# Patient Record
Sex: FEMALE | Race: WHITE | NOT HISPANIC OR LATINO | Employment: OTHER | URBAN - METROPOLITAN AREA
[De-identification: names, ages, dates, MRNs, and addresses within clinical notes are randomized per-mention and may not be internally consistent; named-entity substitution may affect disease eponyms.]

---

## 2017-02-06 ENCOUNTER — OFFICE VISIT (OUTPATIENT)
Dept: ONCOLOGY | Facility: CLINIC | Age: 42
End: 2017-02-06

## 2017-02-06 VITALS
DIASTOLIC BLOOD PRESSURE: 62 MMHG | HEIGHT: 65 IN | WEIGHT: 144.6 LBS | HEART RATE: 98 BPM | TEMPERATURE: 98.1 F | BODY MASS INDEX: 24.09 KG/M2 | RESPIRATION RATE: 16 BRPM | SYSTOLIC BLOOD PRESSURE: 133 MMHG

## 2017-02-06 DIAGNOSIS — C50.412 MALIGNANT NEOPLASM OF UPPER-OUTER QUADRANT OF LEFT FEMALE BREAST (HCC): Primary | ICD-10-CM

## 2017-02-06 PROCEDURE — 99213 OFFICE O/P EST LOW 20 MIN: CPT | Performed by: INTERNAL MEDICINE

## 2017-02-06 NOTE — PROGRESS NOTES
"PROBLEM LIST:  Oncology/Hematology History    1. Stage IIA, ER/CT positive, HER-2 negative left breast cancer measuring  about 2.3 cm in largest dimension. Dx: 8/18/2015   2. Oncotype DX score of 17 placing her at low risk for recurrence.   3. Currently on tamoxifen initiated in 11/2015.  4. Genetic testing was negative with the BreastNext panel.         Malignant neoplasm of upper-outer quadrant of left female breast    8/18/2015 Initial Diagnosis    Malignant neoplasm of upper-outer quadrant of left female breast       REASON FOR VISIT: Stage II breast cancer    HISTORY OF PRESENT ILLNESS:   41-year-old lady with stage II breast cancer presents today for follow-up.  She is currently 18 months out from her diagnosis and clinically doing well.  She is tolerating tamoxifen without any side effects.  She otherwise denies any pain or other issues ongoing.    Past medical history, social history and family history was reviewed and unchanged from prior visit.    Review of Systems:    Review of Systems - Oncology   A comprehensive 14 point review of systems was performed and was negative except as mentioned.      Medications:  The current medication list was reviewed in the EMR    ALLERGIES:    Allergies   Allergen Reactions   • Methylprednisolone    • Nitrofurantoin Hives   • Nsaids    • Percocet [Oxycodone-Acetaminophen] Hives         Physical Exam    VITAL SIGNS:  Visit Vitals   • /62   • Pulse 98   • Temp 98.1 °F (36.7 °C) (Temporal Artery )   • Resp 16   • Ht 65\" (165.1 cm)   • Wt 144 lb 9.6 oz (65.6 kg)   • LMP  (LMP Unknown)   • BMI 24.06 kg/m2        Performance Status: 0    General: well appearing, in no acute distress  HEENT: sclera anicteric, oropharynx clear, neck is supple  Lymphatics: no cervical, supraclavicular, or axillary adenopathy  Cardiovascular: regular rate and rhythm, no murmurs, rubs or gallops  Lungs: clear to auscultation bilaterally  Abdomen: soft, nontender, nondistended.  No palpable " organomegaly  Extremities: no lower extremity edema  Skin: no rashes, lesions, bruising, or petechiae  Msk:  Shows no weakness of the large muscle groups  Psych: Mood is stable        RECENT LABS:    Lab Results   Component Value Date    WBC 6.62 09/28/2016    HGB 13.3 09/28/2016    HCT 40.5 09/28/2016    MCV 96.4 09/28/2016     09/28/2016      Lab Results   Component Value Date    GLUCOSE 75 10/02/2015    BUN 20 10/02/2015    CREATININE 0.7 10/02/2015    CO2 26 10/02/2015    CALCIUM 9.7 10/02/2015    ALBUMIN 4.2 10/02/2015    AST 15 10/02/2015    ALT 11 10/02/2015         Assessment/Plan    41-year-old lady with stage II a ER/WA positive low grade left upper quadrant breast cancer.  Clinically doing well.  Tamoxifen well tolerated.  She has had hysterectomy so she does not need to be followed for endometrial dysplasia.  I can see her in 1 year unless she is having issues in the interim.  Return to clinic in 1 year.              Coleen Bautista MD  [facility] Hemat[facility] On[facility]    2/6/2017         Please note that portions of this note may have been completed with a voice recognition program. Efforts were made to edit the dictations, but occasionally words are mistranscribed.

## 2017-02-20 RX ORDER — TAMOXIFEN CITRATE 20 MG/1
20 TABLET ORAL DAILY
Qty: 90 TABLET | Refills: 4 | Status: SHIPPED | OUTPATIENT
Start: 2017-02-20 | End: 2018-03-03 | Stop reason: SDUPTHER

## 2017-03-29 ENCOUNTER — OFFICE VISIT (OUTPATIENT)
Dept: INTERNAL MEDICINE | Facility: CLINIC | Age: 42
End: 2017-03-29

## 2017-03-29 VITALS
SYSTOLIC BLOOD PRESSURE: 100 MMHG | BODY MASS INDEX: 23.63 KG/M2 | WEIGHT: 142 LBS | RESPIRATION RATE: 20 BRPM | TEMPERATURE: 98 F | HEART RATE: 80 BPM | DIASTOLIC BLOOD PRESSURE: 60 MMHG

## 2017-03-29 DIAGNOSIS — R25.1 TREMOR: ICD-10-CM

## 2017-03-29 DIAGNOSIS — H00.11 CHALAZION OF RIGHT UPPER EYELID: Primary | ICD-10-CM

## 2017-03-29 PROCEDURE — 99213 OFFICE O/P EST LOW 20 MIN: CPT | Performed by: INTERNAL MEDICINE

## 2017-03-29 NOTE — PATIENT INSTRUCTIONS
Chalazion  A chalazion is a swelling or lump on the eyelid. It can affect the upper or lower eyelid.  CAUSES  This condition may be caused by:  · Long-lasting (chronic) inflammation of the eyelid glands.  · A blocked oil gland in the eyelid.  SYMPTOMS  Symptoms of this condition include:  · A swelling on the eyelid. The swelling may spread to areas around the eye.  · A hard lump on the eyelid. This lump may make it hard to see out of the eye.  DIAGNOSIS  This condition is diagnosed with an examination of the eye.  TREATMENT  This condition is treated by applying a warm compress to the eyelid. If the condition does not improve after two days, it may be treated with:  · Surgery.  · Medicine that is injected into the chalazion by a health care provider.  · Medicine that is applied to the eye.  HOME CARE INSTRUCTIONS  · Do not touch the chalazion.  · Do not try to remove the pus, such as by squeezing the chalazion or sticking it with a pin or needle.  · Do not rub your eyes.  · Wash your hands often. Dry your hands with a clean towel.  · Keep your face, scalp, and eyebrows clean.  · Avoid wearing eye makeup.  · Apply a warm, moist compress to the eyelid 4-6 times a day for 10-15 minutes at a time. This will help to open any blocked glands and help to reduce redness and swelling.  · Apply over-the-counter and prescription medicines only as told by your health care provider.  · If the chalazion does not break open (rupture) on its own in a month, return to your health care provider.  · Keep all follow-up appointments as told by your health care provider. This is important.  SEEK MEDICAL CARE IF:  · Your eyelid has not improved in 4 weeks.  · Your eyelid is getting worse.  · You have a fever.  · The chalazion does not rupture on its own with home treatment in a month.  SEEK IMMEDIATE MEDICAL CARE IF:  · You have pain in your eye.  · Your vision changes.  · The chalazion becomes painful or red  · The chalazion gets  bigger.     This information is not intended to replace advice given to you by your health care provider. Make sure you discuss any questions you have with your health care provider.     Document Released: 12/15/2001 Document Revised: 09/07/2016 Document Reviewed: 04/11/2016  [employer]@ Interactive Patient Education ©2016 [employer]

## 2017-03-29 NOTE — PROGRESS NOTES
Subjective       Maryana Love is a 41 y.o. female.     Chief Complaint   Patient presents with   • Stye     Rft eye   follow up  11/1206       History obtained from the patient.      Eye Problem    The right eye is affected. This is a chronic problem. Episode onset: November 2016. The problem occurs constantly. The problem has been unchanged. There was no injury mechanism. The patient is experiencing no pain. Pertinent negatives include no blurred vision, eye discharge, double vision, eye redness, fever, foreign body sensation, itching, nausea, photophobia, recent URI or vomiting. Treatments tried: Augmentin and warm compresses. The treatment provided no relief.        The following portions of the patient's history were reviewed and updated as appropriate: allergies, current medications, past family history, past medical history, past social history, past surgical history and problem list.      Review of Systems   Constitutional: Negative for chills and fever.   HENT: Positive for sinus pressure (x 2-3 days) and sore throat (x 2-3 days). Negative for congestion, ear pain, postnasal drip and rhinorrhea.    Eyes: Negative for blurred vision, double vision, photophobia, pain, discharge, redness and itching.   Respiratory: Negative for cough, shortness of breath and wheezing.    Cardiovascular: Negative for chest pain.   Gastrointestinal: Negative for abdominal pain, diarrhea, nausea and vomiting.   Skin: Negative for itching and rash.   Neurological: Positive for tremors (started in 2015, progressively worse, sometimes cannot write or pick things up.). Negative for headaches.   Hematological: Negative for adenopathy.         Blood pressure 100/60, pulse 80, temperature 98 °F (36.7 °C), temperature source Temporal Artery , resp. rate 20, weight 142 lb (64.4 kg), not currently breastfeeding.      Objective     Physical Exam   Constitutional: She appears well-developed and well-nourished.   HENT:   Head: Normocephalic  and atraumatic.   Right Ear: Tympanic membrane, external ear and ear canal normal.   Left Ear: Tympanic membrane, external ear and ear canal normal.   Mouth/Throat: Oropharynx is clear and moist and mucous membranes are normal. No oral lesions.   Tonsils normal.  No sinus tenderness to palpation.   Eyes: Conjunctivae are normal.   There is a flesh colored, nontender nodule on the right upper eyelid.   Neck: Normal range of motion. Neck supple.   Cardiovascular: Normal rate and regular rhythm.    No murmur heard.  Pulmonary/Chest: Effort normal and breath sounds normal.   Lymphadenopathy:     She has no cervical adenopathy.   Skin: No rash noted.   Psychiatric: She has a normal mood and affect.   Nursing note and vitals reviewed.        Assessment/Plan   Maryana was seen today for stye.    Diagnoses and all orders for this visit:    Chalazion of right upper eyelid  -     Ambulatory Referral to Ophthalmology    Tremor  -     Ambulatory Referral to Neurology    Return if symptoms worsen or fail to improve.

## 2017-04-20 ENCOUNTER — OFFICE VISIT (OUTPATIENT)
Dept: INTERNAL MEDICINE | Facility: CLINIC | Age: 42
End: 2017-04-20

## 2017-04-20 VITALS
BODY MASS INDEX: 23.68 KG/M2 | TEMPERATURE: 97.7 F | RESPIRATION RATE: 20 BRPM | SYSTOLIC BLOOD PRESSURE: 116 MMHG | HEIGHT: 65 IN | DIASTOLIC BLOOD PRESSURE: 72 MMHG | WEIGHT: 142.13 LBS | HEART RATE: 72 BPM | OXYGEN SATURATION: 98 %

## 2017-04-20 DIAGNOSIS — E55.9 VITAMIN D DEFICIENCY: ICD-10-CM

## 2017-04-20 DIAGNOSIS — Z00.00 ENCOUNTER FOR HEALTH MAINTENANCE EXAMINATION IN ADULT: Primary | ICD-10-CM

## 2017-04-20 DIAGNOSIS — Z13.6 SCREENING FOR CARDIOVASCULAR CONDITION: ICD-10-CM

## 2017-04-20 DIAGNOSIS — C50.412 MALIGNANT NEOPLASM OF UPPER-OUTER QUADRANT OF LEFT FEMALE BREAST (HCC): ICD-10-CM

## 2017-04-20 PROCEDURE — 99396 PREV VISIT EST AGE 40-64: CPT | Performed by: INTERNAL MEDICINE

## 2017-04-20 NOTE — PROGRESS NOTES
Subjective   History of Present Illness    History obtained from the patient.    The patient is a history of Breast Cancer followed by Dr. Lopez.  She is on Tamoxifen.      Vitamin D Deficiency: The patient is being seen for follow-up of Vitamin D Deficiency.  Last Vitamin D3 level on 2/21/14 was 34.2,  Current treatment includes a Multivitamin.   Symptoms:  no fatigue, no bone pain, no muscle pain, no muscle weakness, no muscle cramps, no muscle twitching, no paresthesias and no gait abnormality.     Depression and Anxiety (Follow-Up): The patient states her Anxiety and Depression has been stable since the last visit.   Interval Events: The patient was diagnosed with Bipolar Disorder in 2014.  She sees Dr. Coy.  She has been stable on Wellbutrin and Lamictal.  Interval Symptoms: Stable depression and anxiety.  Denies loss of interest or pleasure in activities, denies insomnia, denies excessive sleepiness, denies inability to perform normal activities, denies loss of energy, denies feelings of worthlessness, denies feelings of guilt, and denies trouble concentrating.   The patient denies thoughts of suicide.   Social Support: the patient has good social support.   Medications:  Wellbutrin and Lamictal. The patient is adherent with her medication regimen. She denies medication side effects.     Maryana Love is a 41 y.o. female who presents for an Annual Physical.      PMH, PSH, SocHx, FamHx, Allergies, and Medications: Reviewed and updated.    Outpatient Medications Prior to Visit   Medication Sig Dispense Refill   • buPROPion XL (WELLBUTRIN XL) 150 MG 24 hr tablet Take 450 mg by mouth every morning.     • lamoTRIgine (LAMICTAL) 150 MG tablet Take 300 mg by mouth 2 (two) times a day.     • Multiple Vitamin (MULTI VITAMIN DAILY PO) Take 1 tablet by mouth daily.     • tamoxifen (NOLVADEX) 20 MG chemo tablet Take 1 tablet by mouth Daily. 90 tablet 4     No facility-administered medications prior to visit.         Immunization History   Administered Date(s) Administered   • Tdap 02/04/2014         Patient Active Problem List   Diagnosis   • Allergic rhinitis   • Anxiety disorder   • Malignant neoplasm of upper-outer quadrant of left female breast   • Chronic urinary tract infection   • Depression   • Hearing loss   • Edema   • Hemangioma of liver   • Cyst of ovary   • Vitamin D deficiency       Health Habits:  Dental Exam. up to date  Eye Exam. up to date  Hearing Loss:  Yes, declines evaluation  Exercise: 7 times/week.  Current exercise activities include: walking and home exercises  Diet: Healthy  Multivitamin: yes    Safe Driving:  Yes  Seat Belt:  Yes  Bike Helmet:  N/A  Skin Screening:  Yes  Sunscreen: Yes  SBE / VANESSA: N/A  Sexual Activity:  Yes  Birth Control:  N/A  STD Prevention:  N/A    Last Pap: 2008, normal (STACI/BSO)  Last Mammogram:  8/18/18, cat 5 (bilateral mastectomy)  Last DEXA Scan: N/A  Last Colonoscopy: N/A  Last PSA: N/A    Social:    Social History     Social History   • Marital status:      Spouse name: N/A   • Number of children: 3   • Years of education: N/A     Occupational History   • 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, full time     Social History Main Topics   • Smoking status: Former Smoker     Packs/day: 0.25     Years: 22.00     Types: Cigarettes     Quit date: 6/22/2013   • Smokeless tobacco: Never Used      Comment: DOES VAPE OCCASIONALLY   • Alcohol use Yes      Comment: SOCIAL DRINKER, liquor 3-4 times per year   • Drug use: No   • Sexual activity: Not on file     Other Topics Concern   • Not on file     Social History Narrative         Current Medical Providers:    Annabelle Camargo MD (Internal Medicine / Pediatrics)    The [facility] providers who are involved in the care of this patient are listed above.         Review of Systems   Constitutional: Negative for chills, fatigue, fever and unexpected weight change.        No weight gain or weight loss.  No night  sweats.  No generalized pain.   HENT: Negative for congestion, ear pain, hearing loss, nosebleeds, postnasal drip, rhinorrhea, sinus pressure, sneezing, sore throat, tinnitus and voice change.         Denies snoring.   Eyes: Negative for photophobia, pain, discharge, redness, itching and visual disturbance.        Has a right eye stye.     Respiratory: Negative for cough, chest tightness, shortness of breath, wheezing and stridor.         No orthopnea, dyspnea on exertion, or PND.  No chest congestion.   No  hemoptysis.   Cardiovascular: Negative for chest pain, palpitations and leg swelling.        No claudication or syncope.   Gastrointestinal: Negative for abdominal pain, blood in stool, constipation, diarrhea, nausea, rectal pain and vomiting.        No hematemesis.  No heartburn, dysphagia or odynophagia.  No belching or bloating.  No melena.   Endocrine: Positive for polydipsia (not new). Negative for cold intolerance, heat intolerance, polyphagia and polyuria.        No hair loss or dry skin.  No generalized weakness.  No hot flashes.   Genitourinary: Negative for difficulty urinating, dyspareunia, dysuria, flank pain, frequency, hematuria, menstrual problem, pelvic pain, urgency, vaginal bleeding and vaginal discharge.        No nocturia, incomplete emptying, or incontinence.   Musculoskeletal: Negative for arthralgias, back pain, gait problem, joint swelling, myalgias, neck pain and neck stiffness.        No joint stiffness. Has stable right hip pain.   Skin: Negative for rash.        No new skin lesions or changes in skin lesions. No breast pain or masses.  No nipple discharge or nipple inversion.   Neurological: Negative for dizziness, tremors, syncope, speech difficulty, weakness, light-headedness, numbness and headaches.        No tingling.  No memory loss.  No decreased concentration.   Hematological: Negative for adenopathy. Does not bruise/bleed easily.   Psychiatric/Behavioral: Negative for  "confusion, sleep disturbance and suicidal ideas. The patient is not nervous/anxious.         No depression.           Objective     Vitals:    04/20/17 1209   BP: 116/72   BP Location: Right arm   Patient Position: Sitting   Pulse: 72   Resp: 20   Temp: 97.7 °F (36.5 °C)   TempSrc: Temporal Artery    SpO2: 98%   Weight: 142 lb 2 oz (64.5 kg)   Height: 64.5\" (163.8 cm)       Body mass index is 24.02 kg/(m^2).    Physical Exam   Constitutional: She appears well-developed and well-nourished.   HENT:   Head: Normocephalic and atraumatic.   Right Ear: Tympanic membrane, external ear and ear canal normal.   Left Ear: Tympanic membrane, external ear and ear canal normal.   Mouth/Throat: Oropharynx is clear and moist. No oropharyngeal exudate.   Stye right upper eyelid.     Eyes: Conjunctivae and EOM are normal. Pupils are equal, round, and reactive to light.   Neck: Normal range of motion. Neck supple. No thyromegaly present.   Cardiovascular: Normal rate, regular rhythm and intact distal pulses.  Exam reveals no gallop and no friction rub.    No murmur heard.  Pulmonary/Chest: Effort normal and breath sounds normal.   Abdominal: Soft. Bowel sounds are normal. She exhibits no distension and no mass. There is no tenderness. There is no rebound and no guarding.   Rectal exam deferred.   Genitourinary:   Genitourinary Comments:  deferred (STACI / BSO).   Musculoskeletal: Normal range of motion. She exhibits no edema or tenderness.   Lymphadenopathy:     She has no cervical adenopathy.        Right cervical: No posterior cervical adenopathy present.       Left cervical: No posterior cervical adenopathy present.     She has no axillary adenopathy.        Right: No inguinal and no supraclavicular adenopathy present.        Left: No inguinal and no supraclavicular adenopathy present.   Neurological: She is alert. She has normal strength and normal reflexes. She displays normal reflexes. No cranial nerve deficit. She exhibits " normal muscle tone. Coordination and gait normal.   Skin: No lesion and no rash noted.   No atypical skin lesions.  Breast exam deferred (bilateral mastectomy)   Psychiatric: She has a normal mood and affect.   Nursing note and vitals reviewed.          Assessment/Plan      Maryana was seen today for annual exam.    Diagnoses and all orders for this visit:    Encounter for health maintenance examination in adult    Screening for cardiovascular condition  -     TSH; Future  -     Vitamin D 25 Hydroxy; Future  -     CBC & Differential; Future  -     Lipid Panel; Future  -     Comprehensive Metabolic Panel; Future    Vitamin D deficiency  -     Vitamin D 25 Hydroxy; Future    Malignant neoplasm of upper-outer quadrant of left female breast    The patient agrees to return for fasting labs.    Return in about 1 year (around 4/20/2018) for Annual physical, fasting.

## 2017-04-20 NOTE — PATIENT INSTRUCTIONS
The patient agrees to return for fasting labs.    Health Maintenance, Female  Adopting a healthy lifestyle and getting preventive care can go a long way to promote health and wellness. Talk with your health care provider about what schedule of regular examinations is right for you. This is a good chance for you to check in with your provider about disease prevention and staying healthy.  In between checkups, there are plenty of things you can do on your own. Experts have done a lot of research about which lifestyle changes and preventive measures are most likely to keep you healthy. Ask your health care provider for more information.  WEIGHT AND DIET   Eat a healthy diet  · Be sure to include plenty of vegetables, fruits, low-fat dairy products, and lean protein.  · Do not eat a lot of foods high in solid fats, added sugars, or salt.  · Get regular exercise. This is one of the most important things you can do for your health.  ¨ Most adults should exercise for at least 150 minutes each week. The exercise should increase your heart rate and make you sweat (moderate-intensity exercise).  ¨ Most adults should also do strengthening exercises at least twice a week. This is in addition to the moderate-intensity exercise.    Maintain a healthy weight  · Body mass index (BMI) is a measurement that can be used to identify possible weight problems. It estimates body fat based on height and weight. Your health care provider can help determine your BMI and help you achieve or maintain a healthy weight.  · For females 20 years of age and older:      A BMI below 18.5 is considered underweight.    A BMI of 18.5 to 24.9 is normal.    A BMI of 25 to 29.9 is considered overweight.    A BMI of 30 and above is considered obese.    Watch levels of cholesterol and blood lipids  · You should start having your blood tested for lipids and cholesterol at 20 years of age, then have this test every 5 years.  · You may need to have your  cholesterol levels checked more often if:  ¨ Your lipid or cholesterol levels are high.  ¨ You are older than 50 years of age.  ¨ You are at high risk for heart disease.    CANCER SCREENING      Lung Cancer  · Lung cancer screening is recommended for adults 55-80 years old who are at high risk for lung cancer because of a history of smoking.  · A yearly low-dose CT scan of the lungs is recommended for people who:  ¨ Currently smoke.  ¨ Have quit within the past 15 years.  ¨ Have at least a 30-pack-year history of smoking. A pack year is smoking an average of one pack of cigarettes a day for 1 year.  · Yearly screening should continue until it has been 15 years since you quit.  · Yearly screening should stop if you develop a health problem that would prevent you from having lung cancer treatment.    Breast Cancer  · Practice breast self-awareness. This means understanding how your breasts normally appear and feel.  · It also means doing regular breast self-exams. Let your health care provider know about any changes, no matter how small.  · If you are in your 20s or 30s, you should have a clinical breast exam (CBE) by a health care provider every 1-3 years as part of a regular health exam.  · If you are 40 or older, have a CBE every year. Also consider having a breast X-ray (mammogram) every year.  · If you have a family history of breast cancer, talk to your health care provider about genetic screening.  · If you are at high risk for breast cancer, talk to your health care provider about having an MRI and a mammogram every year.  · Breast cancer gene (BRCA) assessment is recommended for women who have family members with BRCA-related cancers. BRCA-related cancers include:  ¨ Breast.  ¨ Ovarian.  ¨ Tubal.  ¨ Peritoneal cancers.  · Results of the assessment will determine the need for genetic counseling and BRCA1 and BRCA2 testing.  Cervical Cancer  Your health care provider may recommend that you be screened regularly  for cancer of the pelvic organs (ovaries, uterus, and vagina). This screening involves a pelvic examination, including checking for microscopic changes to the surface of your cervix (Pap test). You may be encouraged to have this screening done every 3 years, beginning at age 21.  · For women ages 30-65, health care providers may recommend pelvic exams and Pap testing every 3 years, or they may recommend the Pap and pelvic exam, combined with testing for human papilloma virus (HPV), every 5 years. Some types of HPV increase your risk of cervical cancer. Testing for HPV may also be done on women of any age with unclear Pap test results.  · Other health care providers may not recommend any screening for nonpregnant women who are considered low risk for pelvic cancer and who do not have symptoms. Ask your health care provider if a screening pelvic exam is right for you.  · If you have had past treatment for cervical cancer or a condition that could lead to cancer, you need Pap tests and screening for cancer for at least 20 years after your treatment. If Pap tests have been discontinued, your risk factors (such as having a new sexual partner) need to be reassessed to determine if screening should resume. Some women have medical problems that increase the chance of getting cervical cancer. In these cases, your health care provider may recommend more frequent screening and Pap tests.  Colorectal Cancer  · This type of cancer can be detected and often prevented.  · Routine colorectal cancer screening usually begins at 50 years of age and continues through 75 years of age.  · Your health care provider may recommend screening at an earlier age if you have risk factors for colon cancer.  · Your health care provider may also recommend using home test kits to check for hidden blood in the stool.  · A small camera at the end of a tube can be used to examine your colon directly (sigmoidoscopy or colonoscopy). This is done to check  for the earliest forms of colorectal cancer.  · Routine screening usually begins at age 50.  · Direct examination of the colon should be repeated every 5-10 years through 75 years of age. However, you may need to be screened more often if early forms of precancerous polyps or small growths are found.  Skin Cancer  · Check your skin from head to toe regularly.  · Tell your health care provider about any new moles or changes in moles, especially if there is a change in a mole's shape or color.  · Also tell your health care provider if you have a mole that is larger than the size of a pencil eraser.  · Always use sunscreen. Apply sunscreen liberally and repeatedly throughout the day.  · Protect yourself by wearing long sleeves, pants, a wide-brimmed hat, and sunglasses whenever you are outside.  HEART DISEASE, DIABETES, AND HIGH BLOOD PRESSURE   · High blood pressure causes heart disease and increases the risk of stroke. High blood pressure is more likely to develop in:    People who have blood pressure in the high end of the normal range (130-139/85-89 mm Hg).    People who are overweight or obese.    People who are .  · If you are 18-39 years of age, have your blood pressure checked every 3-5 years. If you are 40 years of age or older, have your blood pressure checked every year. You should have your blood pressure measured twice--once when you are at a hospital or clinic, and once when you are not at a hospital or clinic. Record the average of the two measurements. To check your blood pressure when you are not at a hospital or clinic, you can use:    An automated blood pressure machine at a pharmacy.    A home blood pressure monitor.  · If you are between 55 years and 79 years old, ask your health care provider if you should take aspirin to prevent strokes.  · Have regular diabetes screenings. This involves taking a blood sample to check your fasting blood sugar level.    If you are at a normal  weight and have a low risk for diabetes, have this test once every three years after 45 years of age.    If you are overweight and have a high risk for diabetes, consider being tested at a younger age or more often.  PREVENTING INFECTION   Hepatitis B  · If you have a higher risk for hepatitis B, you should be screened for this virus. You are considered at high risk for hepatitis B if:    You were born in a country where hepatitis B is common. Ask your health care provider which countries are considered high risk.    Your parents were born in a high-risk country, and you have not been immunized against hepatitis B (hepatitis B vaccine).    You have HIV or AIDS.    You use needles to inject street drugs.    You live with someone who has hepatitis B.    You have had sex with someone who has hepatitis B.    You get hemodialysis treatment.    You take certain medicines for conditions, including cancer, organ transplantation, and autoimmune conditions.  Hepatitis C  · Blood testing is recommended for:  ¨ Everyone born from 1945 through 1965.  ¨ Anyone with known risk factors for hepatitis C.  Sexually transmitted infections (STIs)  · You should be screened for sexually transmitted infections (STIs) including gonorrhea and chlamydia if:  ¨ You are sexually active and are younger than 24 years of age.  ¨ You are older than 24 years of age and your health care provider tells you that you are at risk for this type of infection.  ¨ Your sexual activity has changed since you were last screened and you are at an increased risk for chlamydia or gonorrhea. Ask your health care provider if you are at risk.    If you do not have HIV, but are at risk, it may be recommended that you take a prescription medicine daily to prevent HIV infection. This is called pre-exposure prophylaxis (PrEP). You are considered at risk if:    You are sexually active and do not regularly use condoms or know the HIV status of your partner(s).    You take  drugs by injection.    You are sexually active with a partner who has HIV.  Talk with your health care provider about whether you are at high risk of being infected with HIV. If you choose to begin PrEP, you should first be tested for HIV. You should then be tested every 3 months for as long as you are taking PrEP.   PREGNANCY   · If you are premenopausal and you may become pregnant, ask your health care provider about preconception counseling.  · If you may become pregnant, take 400 to 800 micrograms (mcg) of folic acid every day.  · If you want to prevent pregnancy, talk to your health care provider about birth control (contraception).  OSTEOPOROSIS AND MENOPAUSE   · Osteoporosis is a disease in which the bones lose minerals and strength with aging. This can result in serious bone fractures. Your risk for osteoporosis can be identified using a bone density scan.  · If you are 65 years of age or older, or if you are at risk for osteoporosis and fractures, ask your health care provider if you should be screened.  · Ask your health care provider whether you should take a calcium or vitamin D supplement to lower your risk for osteoporosis.  · Menopause may have certain physical symptoms and risks.  · Hormone replacement therapy may reduce some of these symptoms and risks.  Talk to your health care provider about whether hormone replacement therapy is right for you.   HOME CARE INSTRUCTIONS   · Schedule regular health, dental, and eye exams.  · Stay current with your immunizations.    · Do not use any tobacco products including cigarettes, chewing tobacco, or electronic cigarettes.  · If you are pregnant, do not drink alcohol.  · If you are breastfeeding, limit how much and how often you drink alcohol.  · Limit alcohol intake to no more than 1 drink per day for nonpregnant women. One drink equals 12 ounces of beer, 5 ounces of wine, or 1½ ounces of hard liquor.  · Do not use street drugs.  · Do not share  needles.  · Ask your health care provider for help if you need support or information about quitting drugs.  · Tell your health care provider if you often feel depressed.  · Tell your health care provider if you have ever been abused or do not feel safe at home.     This information is not intended to replace advice given to you by your health care provider. Make sure you discuss any questions you have with your health care provider.     Document Released: 07/02/2012 Document Revised: 01/08/2016 Document Reviewed: 11/19/2014  [employer] Interactive Patient Education ©2016 [employer]

## 2017-04-23 PROBLEM — F31.9 BIPOLAR AFFECTIVE DISORDER (HCC): Status: ACTIVE | Noted: 2017-04-23

## 2017-05-08 ENCOUNTER — OFFICE VISIT (OUTPATIENT)
Dept: NEUROLOGY | Facility: CLINIC | Age: 42
End: 2017-05-08

## 2017-05-08 VITALS
SYSTOLIC BLOOD PRESSURE: 116 MMHG | WEIGHT: 142 LBS | OXYGEN SATURATION: 98 % | BODY MASS INDEX: 23.66 KG/M2 | HEIGHT: 65 IN | DIASTOLIC BLOOD PRESSURE: 68 MMHG | HEART RATE: 90 BPM

## 2017-05-08 DIAGNOSIS — R25.1 TREMOR: Primary | ICD-10-CM

## 2017-05-08 PROCEDURE — 99244 OFF/OP CNSLTJ NEW/EST MOD 40: CPT | Performed by: PSYCHIATRY & NEUROLOGY

## 2017-05-11 ENCOUNTER — LAB (OUTPATIENT)
Dept: INTERNAL MEDICINE | Facility: CLINIC | Age: 42
End: 2017-05-11

## 2017-05-11 DIAGNOSIS — E55.9 VITAMIN D DEFICIENCY: ICD-10-CM

## 2017-05-11 DIAGNOSIS — Z13.6 SCREENING FOR CARDIOVASCULAR CONDITION: ICD-10-CM

## 2017-05-11 LAB
25(OH)D3 SERPL-MCNC: 32.8 NG/ML
ALBUMIN SERPL-MCNC: 4 G/DL (ref 3.2–4.8)
ALBUMIN/GLOB SERPL: 1.5 G/DL (ref 1.5–2.5)
ALP SERPL-CCNC: 56 U/L (ref 25–100)
ALT SERPL W P-5'-P-CCNC: 16 U/L (ref 7–40)
ANION GAP SERPL CALCULATED.3IONS-SCNC: 3 MMOL/L (ref 3–11)
ARTICHOKE IGE QN: 75 MG/DL (ref 0–130)
AST SERPL-CCNC: 23 U/L (ref 0–33)
BASOPHILS # BLD AUTO: 0.06 10*3/MM3 (ref 0–0.2)
BASOPHILS NFR BLD AUTO: 1.1 % (ref 0–1)
BILIRUB SERPL-MCNC: 0.4 MG/DL (ref 0.3–1.2)
BUN BLD-MCNC: 18 MG/DL (ref 9–23)
BUN/CREAT SERPL: 20 (ref 7–25)
CALCIUM SPEC-SCNC: 9.6 MG/DL (ref 8.7–10.4)
CHLORIDE SERPL-SCNC: 108 MMOL/L (ref 99–109)
CHOLEST SERPL-MCNC: 146 MG/DL (ref 0–200)
CO2 SERPL-SCNC: 32 MMOL/L (ref 20–31)
CREAT BLD-MCNC: 0.9 MG/DL (ref 0.6–1.3)
DEPRECATED RDW RBC AUTO: 43.8 FL (ref 37–54)
EOSINOPHIL # BLD AUTO: 0.24 10*3/MM3 (ref 0.1–0.3)
EOSINOPHIL NFR BLD AUTO: 4.5 % (ref 0–3)
ERYTHROCYTE [DISTWIDTH] IN BLOOD BY AUTOMATED COUNT: 12 % (ref 11.3–14.5)
GFR SERPL CREATININE-BSD FRML MDRD: 69 ML/MIN/1.73
GLOBULIN UR ELPH-MCNC: 2.6 GM/DL
GLUCOSE BLD-MCNC: 85 MG/DL (ref 70–100)
HCT VFR BLD AUTO: 40.4 % (ref 34.5–44)
HDLC SERPL-MCNC: 53 MG/DL (ref 40–60)
HGB BLD-MCNC: 12.8 G/DL (ref 11.5–15.5)
IMM GRANULOCYTES # BLD: 0 10*3/MM3 (ref 0–0.03)
IMM GRANULOCYTES NFR BLD: 0 % (ref 0–0.6)
LYMPHOCYTES # BLD AUTO: 2.68 10*3/MM3 (ref 0.6–4.8)
LYMPHOCYTES NFR BLD AUTO: 49.9 % (ref 24–44)
MCH RBC QN AUTO: 31.4 PG (ref 27–31)
MCHC RBC AUTO-ENTMCNC: 31.7 G/DL (ref 32–36)
MCV RBC AUTO: 99.3 FL (ref 80–99)
MONOCYTES # BLD AUTO: 0.36 10*3/MM3 (ref 0–1)
MONOCYTES NFR BLD AUTO: 6.7 % (ref 0–12)
NEUTROPHILS # BLD AUTO: 2.03 10*3/MM3 (ref 1.5–8.3)
NEUTROPHILS NFR BLD AUTO: 37.8 % (ref 41–71)
PLATELET # BLD AUTO: 262 10*3/MM3 (ref 150–450)
PMV BLD AUTO: 10.6 FL (ref 6–12)
POTASSIUM BLD-SCNC: 4.1 MMOL/L (ref 3.5–5.5)
PROT SERPL-MCNC: 6.6 G/DL (ref 5.7–8.2)
RBC # BLD AUTO: 4.07 10*6/MM3 (ref 3.89–5.14)
SODIUM BLD-SCNC: 143 MMOL/L (ref 132–146)
TRIGL SERPL-MCNC: 73 MG/DL (ref 0–150)
TSH SERPL DL<=0.05 MIU/L-ACNC: 0.9 MIU/ML (ref 0.35–5.35)
WBC NRBC COR # BLD: 5.37 10*3/MM3 (ref 3.5–10.8)

## 2017-05-11 PROCEDURE — 80061 LIPID PANEL: CPT | Performed by: INTERNAL MEDICINE

## 2017-05-11 PROCEDURE — 85025 COMPLETE CBC W/AUTO DIFF WBC: CPT | Performed by: INTERNAL MEDICINE

## 2017-05-11 PROCEDURE — 84443 ASSAY THYROID STIM HORMONE: CPT | Performed by: INTERNAL MEDICINE

## 2017-05-11 PROCEDURE — 80053 COMPREHEN METABOLIC PANEL: CPT | Performed by: INTERNAL MEDICINE

## 2017-05-11 PROCEDURE — 36415 COLL VENOUS BLD VENIPUNCTURE: CPT | Performed by: INTERNAL MEDICINE

## 2017-05-11 PROCEDURE — 82306 VITAMIN D 25 HYDROXY: CPT | Performed by: INTERNAL MEDICINE

## 2017-06-29 ENCOUNTER — TRANSCRIBE ORDERS (OUTPATIENT)
Dept: ONCOLOGY | Facility: CLINIC | Age: 42
End: 2017-06-29

## 2017-06-29 ENCOUNTER — OFFICE VISIT (OUTPATIENT)
Dept: ONCOLOGY | Facility: CLINIC | Age: 42
End: 2017-06-29

## 2017-06-29 VITALS
SYSTOLIC BLOOD PRESSURE: 112 MMHG | BODY MASS INDEX: 23.66 KG/M2 | RESPIRATION RATE: 16 BRPM | HEIGHT: 65 IN | TEMPERATURE: 97.8 F | WEIGHT: 142 LBS | HEART RATE: 100 BPM | DIASTOLIC BLOOD PRESSURE: 55 MMHG

## 2017-06-29 DIAGNOSIS — Z85.3 HISTORY OF LEFT BREAST CANCER: Primary | ICD-10-CM

## 2017-06-29 DIAGNOSIS — C50.412 MALIGNANT NEOPLASM OF UPPER-OUTER QUADRANT OF LEFT FEMALE BREAST (HCC): Primary | ICD-10-CM

## 2017-06-29 PROCEDURE — 99213 OFFICE O/P EST LOW 20 MIN: CPT | Performed by: INTERNAL MEDICINE

## 2017-06-29 NOTE — PROGRESS NOTES
PROBLEM LIST:  Oncology/Hematology History    1. Stage IIA, ER/WI positive, HER-2 negative left breast cancer measuring  about 2.3 cm in largest dimension. Dx: 8/18/2015   2. Oncotype DX score of 17 placing her at low risk for recurrence.   3. Currently on tamoxifen initiated in 11/2015.  4. Genetic testing was negative with the BreastNext panel.         Malignant neoplasm of upper-outer quadrant of left female breast    8/18/2015 Initial Diagnosis    Malignant neoplasm of upper-outer quadrant of left female breast       REASON FOR VISIT: Stage II breast cancer with axillary adenopathy    HISTORY OF PRESENT ILLNESS:   41-year-old lady with stage II breast cancer presents today for follow-up.  She is currently 22 months out from her diagnosis and clinically doing well.  She is tolerating tamoxifen without any side effects.  She presents today with complaint of mass under her left arm.  She reported she had a itchy breast for a few days and then she had a lump that showed up in her left axilla.  She was on doxycycline which seemed to improve her lump.  She is otherwise clinically doing reasonably well.    Past medical history, social history and family history was reviewed and unchanged from prior visit.    Review of Systems:    Review of Systems   Constitutional: Negative.    HENT:  Negative.    Eyes: Negative.    Respiratory: Negative.    Cardiovascular: Negative.    Gastrointestinal: Negative.    Endocrine: Negative.    Genitourinary: Negative.     Musculoskeletal: Negative.    Skin: Negative.    Neurological: Negative.    Hematological: Positive for adenopathy.   Psychiatric/Behavioral: Negative.       A comprehensive 14 point review of systems was performed and was negative except as mentioned.      Medications:  The current medication list was reviewed in the EMR    ALLERGIES:    Allergies   Allergen Reactions   • Methylprednisolone    • Nitrofurantoin Hives   • Nsaids    • Percocet [Oxycodone-Acetaminophen]  "Hives         Physical Exam    VITAL SIGNS:  /55 Comment: RUE  Pulse 100  Temp 97.8 °F (36.6 °C) (Temporal Artery )   Resp 16  Ht 64.5\" (163.8 cm)  Wt 142 lb (64.4 kg)  LMP  (LMP Unknown)  BMI 24 kg/m2     Performance Status: 0    General: well appearing, in no acute distress  HEENT: sclera anicteric, oropharynx clear, neck is supple  Lymphatics:subtle small lymph node on the left  Cardiovascular: regular rate and rhythm, no murmurs, rubs or gallops  Lungs: clear to auscultation bilaterally  Abdomen: soft, nontender, nondistended.  No palpable organomegaly  Extremities: no lower extremity edema  Skin: no rashes, lesions, bruising, or petechiae  Msk:  Shows no weakness of the large muscle groups  Psych: Mood is stable        RECENT LABS:    Lab Results   Component Value Date    WBC 5.37 05/11/2017    HGB 12.8 05/11/2017    HCT 40.4 05/11/2017    MCV 99.3 (H) 05/11/2017     05/11/2017      Lab Results   Component Value Date    GLUCOSE 85 05/11/2017    BUN 18 05/11/2017    CREATININE 0.90 05/11/2017    EGFRIFNONA 69 05/11/2017    BCR 20.0 05/11/2017    CO2 32.0 (H) 05/11/2017    CALCIUM 9.6 05/11/2017    ALBUMIN 4.00 05/11/2017    LABIL2 1.5 05/11/2017    AST 23 05/11/2017    ALT 16 05/11/2017         Assessment/Plan    41-year-old lady with stage II a ER/TN positive low grade left upper quadrant breast cancer. The lymph node in her armpit is likely reactive.  This appears to be much smaller on exam today.  I like to get an ultrasound just to give her peace of mind.  If there is something concerning I will see her sooner.  If not return to clinic in 6 months.  Continue tamoxifen.      Coleen Bautista MD  [facility]ematology [facility] On[facility]    6/29/2017         Please note that portions of this note may have been completed with a voice recognition program. Efforts were made to edit the dictations, but occasionally words are mistranscribed.         "

## 2017-06-30 ENCOUNTER — HOSPITAL ENCOUNTER (OUTPATIENT)
Dept: MAMMOGRAPHY | Facility: HOSPITAL | Age: 42
Discharge: HOME | End: 2017-06-30

## 2017-06-30 ENCOUNTER — HOSPITAL ENCOUNTER (OUTPATIENT)
Dept: ULTRASOUND IMAGING | Facility: HOSPITAL | Age: 42
Discharge: HOME | End: 2017-06-30
Attending: INTERNAL MEDICINE | Admitting: INTERNAL MEDICINE

## 2017-06-30 DIAGNOSIS — C50.412 MALIGNANT NEOPLASM OF UPPER-OUTER QUADRANT OF LEFT FEMALE BREAST (HCC): ICD-10-CM

## 2017-06-30 PROCEDURE — 77066 DX MAMMO INCL CAD BI: CPT | Performed by: RADIOLOGY

## 2017-06-30 PROCEDURE — 76642 ULTRASOUND BREAST LIMITED: CPT | Performed by: RADIOLOGY

## 2017-06-30 PROCEDURE — G0279 TOMOSYNTHESIS, MAMMO: HCPCS

## 2017-06-30 PROCEDURE — 77062 BREAST TOMOSYNTHESIS BI: CPT | Performed by: RADIOLOGY

## 2017-06-30 PROCEDURE — 76642 ULTRASOUND BREAST LIMITED: CPT

## 2017-06-30 PROCEDURE — G0204 DX MAMMO INCL CAD BI: HCPCS

## 2017-07-24 ENCOUNTER — OFFICE VISIT (OUTPATIENT)
Dept: INTERNAL MEDICINE | Facility: CLINIC | Age: 42
End: 2017-07-24

## 2017-07-24 VITALS
WEIGHT: 142 LBS | RESPIRATION RATE: 18 BRPM | HEART RATE: 92 BPM | SYSTOLIC BLOOD PRESSURE: 118 MMHG | BODY MASS INDEX: 24 KG/M2 | TEMPERATURE: 99.6 F | DIASTOLIC BLOOD PRESSURE: 70 MMHG

## 2017-07-24 DIAGNOSIS — N39.0 URINARY TRACT INFECTION, SITE NOT SPECIFIED: ICD-10-CM

## 2017-07-24 DIAGNOSIS — R30.0 DYSURIA: Primary | ICD-10-CM

## 2017-07-24 LAB
BILIRUB BLD-MCNC: ABNORMAL MG/DL
CLARITY, POC: ABNORMAL
COLOR UR: YELLOW
EXPIRATION DATE: ABNORMAL
GLUCOSE UR STRIP-MCNC: NEGATIVE MG/DL
KETONES UR QL: NEGATIVE
LEUKOCYTE EST, POC: ABNORMAL
Lab: ABNORMAL
NITRITE UR-MCNC: POSITIVE MG/ML
PH UR: 6 [PH] (ref 5–8)
PROT UR STRIP-MCNC: NEGATIVE MG/DL
RBC # UR STRIP: ABNORMAL /UL
SP GR UR: 1.02 (ref 1–1.03)
UROBILINOGEN UR QL: NORMAL

## 2017-07-24 PROCEDURE — 87186 SC STD MICRODIL/AGAR DIL: CPT | Performed by: INTERNAL MEDICINE

## 2017-07-24 PROCEDURE — 99214 OFFICE O/P EST MOD 30 MIN: CPT | Performed by: INTERNAL MEDICINE

## 2017-07-24 PROCEDURE — 87086 URINE CULTURE/COLONY COUNT: CPT | Performed by: INTERNAL MEDICINE

## 2017-07-24 PROCEDURE — 87077 CULTURE AEROBIC IDENTIFY: CPT | Performed by: INTERNAL MEDICINE

## 2017-07-24 PROCEDURE — 81002 URINALYSIS NONAUTO W/O SCOPE: CPT | Performed by: INTERNAL MEDICINE

## 2017-07-24 RX ORDER — SULFAMETHOXAZOLE AND TRIMETHOPRIM 800; 160 MG/1; MG/1
1 TABLET ORAL 2 TIMES DAILY
Qty: 20 TABLET | Refills: 0 | Status: SHIPPED | OUTPATIENT
Start: 2017-07-24 | End: 2017-08-03

## 2017-07-24 NOTE — PROGRESS NOTES
Subjective       Maryana Love is a 41 y.o. female.     Chief Complaint   Patient presents with   • Difficulty Urinating       History obtained from the patient.      Urinary Tract Infection    This is a new problem. Episode onset: 3-4 days ago. The problem occurs every urination. The problem has been gradually worsening. The quality of the pain is described as burning. The pain is mild. Maximum temperature: low grade  She is sexually active. There is no history of pyelonephritis. Associated symptoms include nausea and urgency. Pertinent negatives include no chills, discharge, flank pain, frequency, hematuria, possible pregnancy or vomiting. Treatments tried: Pyridium. Her past medical history is significant for recurrent UTIs. There is no history of kidney stones.        The following portions of the patient's history were reviewed and updated as appropriate: allergies, current medications, past family history, past medical history, past social history, past surgical history and problem list.      Review of Systems   Constitutional: Positive for fever. Negative for chills.   Respiratory: Negative for cough, shortness of breath and wheezing.    Cardiovascular: Negative for chest pain.   Gastrointestinal: Positive for nausea. Negative for vomiting.   Genitourinary: Positive for dysuria and urgency. Negative for flank pain, frequency, hematuria, pelvic pain, vaginal bleeding and vaginal discharge.   Skin: Negative for rash.   Hematological: Negative for adenopathy.         Blood pressure 118/70, pulse 92, temperature 99.6 °F (37.6 °C), temperature source Temporal Artery , resp. rate 18, weight 142 lb (64.4 kg), not currently breastfeeding.      Objective     Physical Exam   Constitutional: She appears well-developed and well-nourished.   Cardiovascular: Normal rate, regular rhythm and normal heart sounds.    No murmur heard.  Pulmonary/Chest: Effort normal and breath sounds normal.   Abdominal: Soft. Bowel sounds are  normal. She exhibits no distension and no mass. There is no hepatosplenomegaly. There is no tenderness. There is no CVA tenderness.   Neurological: She is alert.   Skin: No rash noted.   Psychiatric: She has a normal mood and affect.   Nursing note and vitals reviewed.      Results for orders placed or performed in visit on 07/24/17   POC Urinalysis Dipstick   Result Value Ref Range    Color Yellow Yellow, Straw, Dark Yellow, Briana    Clarity, UA Cloudy (A) Clear    Glucose, UA Negative Negative, 1000 mg/dL (3+) mg/dL    Bilirubin 1 mg/dL (A) Negative    Ketones, UA Negative Negative    Specific Gravity  1.020 1.005 - 1.030    Blood, UA 50 Samson/ul (A) Negative    pH, Urine 6.0 5.0 - 8.0    Protein, POC Negative Negative mg/dL    Urobilinogen, UA Normal Normal    Leukocytes 500 Gulshan/ul (A) Negative    Nitrite, UA Positive (A) Negative    Lot Number [id number]     Expiration Date 4-30-18        Conrad/Elizabeth   Maryana was seen today for difficulty urinating.    Diagnoses and all orders for this visit:    Dysuria  -     POC Urinalysis Dipstick    Urinary tract infection, site not specified  -     Urine Culture  -     sulfamethoxazole-trimethoprim (BACTRIM DS) 800-160 MG per tablet; Take 1 tablet by mouth 2 (Two) Times a Day for 10 days.      Return if symptoms worsen or fail to improve.

## 2017-07-26 LAB — BACTERIA SPEC AEROBE CULT: ABNORMAL

## 2017-09-07 ENCOUNTER — OFFICE VISIT (OUTPATIENT)
Dept: INTERNAL MEDICINE | Facility: CLINIC | Age: 42
End: 2017-09-07

## 2017-09-07 VITALS
BODY MASS INDEX: 24.67 KG/M2 | RESPIRATION RATE: 16 BRPM | SYSTOLIC BLOOD PRESSURE: 120 MMHG | TEMPERATURE: 98.4 F | WEIGHT: 146 LBS | DIASTOLIC BLOOD PRESSURE: 74 MMHG | HEART RATE: 86 BPM

## 2017-09-07 DIAGNOSIS — L72.9 INFECTED CYST OF SKIN: Primary | ICD-10-CM

## 2017-09-07 DIAGNOSIS — L08.9 INFECTED CYST OF SKIN: Primary | ICD-10-CM

## 2017-09-07 PROCEDURE — 99214 OFFICE O/P EST MOD 30 MIN: CPT | Performed by: INTERNAL MEDICINE

## 2017-09-07 RX ORDER — SULFAMETHOXAZOLE AND TRIMETHOPRIM 800; 160 MG/1; MG/1
1 TABLET ORAL 2 TIMES DAILY
Qty: 20 TABLET | Refills: 0 | Status: SHIPPED | OUTPATIENT
Start: 2017-09-07 | End: 2017-09-17

## 2017-09-07 NOTE — PROGRESS NOTES
Subjective       Maryana Love is a 42 y.o. female.     Chief Complaint   Patient presents with   • Cyst       History obtained from the patient.      Cyst   This is a recurrent problem. Episode onset: 3-4 days ago. The problem has been gradually worsening (Increasing in size, redness, and pain.  No drainage.). Associated symptoms include fatigue (not new). Pertinent negatives include no abdominal pain, arthralgias, chest pain, chills, congestion, coughing, fever, headaches, joint swelling, myalgias, nausea, rash, sore throat, swollen glands or vomiting. She has tried NSAIDs for the symptoms. The treatment provided mild relief.      The patient states she has had this cyst for 10 years and it has been infected once before.    The following portions of the patient's history were reviewed and updated as appropriate: allergies, current medications, past family history, past medical history, past social history, past surgical history and problem list.      Review of Systems   Constitutional: Positive for fatigue (not new). Negative for chills, fever and unexpected weight change.   HENT: Negative for congestion, ear pain, rhinorrhea and sore throat.    Respiratory: Negative for cough, shortness of breath and wheezing.    Cardiovascular: Negative for chest pain.   Gastrointestinal: Negative for abdominal pain, blood in stool, diarrhea, nausea and vomiting.        Denies melena.     Genitourinary: Negative for dysuria, frequency, hematuria, pelvic pain and urgency.   Musculoskeletal: Negative for arthralgias, joint swelling and myalgias.   Skin: Negative for rash.   Neurological: Negative for headaches.   Hematological: Negative for adenopathy.         not currently breastfeeding.      Objective     Physical Exam   Constitutional: She appears well-developed and well-nourished.   Skin: No rash noted.   Soft, erythematous, slightly warm, tender nodule right upper quadrant abdomen   Psychiatric: She has a normal mood and  affect.   Nursing note and vitals reviewed.        Assessment/Plan   Maryana was seen today for cyst.    Diagnoses and all orders for this visit:    Infected cyst of skin  -     sulfamethoxazole-trimethoprim (BACTRIM DS) 800-160 MG per tablet; Take 1 tablet by mouth 2 (Two) Times a Day for 10 days.  -     Ambulatory Referral to General Surgery        Return if symptoms worsen or fail to improve.

## 2017-10-06 ENCOUNTER — LAB REQUISITION (OUTPATIENT)
Dept: LAB | Facility: HOSPITAL | Age: 42
End: 2017-10-06

## 2017-10-06 DIAGNOSIS — L72.3 SEBACEOUS CYST: ICD-10-CM

## 2017-10-06 PROCEDURE — 88305 TISSUE EXAM BY PATHOLOGIST: CPT | Performed by: SURGERY

## 2017-10-09 LAB
CYTO UR: NORMAL
LAB AP CASE REPORT: NORMAL
LAB AP CLINICAL INFORMATION: NORMAL
Lab: NORMAL
PATH REPORT.FINAL DX SPEC: NORMAL
PATH REPORT.GROSS SPEC: NORMAL

## 2017-10-16 PROBLEM — E66.9 OBESITY: Status: ACTIVE | Noted: 2017-10-16

## 2017-10-16 PROBLEM — Z72.0 TOBACCO USE: Status: ACTIVE | Noted: 2017-10-16

## 2017-10-24 ENCOUNTER — OFFICE VISIT (OUTPATIENT)
Dept: NEUROLOGY | Facility: CLINIC | Age: 42
End: 2017-10-24

## 2017-10-24 VITALS
WEIGHT: 141 LBS | OXYGEN SATURATION: 97 % | SYSTOLIC BLOOD PRESSURE: 118 MMHG | DIASTOLIC BLOOD PRESSURE: 78 MMHG | HEIGHT: 65 IN | HEART RATE: 62 BPM | BODY MASS INDEX: 23.49 KG/M2

## 2017-10-24 DIAGNOSIS — R25.1 TREMOR: Primary | ICD-10-CM

## 2017-10-24 PROCEDURE — 99213 OFFICE O/P EST LOW 20 MIN: CPT | Performed by: PSYCHIATRY & NEUROLOGY

## 2017-10-24 RX ORDER — TOPIRAMATE 25 MG/1
TABLET ORAL
Qty: 120 TABLET | Refills: 3 | Status: SHIPPED | OUTPATIENT
Start: 2017-10-24 | End: 2017-12-20 | Stop reason: SDUPTHER

## 2017-10-24 NOTE — PROGRESS NOTES
Subjective:     Patient ID: Maryana Love is a 42 y.o. female.  Chief Complaint   Patient presents with   • Tremors       History of Present Illness     42 y.o.  woman returns in follow up for tremors.  Last visit on 5/8/17 ordered labs.    CBC, CMP, TSH - [facility]       Tremor worsened to point it is interfering with typing, eating with a fork.  Uses a bottle to drink and not spill.  Avoids putting on make up.  Worse in the evenings with fatigue.       Problem history:    Developed tremors in hands for approximately 20 years.  Multiple family members with tremor.  Tremor starting to interfering with writing and holding glasses.  Present with posture and movement.  Not present at rest.   Excitement and fatigue worsen sx, rest improves.    Reviewed Dr Camargo's notes:    Reported to Dr Camargo on 3/29/17 ordered labs but not yet performed.  PMH of left breast cancer.       The following portions of the patient's history were reviewed and updated as appropriate: allergies, current medications, past family history, past medical history, past social history, past surgical history and problem list.    Review of Systems   Constitutional: Negative for activity change, fatigue and unexpected weight change.   HENT: Negative for tinnitus and trouble swallowing.    Eyes: Negative for photophobia and visual disturbance.   Respiratory: Negative for apnea, cough and choking.    Cardiovascular: Negative for leg swelling.   Gastrointestinal: Negative for nausea and vomiting.   Endocrine: Negative for cold intolerance and heat intolerance.   Genitourinary: Negative for difficulty urinating, frequency, menstrual problem and urgency.   Musculoskeletal: Negative for back pain, gait problem, myalgias and neck pain.   Skin: Negative for color change and rash.   Allergic/Immunologic: Negative for immunocompromised state.   Neurological: Positive for tremors. Negative for dizziness, seizures, syncope, facial asymmetry, speech difficulty, weakness,  "light-headedness, numbness and headaches.   Hematological: Negative for adenopathy. Does not bruise/bleed easily.   Psychiatric/Behavioral: Negative for behavioral problems, confusion, decreased concentration, hallucinations and sleep disturbance.        Objective:  Vitals:    10/24/17 0955   BP: 118/78   Pulse: 62   SpO2: 97%   Weight: 141 lb (64 kg)   Height: 64.5\" (163.8 cm)       Neurologic Exam     Mental Status   Registration: recalls 3 of 3 objects. Recall at 5 minutes: recalls 3 of 3 objects. Follows 3 step commands.   Attention: normal. Concentration: normal.   Level of consciousness: alert  Knowledge: good and consistent with education.   Able to name object. Able to read. Able to repeat. Able to write. Normal comprehension.     Cranial Nerves     CN II   Visual fields full to confrontation.   Visual acuity: normal  Right visual field deficit: none  Left visual field deficit: none     CN III, IV, VI   Right pupil: Shape: regular. Reactivity: brisk. Consensual response: intact.   Left pupil: Shape: regular. Reactivity: brisk. Consensual response: intact.   Nystagmus: none   Diplopia: none  Ophthalmoparesis: none  Upgaze: normal  Downgaze: normal  Conjugate gaze: present  Vestibulo-ocular reflex: present    CN V   Facial sensation intact.   Right corneal reflex: normal  Left corneal reflex: normal    CN VII   Right facial weakness: none  Left facial weakness: none    CN VIII   Hearing: intact    CN IX, X   Palate: symmetric  Right gag reflex: normal  Left gag reflex: normal    CN XI   Right sternocleidomastoid strength: normal  Left sternocleidomastoid strength: normal    CN XII   Tongue: not atrophic  Fasciculations: absent  Tongue deviation: none    Motor Exam   Muscle bulk: normal  Overall muscle tone: normal  Right arm tone: normal  Left arm tone: normal  Right leg tone: normal  Left leg tone: normal    Sensory Exam   Light touch normal.   Vibration normal.   Proprioception normal.   Pinprick normal. "     Gait, Coordination, and Reflexes     Tremor   Resting tremor: absent  Intention tremor: absent  Action tremor: left arm and right arm    Reflexes   Reflexes 2+ except as noted.       Physical Exam   Constitutional: She appears well-developed and well-nourished.   Nursing note and vitals reviewed.    .lastvi  Assessment/Plan:       Problems Addressed this Visit        Other    Tremor - Primary     Add Topamax titration

## 2017-12-19 ENCOUNTER — OFFICE VISIT (OUTPATIENT)
Dept: INTERNAL MEDICINE | Facility: CLINIC | Age: 42
End: 2017-12-19

## 2017-12-19 ENCOUNTER — TELEPHONE (OUTPATIENT)
Dept: INTERNAL MEDICINE | Facility: CLINIC | Age: 42
End: 2017-12-19

## 2017-12-19 VITALS
HEART RATE: 80 BPM | BODY MASS INDEX: 23.66 KG/M2 | WEIGHT: 140 LBS | SYSTOLIC BLOOD PRESSURE: 80 MMHG | RESPIRATION RATE: 20 BRPM | DIASTOLIC BLOOD PRESSURE: 58 MMHG | TEMPERATURE: 97.8 F

## 2017-12-19 DIAGNOSIS — N39.0 URINARY TRACT INFECTION WITHOUT HEMATURIA, SITE UNSPECIFIED: Primary | ICD-10-CM

## 2017-12-19 DIAGNOSIS — F41.1 GENERALIZED ANXIETY DISORDER: ICD-10-CM

## 2017-12-19 PROBLEM — E66.9 OBESITY: Status: RESOLVED | Noted: 2017-10-16 | Resolved: 2017-12-19

## 2017-12-19 PROCEDURE — 87086 URINE CULTURE/COLONY COUNT: CPT | Performed by: INTERNAL MEDICINE

## 2017-12-19 PROCEDURE — 87186 SC STD MICRODIL/AGAR DIL: CPT | Performed by: INTERNAL MEDICINE

## 2017-12-19 PROCEDURE — 87077 CULTURE AEROBIC IDENTIFY: CPT | Performed by: INTERNAL MEDICINE

## 2017-12-19 PROCEDURE — 99214 OFFICE O/P EST MOD 30 MIN: CPT | Performed by: INTERNAL MEDICINE

## 2017-12-19 RX ORDER — ALPRAZOLAM 0.25 MG/1
0.25 TABLET ORAL 3 TIMES DAILY PRN
Qty: 20 TABLET | Refills: 0 | Status: SHIPPED | OUTPATIENT
Start: 2017-12-19 | End: 2018-05-07 | Stop reason: SDUPTHER

## 2017-12-19 RX ORDER — SULFAMETHOXAZOLE AND TRIMETHOPRIM 800; 160 MG/1; MG/1
1 TABLET ORAL 2 TIMES DAILY
Qty: 20 TABLET | Refills: 0 | Status: SHIPPED | OUTPATIENT
Start: 2017-12-19 | End: 2017-12-29

## 2017-12-19 NOTE — TELEPHONE ENCOUNTER
Maryana notified   She we can work her in at 945am    Notified there may be a bit of a wait is she is a work in .  Verb understanding given

## 2017-12-19 NOTE — PROGRESS NOTES
Subjective       Maryana Love is a 42 y.o. female.     Chief Complaint   Patient presents with   • Urinary Tract Infection       History obtained from the patient.      Urinary Tract Infection    This is a new problem. Episode onset: 2 days ago. The problem occurs every urination. The problem has been gradually worsening. The quality of the pain is described as stabbing. The pain is moderate. There has been no fever. She is sexually active. There is no history of pyelonephritis. Associated symptoms include frequency and urgency. Pertinent negatives include no chills, discharge, flank pain, hematuria, nausea or vomiting. Treatments tried: Azo. The treatment provided moderate relief. Her past medical history is significant for recurrent UTIs. There is no history of kidney stones.      3 weeks ago,  she took left over Bactrim BID x 7 days for UTI symptoms.  The symptoms got better, but did not resolve. She got a yeast infection, resolved with Monistat.    The patient is going to [place] for 3 days next week.  She has Anxiety, well controlled on Wellbutrin XL.  She is requesting a rx for Valium or Alprazalom to help her deal with the crowds.    The following portions of the patient's history were reviewed and updated as appropriate: allergies, current medications, past family history, past medical history, past social history, past surgical history and problem list.      Review of Systems   Constitutional: Negative for chills.   Respiratory: Negative for shortness of breath.    Cardiovascular: Negative for chest pain.   Gastrointestinal: Positive for constipation. Negative for abdominal pain, diarrhea, nausea and vomiting.   Genitourinary: Positive for dysuria, frequency, pelvic pain and urgency. Negative for flank pain, hematuria, vaginal bleeding and vaginal discharge.   Musculoskeletal: Negative for arthralgias and myalgias.   Skin: Negative for rash.   Neurological: Negative for headaches.   Hematological:  Negative for adenopathy.         Blood pressure (!) 80/58, pulse 80, temperature 97.8 °F (36.6 °C), temperature source Temporal Artery , resp. rate 20, weight 63.5 kg (140 lb), not currently breastfeeding.      Objective     Physical Exam   Constitutional: She appears well-developed and well-nourished.   Cardiovascular: Normal rate, regular rhythm and normal heart sounds.    No murmur heard.  Pulmonary/Chest: Effort normal and breath sounds normal.   Abdominal: Soft. Bowel sounds are normal. She exhibits no distension and no mass. There is no hepatosplenomegaly. There is tenderness (right pelvis). There is no rebound, no guarding and no CVA tenderness.   Lymphadenopathy:        Right: No inguinal adenopathy present.        Left: No inguinal adenopathy present.   Neurological: She is alert.   Skin: No rash noted.   Psychiatric: She has a normal mood and affect.   Nursing note and vitals reviewed.        Assessment/Plan   Maryana was seen today for urinary tract infection.    Diagnoses and all orders for this visit:    Urinary tract infection without hematuria, site unspecified  -     Urine Culture - Urine, Urine, Clean Catch  -     sulfamethoxazole-trimethoprim (BACTRIM DS) 800-160 MG per tablet; Take 1 tablet by mouth 2 (Two) Times a Day for 10 days.   U/A not done due to Azo use.    Generalized anxiety disorder  -     ALPRAZolam (XANAX) 0.25 MG tablet; Take 1 tablet by mouth 3 (Three) Times a Day As Needed for Anxiety.       The patient was instructed in the side effects of the medication. Risks of the potential for tolerance, dependence, and addiction were discussed. The patient was instructed to take the lowest dosage of the medication, at the lowest frequency, and for the shortest period of time possible. The patient was instructed not to receive controlled substances or narcotics from other doctors, and not to giveaway or sell the medication.      The patient was instructed to abstain from illicit drug use. He  was instructed to abstain from alcohol when taking this medication.      Narcotics/controlled substance agreement, Art report, and Urine Drug Screen were updated today if needed.       Return if symptoms worsen or fail to improve.

## 2017-12-19 NOTE — TELEPHONE ENCOUNTER
Pt calling to get apt for UTI. Offered her 1:30 (going straight down for apts) but she says she has an apt at 2pm that cannot be rescheduled. Please advise if you can work her in

## 2017-12-20 ENCOUNTER — OFFICE VISIT (OUTPATIENT)
Dept: NEUROLOGY | Facility: CLINIC | Age: 42
End: 2017-12-20

## 2017-12-20 VITALS
HEART RATE: 104 BPM | BODY MASS INDEX: 22.88 KG/M2 | SYSTOLIC BLOOD PRESSURE: 100 MMHG | OXYGEN SATURATION: 98 % | WEIGHT: 134 LBS | DIASTOLIC BLOOD PRESSURE: 62 MMHG | HEIGHT: 64 IN

## 2017-12-20 DIAGNOSIS — R25.1 TREMOR: Primary | ICD-10-CM

## 2017-12-20 PROCEDURE — 99212 OFFICE O/P EST SF 10 MIN: CPT | Performed by: PSYCHIATRY & NEUROLOGY

## 2017-12-20 RX ORDER — TOPIRAMATE 50 MG/1
50 TABLET, FILM COATED ORAL 2 TIMES DAILY
Qty: 60 TABLET | Refills: 11 | Status: SHIPPED | OUTPATIENT
Start: 2017-12-20 | End: 2019-04-18

## 2017-12-20 NOTE — PROGRESS NOTES
Subjective:     Patient ID: Maryana Love is a 42 y.o. female.  Chief Complaint   Patient presents with   • Tremors       History of Present Illness     42 y.o.  woman returns in follow up for tremors.  Last visit on 10/24/17 added TPM.       Tremors in hands improved with TPM.  Able to feed herself, type and button clothes.  Denies side effects from TPM.      Problem history:    Developed tremors in hands for approximately 20 years.  Multiple family members with tremor.  Tremor starting to interfering with writing and holding glasses.  Present with posture and movement.  Not present at rest.   Excitement and fatigue worsen sx, rest improves.    Reviewed Dr Camargo's notes:    Reported to Dr Camargo on 3/29/17 ordered labs but not yet performed.  PMH of left breast cancer.       The following portions of the patient's history were reviewed and updated as appropriate: allergies, current medications, past family history, past medical history, past social history, past surgical history and problem list.    Review of Systems   Constitutional: Negative for activity change, fatigue and unexpected weight change.   HENT: Negative for tinnitus and trouble swallowing.    Eyes: Negative for photophobia and visual disturbance.   Respiratory: Negative for apnea, cough and choking.    Cardiovascular: Negative for leg swelling.   Gastrointestinal: Negative for nausea and vomiting.   Endocrine: Negative for cold intolerance and heat intolerance.   Genitourinary: Negative for difficulty urinating, frequency, menstrual problem and urgency.   Musculoskeletal: Negative for back pain, gait problem, myalgias and neck pain.   Skin: Negative for color change and rash.   Allergic/Immunologic: Negative for immunocompromised state.   Neurological: Positive for tremors. Negative for dizziness, seizures, syncope, facial asymmetry, speech difficulty, weakness, light-headedness, numbness and headaches.   Hematological: Negative for adenopathy. Does  "not bruise/bleed easily.   Psychiatric/Behavioral: Negative for behavioral problems, confusion, decreased concentration, hallucinations and sleep disturbance.        Objective:  Vitals:    12/20/17 1135   BP: 100/62   BP Location: Left arm   Patient Position: Sitting   Cuff Size: Adult   Pulse: 104   SpO2: 98%   Weight: 60.8 kg (134 lb)   Height: 163.8 cm (64.49\")       Neurologic Exam     Mental Status   Registration: recalls 3 of 3 objects. Recall at 5 minutes: recalls 3 of 3 objects. Follows 3 step commands.   Attention: normal. Concentration: normal.   Level of consciousness: alert  Knowledge: good and consistent with education.   Able to name object. Able to read. Able to repeat. Able to write. Normal comprehension.     Cranial Nerves     CN II   Visual fields full to confrontation.   Visual acuity: normal  Right visual field deficit: none  Left visual field deficit: none     CN III, IV, VI   Right pupil: Shape: regular. Reactivity: brisk. Consensual response: intact.   Left pupil: Shape: regular. Reactivity: brisk. Consensual response: intact.   Nystagmus: none   Diplopia: none  Ophthalmoparesis: none  Upgaze: normal  Downgaze: normal  Conjugate gaze: present  Vestibulo-ocular reflex: present    CN V   Facial sensation intact.   Right corneal reflex: normal  Left corneal reflex: normal    CN VII   Right facial weakness: none  Left facial weakness: none    CN VIII   Hearing: intact    CN IX, X   Palate: symmetric  Right gag reflex: normal  Left gag reflex: normal    CN XI   Right sternocleidomastoid strength: normal  Left sternocleidomastoid strength: normal    CN XII   Tongue: not atrophic  Fasciculations: absent  Tongue deviation: none    Motor Exam   Muscle bulk: normal  Overall muscle tone: normal  Right arm tone: normal  Left arm tone: normal  Right leg tone: normal  Left leg tone: normal    Sensory Exam   Light touch normal.   Vibration normal.   Proprioception normal.   Pinprick normal.     Gait, " Coordination, and Reflexes     Tremor   Resting tremor: absent  Intention tremor: absent  Action tremor: left arm and right arm    Reflexes   Reflexes 2+ except as noted.       Physical Exam   Constitutional: She appears well-developed and well-nourished.   Nursing note and vitals reviewed.    .lastvi  Assessment/Plan:       Problems Addressed this Visit        Other    Tremor - Primary     Tremor improved with TPM 50 mg BID

## 2017-12-21 LAB
BACTERIA SPEC AEROBE CULT: ABNORMAL
BACTERIA SPEC AEROBE CULT: ABNORMAL

## 2018-02-08 ENCOUNTER — TELEPHONE (OUTPATIENT)
Dept: INTERNAL MEDICINE | Facility: CLINIC | Age: 43
End: 2018-02-08

## 2018-02-08 DIAGNOSIS — N39.0 URINARY TRACT INFECTION WITHOUT HEMATURIA, SITE UNSPECIFIED: Primary | ICD-10-CM

## 2018-02-08 RX ORDER — SULFAMETHOXAZOLE AND TRIMETHOPRIM 800; 160 MG/1; MG/1
1 TABLET ORAL 2 TIMES DAILY
Qty: 20 TABLET | Refills: 0 | Status: SHIPPED | OUTPATIENT
Start: 2018-02-08 | End: 2018-02-12

## 2018-02-08 NOTE — TELEPHONE ENCOUNTER
Spoke with pt, she is going to stop by for a u/a before picking up the Rx. I have sent the Bactrim electronically to pharmacy.

## 2018-02-08 NOTE — TELEPHONE ENCOUNTER
----- Message from Osiris Bailey sent at 2/8/2018 11:34 AM EST -----  Pt has a UTI, she wants to know if you can call something in for her. She has been self medicating with doxycycline but it has not been helping.  Please call in bactrim for pt, at her request  P. [phone]    [facility]: [facility]

## 2018-02-08 NOTE — TELEPHONE ENCOUNTER
Ok to call in Bactrim DS, 1 po BID x 10 days.  Needs to be seen if no better in 2-3 days.      Can she stop by today for a u/s prior to starting the antibiotics?

## 2018-02-09 ENCOUNTER — LAB (OUTPATIENT)
Dept: INTERNAL MEDICINE | Facility: CLINIC | Age: 43
End: 2018-02-09

## 2018-02-09 DIAGNOSIS — N39.0 URINARY TRACT INFECTION WITHOUT HEMATURIA, SITE UNSPECIFIED: ICD-10-CM

## 2018-02-09 LAB
BILIRUB BLD-MCNC: NEGATIVE MG/DL
CLARITY, POC: ABNORMAL
COLOR UR: YELLOW
EXPIRATION DATE: ABNORMAL
GLUCOSE UR STRIP-MCNC: NEGATIVE MG/DL
KETONES UR QL: NEGATIVE
LEUKOCYTE EST, POC: ABNORMAL
Lab: ABNORMAL
NITRITE UR-MCNC: POSITIVE MG/ML
PH UR: 7 [PH] (ref 5–8)
PROT UR STRIP-MCNC: NEGATIVE MG/DL
RBC # UR STRIP: ABNORMAL /UL
SP GR UR: 1.01 (ref 1–1.03)
UROBILINOGEN UR QL: NORMAL

## 2018-02-09 PROCEDURE — 87077 CULTURE AEROBIC IDENTIFY: CPT | Performed by: INTERNAL MEDICINE

## 2018-02-09 PROCEDURE — 87086 URINE CULTURE/COLONY COUNT: CPT | Performed by: INTERNAL MEDICINE

## 2018-02-09 PROCEDURE — 81003 URINALYSIS AUTO W/O SCOPE: CPT | Performed by: INTERNAL MEDICINE

## 2018-02-09 PROCEDURE — 87186 SC STD MICRODIL/AGAR DIL: CPT | Performed by: INTERNAL MEDICINE

## 2018-02-11 LAB
BACTERIA SPEC AEROBE CULT: ABNORMAL
BACTERIA SPEC AEROBE CULT: ABNORMAL

## 2018-02-12 ENCOUNTER — TELEPHONE (OUTPATIENT)
Dept: INTERNAL MEDICINE | Facility: CLINIC | Age: 43
End: 2018-02-12

## 2018-02-12 RX ORDER — CEFDINIR 300 MG/1
300 CAPSULE ORAL 2 TIMES DAILY
Qty: 14 CAPSULE | Refills: 0 | Status: SHIPPED | OUTPATIENT
Start: 2018-02-12 | End: 2018-05-22

## 2018-02-12 NOTE — TELEPHONE ENCOUNTER
When I called her about there UA [place] she stated the   /105  Spoke with Dr Camargo as she wanted to be seen today.  Dr Camargo states her scheduled is full but can see her tomorrow.  She sounded quite anxious on the phone.  Scheduled her for 8am in morning and advised her to do some deep breathing and lay down in a dark room and try and relaxed or to try some meditation.  Advised if her symptoms worsened to go to the ER .   Verb understanding given     Notified her of the UA results and that Dr Camargo was changing her antibiotic  Rx sent to [facility].  Maryana notified   Gave verb understanding.

## 2018-02-12 NOTE — TELEPHONE ENCOUNTER
----- Message from Aracely Vera sent at 2/12/2018 10:34 AM EST -----  Contact: SELF  RAYMOND CORONADO CALLING, SHE SAID THE BACTRIM SHE WAS PRESCRIBED IS NOT WORKING FOR HER. SHE USES THE [facility]. SHE CAN BE REACHED [phone]

## 2018-02-12 NOTE — TELEPHONE ENCOUNTER
Urine culture results reviewed.  Bacteria resistant to Bactrim.  Change to Cefdinir 300 mg, 1 po BID x 7 days.

## 2018-02-13 ENCOUNTER — OFFICE VISIT (OUTPATIENT)
Dept: INTERNAL MEDICINE | Facility: CLINIC | Age: 43
End: 2018-02-13

## 2018-02-13 VITALS
WEIGHT: 134.5 LBS | SYSTOLIC BLOOD PRESSURE: 104 MMHG | BODY MASS INDEX: 22.74 KG/M2 | DIASTOLIC BLOOD PRESSURE: 70 MMHG | HEART RATE: 72 BPM | TEMPERATURE: 97.7 F | RESPIRATION RATE: 20 BRPM

## 2018-02-13 DIAGNOSIS — R03.0 ELEVATED BLOOD PRESSURE READING: Primary | ICD-10-CM

## 2018-02-13 PROCEDURE — 99213 OFFICE O/P EST LOW 20 MIN: CPT | Performed by: INTERNAL MEDICINE

## 2018-03-05 RX ORDER — TAMOXIFEN CITRATE 20 MG/1
TABLET ORAL
Qty: 90 TABLET | Refills: 3 | Status: SHIPPED | OUTPATIENT
Start: 2018-03-05 | End: 2019-04-18

## 2018-03-09 ENCOUNTER — TELEPHONE (OUTPATIENT)
Dept: INTERNAL MEDICINE | Facility: CLINIC | Age: 43
End: 2018-03-09

## 2018-03-09 DIAGNOSIS — N39.0 RECURRENT UTI: Primary | ICD-10-CM

## 2018-03-09 NOTE — TELEPHONE ENCOUNTER
----- Message from Andreia Ravi sent at 3/9/2018 11:00 AM EST -----  PT IS NEEDING A REFERRAL TO DR. NEGRETE FOR CONSTANT UTI'S    PLEASE CALL PATIENT AT: [phone]    THANK YOU.

## 2018-03-19 ENCOUNTER — OFFICE VISIT (OUTPATIENT)
Dept: ONCOLOGY | Facility: CLINIC | Age: 43
End: 2018-03-19

## 2018-03-19 VITALS
BODY MASS INDEX: 22.66 KG/M2 | SYSTOLIC BLOOD PRESSURE: 113 MMHG | RESPIRATION RATE: 16 BRPM | HEART RATE: 96 BPM | DIASTOLIC BLOOD PRESSURE: 61 MMHG | HEIGHT: 65 IN | WEIGHT: 136 LBS | TEMPERATURE: 98.6 F

## 2018-03-19 DIAGNOSIS — Z17.0 MALIGNANT NEOPLASM OF UPPER-OUTER QUADRANT OF LEFT BREAST IN FEMALE, ESTROGEN RECEPTOR POSITIVE (HCC): Primary | ICD-10-CM

## 2018-03-19 DIAGNOSIS — C50.412 MALIGNANT NEOPLASM OF UPPER-OUTER QUADRANT OF LEFT BREAST IN FEMALE, ESTROGEN RECEPTOR POSITIVE (HCC): Primary | ICD-10-CM

## 2018-03-19 PROCEDURE — 99214 OFFICE O/P EST MOD 30 MIN: CPT | Performed by: INTERNAL MEDICINE

## 2018-03-20 NOTE — PROGRESS NOTES
PROBLEM LIST:  Oncology/Hematology History    1. Stage IIA, ER/MO positive, HER-2 negative left breast cancer measuring  about 2.3 cm in largest dimension. Dx: 8/18/2015   2. Oncotype DX score of 17 placing her at low risk for recurrence.   3. Currently on tamoxifen initiated in 11/2015.  4. Genetic testing was negative with the BreastNext panel.         Malignant neoplasm of upper-outer quadrant of left female breast    8/18/2015 Initial Diagnosis     Malignant neoplasm of upper-outer quadrant of left female breast          REASON FOR VISIT: Stage II breast cancer with axillary adenopathy    HISTORY OF PRESENT ILLNESS:   42-year-old lady with stage II breast cancer presents today for follow-up.  She is currently 2.5 years out from her diagnosis She is tolerating tamoxifen without any side effects. She however is having scan several issues with weight loss.  She also has fair bit of anxiety related to recurrence.  I'm also concerned that she may be having a manic episode.  She denies that.  She also has significant fatigue.    Past medical history, social history and family history was reviewed and unchanged from prior visit.    Review of Systems:    Review of Systems   Constitutional: Positive for unexpected weight change.   HENT:  Negative.    Eyes: Negative.    Respiratory: Negative.    Cardiovascular: Negative.    Gastrointestinal: Negative.    Endocrine: Negative.    Genitourinary: Negative.     Musculoskeletal: Negative.    Skin: Negative.    Neurological: Negative.    Hematological: Negative.    Psychiatric/Behavioral: The patient is nervous/anxious.       A comprehensive 14 point review of systems was performed and was negative except as mentioned.      Medications:  The current medication list was reviewed in the EMR    ALLERGIES:    Allergies   Allergen Reactions   • Macrobid [Nitrofurantoin Monohyd Macro]    • Macrodantin [Nitrofurantoin Macrocrystal]    • Nitrofurantoin Hives   • Percocet  "[Oxycodone-Acetaminophen] Hives         Physical Exam    VITAL SIGNS:  /61 Comment: RUE  Pulse 96   Temp 98.6 °F (37 °C) (Temporal Artery )   Resp 16   Ht 163.8 cm (64.5\")   Wt 61.7 kg (136 lb)   LMP  (LMP Unknown) Comment: LAST MAMOGRAM 8/2015  BMI 22.98 kg/m²      Performance Status: 0    General: well appearing, in no acute distress  HEENT: sclera anicteric, oropharynx clear, neck is supple  Lymphatics:subtle small lymph node on the left  Cardiovascular: regular rate and rhythm, no murmurs, rubs or gallops  Lungs: clear to auscultation bilaterally  Abdomen: soft, nontender, nondistended.  No palpable organomegaly  Extremities: no lower extremity edema  Skin: no rashes, lesions, bruising, or petechiae  Msk:  Shows no weakness of the large muscle groups  Psych: pressured speech         RECENT LABS:    Lab Results   Component Value Date    WBC 5.37 05/11/2017    HGB 12.8 05/11/2017    HCT 40.4 05/11/2017    MCV 99.3 (H) 05/11/2017     05/11/2017      Lab Results   Component Value Date    GLUCOSE 85 05/11/2017    BUN 18 05/11/2017    CREATININE 0.90 05/11/2017    EGFRIFNONA 69 05/11/2017    BCR 20.0 05/11/2017    CO2 32.0 (H) 05/11/2017    CALCIUM 9.6 05/11/2017    ALBUMIN 4.00 05/11/2017    LABIL2 1.5 05/11/2017    AST 23 05/11/2017    ALT 16 05/11/2017         Assessment/Plan    1. stage II a ER/[place] positive low grade left upper quadrant breast cancer.   Plan for CAT scan of the chest abdomen pelvis to see where we stand with her disease.  The unexplained weight loss is a concern that this could be related to her underlying psychiatric issues.  Continue tamoxifen from now.  I will also draw labs to see if there is any changes.    2.  Bipolar disorder: Concern for manic episode for now.  Patient reports that she is doing ok.      Return to clinic in 6 months unless there is a obvious recurrence on her CAT scan.    I spent 25 minutes on the patient's plan and care with more than 50% of the time " spent counseling the patient.    Coleen Bautista MD  [facility]    3/20/2018         Please note that portions of this note may have been completed with a voice recognition program. Efforts were made to edit the dictations, but occasionally words are mistranscribed.

## 2018-03-21 ENCOUNTER — HOSPITAL ENCOUNTER (OUTPATIENT)
Dept: CT IMAGING | Facility: HOSPITAL | Age: 43
Discharge: HOME | End: 2018-03-21
Attending: INTERNAL MEDICINE | Admitting: INTERNAL MEDICINE

## 2018-03-21 ENCOUNTER — LAB (OUTPATIENT)
Dept: LAB | Facility: HOSPITAL | Age: 43
End: 2018-03-21

## 2018-03-21 DIAGNOSIS — Z17.0 MALIGNANT NEOPLASM OF UPPER-OUTER QUADRANT OF LEFT BREAST IN FEMALE, ESTROGEN RECEPTOR POSITIVE (HCC): ICD-10-CM

## 2018-03-21 DIAGNOSIS — C50.412 MALIGNANT NEOPLASM OF UPPER-OUTER QUADRANT OF LEFT BREAST IN FEMALE, ESTROGEN RECEPTOR POSITIVE (HCC): ICD-10-CM

## 2018-03-21 LAB
ALBUMIN SERPL-MCNC: 4.6 G/DL (ref 3.2–4.8)
ALBUMIN/GLOB SERPL: 1.8 G/DL (ref 1.5–2.5)
ALP SERPL-CCNC: 61 U/L (ref 25–100)
ALT SERPL W P-5'-P-CCNC: 13 U/L (ref 7–40)
ANION GAP SERPL CALCULATED.3IONS-SCNC: 6 MMOL/L (ref 3–11)
AST SERPL-CCNC: 16 U/L (ref 0–33)
BASOPHILS # BLD AUTO: 0.06 10*3/MM3 (ref 0–0.2)
BASOPHILS NFR BLD AUTO: 0.9 % (ref 0–1)
BILIRUB SERPL-MCNC: 0.6 MG/DL (ref 0.3–1.2)
BUN BLD-MCNC: 15 MG/DL (ref 9–23)
BUN/CREAT SERPL: 18.8 (ref 7–25)
CALCIUM SPEC-SCNC: 9.4 MG/DL (ref 8.7–10.4)
CHLORIDE SERPL-SCNC: 109 MMOL/L (ref 99–109)
CO2 SERPL-SCNC: 24 MMOL/L (ref 20–31)
CREAT BLD-MCNC: 0.8 MG/DL (ref 0.6–1.3)
DEPRECATED RDW RBC AUTO: 43.9 FL (ref 37–54)
EOSINOPHIL # BLD AUTO: 0.3 10*3/MM3 (ref 0–0.3)
EOSINOPHIL NFR BLD AUTO: 4.6 % (ref 0–3)
ERYTHROCYTE [DISTWIDTH] IN BLOOD BY AUTOMATED COUNT: 12.3 % (ref 11.3–14.5)
GFR SERPL CREATININE-BSD FRML MDRD: 79 ML/MIN/1.73
GLOBULIN UR ELPH-MCNC: 2.5 GM/DL
GLUCOSE BLD-MCNC: 78 MG/DL (ref 70–100)
HCT VFR BLD AUTO: 43.7 % (ref 34.5–44)
HGB BLD-MCNC: 14.2 G/DL (ref 11.5–15.5)
IMM GRANULOCYTES # BLD: 0.02 10*3/MM3 (ref 0–0.03)
IMM GRANULOCYTES NFR BLD: 0.3 % (ref 0–0.6)
LYMPHOCYTES # BLD AUTO: 2.4 10*3/MM3 (ref 0.6–4.8)
LYMPHOCYTES NFR BLD AUTO: 36.7 % (ref 24–44)
MCH RBC QN AUTO: 31.8 PG (ref 27–31)
MCHC RBC AUTO-ENTMCNC: 32.5 G/DL (ref 32–36)
MCV RBC AUTO: 98 FL (ref 80–99)
MONOCYTES # BLD AUTO: 0.41 10*3/MM3 (ref 0–1)
MONOCYTES NFR BLD AUTO: 6.3 % (ref 0–12)
NEUTROPHILS # BLD AUTO: 3.35 10*3/MM3 (ref 1.5–8.3)
NEUTROPHILS NFR BLD AUTO: 51.2 % (ref 41–71)
PLATELET # BLD AUTO: 293 10*3/MM3 (ref 150–450)
PMV BLD AUTO: 10.8 FL (ref 6–12)
POTASSIUM BLD-SCNC: 4.3 MMOL/L (ref 3.5–5.5)
PROT SERPL-MCNC: 7.1 G/DL (ref 5.7–8.2)
RBC # BLD AUTO: 4.46 10*6/MM3 (ref 3.89–5.14)
SODIUM BLD-SCNC: 139 MMOL/L (ref 132–146)
WBC NRBC COR # BLD: 6.54 10*3/MM3 (ref 3.5–10.8)

## 2018-03-21 PROCEDURE — 74177 CT ABD & PELVIS W/CONTRAST: CPT

## 2018-03-21 PROCEDURE — 80053 COMPREHEN METABOLIC PANEL: CPT

## 2018-03-21 PROCEDURE — 71260 CT THORAX DX C+: CPT

## 2018-03-21 PROCEDURE — 36415 COLL VENOUS BLD VENIPUNCTURE: CPT

## 2018-03-21 PROCEDURE — 25010000002 IOPAMIDOL 61 % SOLUTION: Performed by: INTERNAL MEDICINE

## 2018-03-21 PROCEDURE — 85025 COMPLETE CBC W/AUTO DIFF WBC: CPT

## 2018-03-21 RX ADMIN — BARIUM SULFATE 450 ML: 21 SUSPENSION ORAL at 12:15

## 2018-03-21 RX ADMIN — IOPAMIDOL 100 ML: 612 INJECTION, SOLUTION INTRAVENOUS at 12:15

## 2018-05-07 DIAGNOSIS — F41.1 GENERALIZED ANXIETY DISORDER: ICD-10-CM

## 2018-05-07 RX ORDER — ALPRAZOLAM 0.25 MG/1
TABLET ORAL
Qty: 20 TABLET | Refills: 1 | Status: SHIPPED | OUTPATIENT
Start: 2018-05-07 | End: 2019-06-25 | Stop reason: DRUGHIGH

## 2018-05-22 ENCOUNTER — OFFICE VISIT (OUTPATIENT)
Dept: INTERNAL MEDICINE | Facility: CLINIC | Age: 43
End: 2018-05-22

## 2018-05-22 ENCOUNTER — HOSPITAL ENCOUNTER (OUTPATIENT)
Dept: GENERAL RADIOLOGY | Facility: HOSPITAL | Age: 43
Discharge: HOME | End: 2018-05-22
Attending: INTERNAL MEDICINE | Admitting: INTERNAL MEDICINE

## 2018-05-22 VITALS
WEIGHT: 136.38 LBS | SYSTOLIC BLOOD PRESSURE: 110 MMHG | TEMPERATURE: 98.2 F | BODY MASS INDEX: 22.72 KG/M2 | DIASTOLIC BLOOD PRESSURE: 70 MMHG | HEART RATE: 72 BPM | RESPIRATION RATE: 20 BRPM | HEIGHT: 65 IN

## 2018-05-22 DIAGNOSIS — M25.551 PAIN OF BOTH HIP JOINTS: ICD-10-CM

## 2018-05-22 DIAGNOSIS — H91.93 BILATERAL HEARING LOSS, UNSPECIFIED HEARING LOSS TYPE: ICD-10-CM

## 2018-05-22 DIAGNOSIS — E55.9 VITAMIN D DEFICIENCY: ICD-10-CM

## 2018-05-22 DIAGNOSIS — M25.552 PAIN OF BOTH HIP JOINTS: ICD-10-CM

## 2018-05-22 DIAGNOSIS — Z13.6 SCREENING FOR CARDIOVASCULAR CONDITION: ICD-10-CM

## 2018-05-22 DIAGNOSIS — Z00.00 ENCOUNTER FOR HEALTH MAINTENANCE EXAMINATION IN ADULT: Primary | ICD-10-CM

## 2018-05-22 LAB
25(OH)D3 SERPL-MCNC: 20 NG/ML
ARTICHOKE IGE QN: 106 MG/DL (ref 0–130)
CHOLEST SERPL-MCNC: 159 MG/DL (ref 0–200)
HDLC SERPL-MCNC: 51 MG/DL (ref 40–60)
TRIGL SERPL-MCNC: 86 MG/DL (ref 0–150)
TSH SERPL DL<=0.05 MIU/L-ACNC: 0.86 MIU/ML (ref 0.35–5.35)

## 2018-05-22 PROCEDURE — 80061 LIPID PANEL: CPT | Performed by: INTERNAL MEDICINE

## 2018-05-22 PROCEDURE — 84443 ASSAY THYROID STIM HORMONE: CPT | Performed by: INTERNAL MEDICINE

## 2018-05-22 PROCEDURE — 82306 VITAMIN D 25 HYDROXY: CPT | Performed by: INTERNAL MEDICINE

## 2018-05-22 PROCEDURE — 36415 COLL VENOUS BLD VENIPUNCTURE: CPT | Performed by: INTERNAL MEDICINE

## 2018-05-22 PROCEDURE — 99396 PREV VISIT EST AGE 40-64: CPT | Performed by: INTERNAL MEDICINE

## 2018-05-22 PROCEDURE — 73522 X-RAY EXAM HIPS BI 3-4 VIEWS: CPT

## 2018-05-22 PROCEDURE — 99212 OFFICE O/P EST SF 10 MIN: CPT | Performed by: INTERNAL MEDICINE

## 2018-05-22 NOTE — PROGRESS NOTES
Subjective     Chief Complaint:  Physical Exam.    History of Present Illness    History obtained from the patient.    The patient has a history of left Breast Cancer.  She saw Dr. Lopez on 3/19/18.  She states she had a negative CT head/chest/abdomen/pelvis.  She also had a normal CMP and CBC on 3/21/18.    The patient sees Dr. Coy for Depression, Anxiety, and Bipolar Disorder.  She is on Wellbutrin XL and Lamictal.  She received a prescription for Alprazolam, #20 from the last month for flight anxiety.  She states Dr. Coy ust called in another prescription for Alprazolam #20, due to a comment made by the patient at the pharmacy.  The patient states she did not request the Alprazolam from the pharmacist, but he was with an hearing distance when she made the comment, and was quite surprised to find that it had been called in.    The patient had failed a bilateral hearing test in our office in August 2015.  An audiology referral was placed, but that was about the time her breast cancer was diagnosed, so she did not go for the test.  She would like it re-scheduled.    Vitamin D Deficiency Follow-up: The patient is here for follow-up of Vitamin D deficiency, diagnosed 5/11/17.    Interval events: On 5/11/17, Vitamin D level was 32.8.    Symptoms: Denies fatigue, myalgias, arthralgias, and paresthesias.    Medication: Chewable Vitamin D supplement 1-2 times per week.  She is unsure of the dosage.    Maryana Love is a 42 y.o. female who presents for an Annual Physical.      PMH, PSH, SocHx, FamHx, Allergies, and Medications: Reviewed and updated.    Outpatient Medications Prior to Visit   Medication Sig Dispense Refill   • ALPRAZolam (XANAX) 0.25 MG tablet TAKE ONE TABLET BY MOUTH THREE TIMES A DAY AS NEEDED FOR ANXIETY 20 tablet 1   • buPROPion XL (WELLBUTRIN XL) 150 MG 24 hr tablet Take  by mouth 2 (Two) Times a Day.     • lamoTRIgine (LAMICTAL) 150 MG tablet Take 400 mg by mouth Daily.     • tamoxifen  (NOLVADEX) 20 MG chemo tablet TAKE ONE TABLET BY MOUTH DAILY 90 tablet 3   • topiramate (TOPAMAX) 50 MG tablet Take 1 tablet by mouth 2 (Two) Times a Day. (Patient taking differently: Take 50 mg by mouth Daily.) 60 tablet 11   • cefdinir (OMNICEF) 300 MG capsule Take 1 capsule by mouth 2 (Two) Times a Day. 14 capsule 0     No facility-administered medications prior to visit.        Immunization History   Administered Date(s) Administered   • Tdap 02/04/2014         Patient Active Problem List   Diagnosis   • Allergic rhinitis   • Anxiety disorder   • Malignant neoplasm of upper-outer quadrant of left female breast   • Chronic urinary tract infection   • Depression   • Hearing loss   • Edema   • Hemangioma of liver   • Cyst of ovary   • Vitamin D deficiency   • Bipolar affective disorder   • Tremor   • Tobacco use       Health Habits:  Dental Exam. up to date  Eye Exam. up to date  Hearing Loss:  Yes  Exercise: 0 times/week.  Current exercise activities include: none  Diet: Unhealthy, especially when stress  Multivitamin: No    Safe Driving:  Yes  Seat Belt:  Yes  Bike Helmet:  N/A  Skin Screening:  Yes  Sunscreen: Yes  SBE / VANESSA: N/A  Sexual Activity:  Yes  Birth Control:  [facility]  STD Prevention:  N/A    Last Pap: 2008, normal.  She states she had a pelvic exam done by her urologist last month and it was normal.  Last Mammogram:  8/18/15, cat 5  Last DEXA Scan: N/A  Last Colonoscopy: n/a  Last PSA: N/A    Social:    Social History     Social History   • Marital status:      Spouse name: N/A   • Number of children: 3   • Years of education: N/A     Occupational History   • Controller, full time     Social History Main Topics   • Smoking status: Former Smoker     Packs/day: 0.25     Years: 20     Types: Cigarettes     Quit date: 6/22/2013   • Smokeless tobacco: Never Used      Quit intermittently for several years at a time.  Vaping daily  since 12/17.   • Alcohol use Yes      2-3 times per month, vodka   • Drug  use: No   • Sexual activity: Defer     Other Topics Concern   • Not on file     Social History Narrative   • No narrative on file         Current Medical Providers:    Annabelle Camargo MD (Internal Medicine / Pediatrics)    The [facility] providers who are involved in the care of this patient are listed above.         Review of Systems   Constitutional: Negative for chills, fatigue, fever and unexpected weight change.        No weight gain or weight loss.  No night sweats.  No generalized pain.   HENT: Positive for congestion, hearing loss, postnasal drip, rhinorrhea (clear), sinus pain, sinus pressure and sneezing. Negative for ear pain, nosebleeds, sore throat, tinnitus and voice change.         Denies snoring.   Eyes: Positive for discharge (watery). Negative for photophobia, pain, redness, itching and visual disturbance.   Respiratory: Negative for cough, chest tightness, shortness of breath, wheezing and stridor.         No orthopnea, dyspnea on exertion, or PND.  No chest congestion.   No  hemoptysis.   Cardiovascular: Negative for chest pain, palpitations and leg swelling.        No claudication or syncope.   Gastrointestinal: Negative for abdominal pain, blood in stool, constipation, diarrhea, nausea, rectal pain and vomiting.        No hematemesis.  No heartburn, dysphagia or odynophagia.  No belching or bloating.  No melena.   Endocrine: Negative for cold intolerance, heat intolerance, polydipsia, polyphagia and polyuria.        No hair loss or dry skin.  No generalized weakness.  No hot flashes.   Genitourinary: Negative for difficulty urinating, dyspareunia, dysuria, flank pain, frequency, hematuria, pelvic pain, urgency, vaginal bleeding and vaginal discharge.        No nocturia, incomplete emptying, or incontinence.   Musculoskeletal: Negative for arthralgias, back pain, gait problem, joint swelling, myalgias, neck pain and neck stiffness.        No joint stiffness.  She is complaining of bilateral  "hip pain for 2-3 months.  She denies injury or trauma.  She takes Aleve occasionally, which helps.   Skin: Negative for rash.        No new skin lesions or changes in skin lesions. No breast pain or masses.  No nipple discharge or nipple inversion.   Neurological: Positive for tremors (sees Winkley). Negative for dizziness, syncope, speech difficulty, weakness, light-headedness, numbness and headaches.        No tingling.  No memory loss.  No decreased concentration.   Hematological: Negative for adenopathy. Does not bruise/bleed easily.   Psychiatric/Behavioral: Negative for confusion, sleep disturbance and suicidal ideas. The patient is nervous/anxious.         Stable depression.           Objective     Vitals:    05/22/18 1252   BP: 110/70   BP Location: Right arm   Pulse: 72   Resp: 20   Temp: 98.2 °F (36.8 °C)   TempSrc: Temporal Artery    Weight: 61.9 kg (136 lb 6 oz)   Height: 164.5 cm (64.75\")       Body mass index is 22.87 kg/m².    Physical Exam   Constitutional: She appears well-developed and well-nourished.   HENT:   Head: Normocephalic and atraumatic.   Right Ear: Tympanic membrane, external ear and ear canal normal.   Left Ear: Tympanic membrane, external ear and ear canal normal.   Mouth/Throat: Oropharynx is clear and moist. No oropharyngeal exudate.   Eyes: Conjunctivae and EOM are normal. Pupils are equal, round, and reactive to light.   Neck: Normal range of motion. Neck supple. No thyromegaly present.   Cardiovascular: Normal rate, regular rhythm and intact distal pulses.  Exam reveals no gallop and no friction rub.    No murmur heard.  Pulmonary/Chest: Effort normal and breath sounds normal.   Breast exam deferred (bilateral mastectomy).   Abdominal: Soft. Bowel sounds are normal. She exhibits no distension and no mass. There is no hepatomegaly. There is no tenderness. There is no rebound and no guarding.   .   Genitourinary:   Genitourinary Comments:  and rectal exam deferred. "   Musculoskeletal: Normal range of motion. She exhibits no edema or tenderness.   There is no tenderness to palpation of the bilateral hips.  There is no erythema, edema, or warmth.  There is pain with bilateral hip abduction, but not adduction.  There is no pain with bilateral hip flexion or extension.  Straight leg raise is negative bilaterally.   Lymphadenopathy:     She has no cervical adenopathy.        Right cervical: No posterior cervical adenopathy present.       Left cervical: No posterior cervical adenopathy present.     She has no axillary adenopathy.        Right: No inguinal and no supraclavicular adenopathy present.        Left: No inguinal and no supraclavicular adenopathy present.   Neurological: She is alert. She has normal strength and normal reflexes. No cranial nerve deficit. She exhibits normal muscle tone. Coordination and gait normal.   Skin: No lesion and no rash noted.   No atypical skin lesions.   Psychiatric: She has a normal mood and affect.   Nursing note and vitals reviewed.          Assessment/Plan       Maryana was seen today for annual exam.    Diagnoses and all orders for this visit:    Encounter for health maintenance examination in adult  -     TSH  -     Vitamin D 25 Hydroxy  -     Lipid Panel    Vitamin D deficiency  -     Vitamin D 25 Hydroxy    Pain of both hip joints  -     XR Hips Bilateral With or Without Pelvis 3-4 View; Future    Screening for cardiovascular condition  -     TSH  -     Vitamin D 25 Hydroxy  -     Lipid Panel    Bilateral hearing loss, unspecified hearing loss type  -     Ambulatory Referral to Audiology          Return in about 1 year (around 5/22/2019) for Annual physical, fasting.

## 2018-05-22 NOTE — PATIENT INSTRUCTIONS
Health Maintenance, Female  Adopting a healthy lifestyle and getting preventive care can go a long way to promote health and wellness. Talk with your health care provider about what schedule of regular examinations is right for you. This is a good chance for you to check in with your provider about disease prevention and staying healthy.  In between checkups, there are plenty of things you can do on your own. Experts have done a lot of research about which lifestyle changes and preventive measures are most likely to keep you healthy. Ask your health care provider for more information.  Weight and diet  Eat a healthy diet  · Be sure to include plenty of vegetables, fruits, low-fat dairy products, and lean protein.  · Do not eat a lot of foods high in solid fats, added sugars, or salt.  · Get regular exercise. This is one of the most important things you can do for your health.  ¨ Most adults should exercise for at least 150 minutes each week. The exercise should increase your heart rate and make you sweat (moderate-intensity exercise).  ¨ Most adults should also do strengthening exercises at least twice a week. This is in addition to the moderate-intensity exercise.  Maintain a healthy weight  · Body mass index (BMI) is a measurement that can be used to identify possible weight problems. It estimates body fat based on height and weight. Your health care provider can help determine your BMI and help you achieve or maintain a healthy weight.  · For females 20 years of age and older:  ¨ A BMI below 18.5 is considered underweight.  ¨ A BMI of 18.5 to 24.9 is normal.  ¨ A BMI of 25 to 29.9 is considered overweight.  ¨ A BMI of 30 and above is considered obese.  Watch levels of cholesterol and blood lipids  · You should start having your blood tested for lipids and cholesterol at 20 years of age, then have this test every 5 years.  · You may need to have your cholesterol levels checked more often if:  ¨ Your lipid or  cholesterol levels are high.  ¨ You are older than 50 years of age.  ¨ You are at high risk for heart disease.  Cancer screening  Lung Cancer  · Lung cancer screening is recommended for adults 55-80 years old who are at high risk for lung cancer because of a history of smoking.  · A yearly low-dose CT scan of the lungs is recommended for people who:  ¨ Currently smoke.  ¨ Have quit within the past 15 years.  ¨ Have at least a 30-pack-year history of smoking. A pack year is smoking an average of one pack of cigarettes a day for 1 year.  · Yearly screening should continue until it has been 15 years since you quit.  · Yearly screening should stop if you develop a health problem that would prevent you from having lung cancer treatment.  Breast Cancer  · Practice breast self-awareness. This means understanding how your breasts normally appear and feel.  · It also means doing regular breast self-exams. Let your health care provider know about any changes, no matter how small.  · If you are in your 20s or 30s, you should have a clinical breast exam (CBE) by a health care provider every 1-3 years as part of a regular health exam.  · If you are 40 or older, have a CBE every year. Also consider having a breast X-ray (mammogram) every year.  · If you have a family history of breast cancer, talk to your health care provider about genetic screening.  · If you are at high risk for breast cancer, talk to your health care provider about having an MRI and a mammogram every year.  · Breast cancer gene (BRCA) assessment is recommended for women who have family members with BRCA-related cancers. BRCA-related cancers include:  ¨ Breast.  ¨ Ovarian.  ¨ Tubal.  ¨ Peritoneal cancers.  · Results of the assessment will determine the need for genetic counseling and BRCA1 and BRCA2 testing.  Cervical Cancer   Your health care provider may recommend that you be screened regularly for cancer of the pelvic organs (ovaries, uterus, and vagina).  This screening involves a pelvic examination, including checking for microscopic changes to the surface of your cervix (Pap test). You may be encouraged to have this screening done every 3 years, beginning at age 21.  · For women ages 30-65, health care providers may recommend pelvic exams and Pap testing every 3 years, or they may recommend the Pap and pelvic exam, combined with testing for human papilloma virus (HPV), every 5 years. Some types of HPV increase your risk of cervical cancer. Testing for HPV may also be done on women of any age with unclear Pap test results.  · Other health care providers may not recommend any screening for nonpregnant women who are considered low risk for pelvic cancer and who do not have symptoms. Ask your health care provider if a screening pelvic exam is right for you.  · If you have had past treatment for cervical cancer or a condition that could lead to cancer, you need Pap tests and screening for cancer for at least 20 years after your treatment. If Pap tests have been discontinued, your risk factors (such as having a new sexual partner) need to be reassessed to determine if screening should resume. Some women have medical problems that increase the chance of getting cervical cancer. In these cases, your health care provider may recommend more frequent screening and Pap tests.  Colorectal Cancer  · This type of cancer can be detected and often prevented.  · Routine colorectal cancer screening usually begins at 50 years of age and continues through 75 years of age.  · Your health care provider may recommend screening at an earlier age if you have risk factors for colon cancer.  · Your health care provider may also recommend using home test kits to check for hidden blood in the stool.  · A small camera at the end of a tube can be used to examine your colon directly (sigmoidoscopy or colonoscopy). This is done to check for the earliest forms of colorectal cancer.  · Routine  screening usually begins at age 50.  · Direct examination of the colon should be repeated every 5-10 years through 75 years of age. However, you may need to be screened more often if early forms of precancerous polyps or small growths are found.  Skin Cancer  · Check your skin from head to toe regularly.  · Tell your health care provider about any new moles or changes in moles, especially if there is a change in a mole's shape or color.  · Also tell your health care provider if you have a mole that is larger than the size of a pencil eraser.  · Always use sunscreen. Apply sunscreen liberally and repeatedly throughout the day.  · Protect yourself by wearing long sleeves, pants, a wide-brimmed hat, and sunglasses whenever you are outside.  Heart disease, diabetes, and high blood pressure  · High blood pressure causes heart disease and increases the risk of stroke. High blood pressure is more likely to develop in:  ¨ People who have blood pressure in the high end of the normal range (130-139/85-89 mm Hg).  ¨ People who are overweight or obese.  ¨ People who are .  · If you are 18-39 years of age, have your blood pressure checked every 3-5 years. If you are 40 years of age or older, have your blood pressure checked every year. You should have your blood pressure measured twice--once when you are at a hospital or clinic, and once when you are not at a hospital or clinic. Record the average of the two measurements. To check your blood pressure when you are not at a hospital or clinic, you can use:  ¨ An automated blood pressure machine at a pharmacy.  ¨ A home blood pressure monitor.  · If you are between 55 years and 79 years old, ask your health care provider if you should take aspirin to prevent strokes.  · Have regular diabetes screenings. This involves taking a blood sample to check your fasting blood sugar level.  ¨ If you are at a normal weight and have a low risk for diabetes, have this test once  every three years after 45 years of age.  ¨ If you are overweight and have a high risk for diabetes, consider being tested at a younger age or more often.  Preventing infection  Hepatitis B  · If you have a higher risk for hepatitis B, you should be screened for this virus. You are considered at high risk for hepatitis B if:  ¨ You were born in a country where hepatitis B is common. Ask your health care provider which countries are considered high risk.  ¨ Your parents were born in a high-risk country, and you have not been immunized against hepatitis B (hepatitis B vaccine).  ¨ You have HIV or AIDS.  ¨ You use needles to inject street drugs.  ¨ You live with someone who has hepatitis B.  ¨ You have had sex with someone who has hepatitis B.  ¨ You get hemodialysis treatment.  ¨ You take certain medicines for conditions, including cancer, organ transplantation, and autoimmune conditions.  Hepatitis C  · Blood testing is recommended for:  ¨ Everyone born from 1945 through 1965.  ¨ Anyone with known risk factors for hepatitis C.  Sexually transmitted infections (STIs)  · You should be screened for sexually transmitted infections (STIs) including gonorrhea and chlamydia if:  ¨ You are sexually active and are younger than 24 years of age.  ¨ You are older than 24 years of age and your health care provider tells you that you are at risk for this type of infection.  ¨ Your sexual activity has changed since you were last screened and you are at an increased risk for chlamydia or gonorrhea. Ask your health care provider if you are at risk.  · If you do not have HIV, but are at risk, it may be recommended that you take a prescription medicine daily to prevent HIV infection. This is called pre-exposure prophylaxis (PrEP). You are considered at risk if:  ¨ You are sexually active and do not regularly use condoms or know the HIV status of your partner(s).  ¨ You take drugs by injection.  ¨ You are sexually active with a partner  who has HIV.  Talk with your health care provider about whether you are at high risk of being infected with HIV. If you choose to begin PrEP, you should first be tested for HIV. You should then be tested every 3 months for as long as you are taking PrEP.  Pregnancy  · If you are premenopausal and you may become pregnant, ask your health care provider about preconception counseling.  · If you may become pregnant, take 400 to 800 micrograms (mcg) of folic acid every day.  · If you want to prevent pregnancy, talk to your health care provider about birth control (contraception).  Osteoporosis and menopause  · Osteoporosis is a disease in which the bones lose minerals and strength with aging. This can result in serious bone fractures. Your risk for osteoporosis can be identified using a bone density scan.  · If you are 65 years of age or older, or if you are at risk for osteoporosis and fractures, ask your health care provider if you should be screened.  · Ask your health care provider whether you should take a calcium or vitamin D supplement to lower your risk for osteoporosis.  · Menopause may have certain physical symptoms and risks.  · Hormone replacement therapy may reduce some of these symptoms and risks.  Talk to your health care provider about whether hormone replacement therapy is right for you.  Follow these instructions at home:  · Schedule regular health, dental, and eye exams.  · Stay current with your immunizations.  · Do not use any tobacco products including cigarettes, chewing tobacco, or electronic cigarettes.  · If you are pregnant, do not drink alcohol.  · If you are breastfeeding, limit how much and how often you drink alcohol.  · Limit alcohol intake to no more than 1 drink per day for nonpregnant women. One drink equals 12 ounces of beer, 5 ounces of wine, or 1½ ounces of hard liquor.  · Do not use street drugs.  · Do not share needles.  · Ask your health care provider for help if you need support  or information about quitting drugs.  · Tell your health care provider if you often feel depressed.  · Tell your health care provider if you have ever been abused or do not feel safe at home.  This information is not intended to replace advice given to you by your health care provider. Make sure you discuss any questions you have with your health care provider.  Document Released: 07/02/2012 Document Revised: 05/25/2017 Document Reviewed: 09/20/2016  [employer] Patient Education © 2017 [employer]

## 2018-07-18 ENCOUNTER — TELEPHONE (OUTPATIENT)
Dept: INTERNAL MEDICINE | Facility: CLINIC | Age: 43
End: 2018-07-18

## 2018-07-18 DIAGNOSIS — T75.3XXA MOTION SICKNESS, INITIAL ENCOUNTER: Primary | ICD-10-CM

## 2018-07-18 RX ORDER — SCOLOPAMINE TRANSDERMAL SYSTEM 1 MG/1
1 PATCH, EXTENDED RELEASE TRANSDERMAL
Qty: 3 PATCH | Refills: 0 | Status: SHIPPED | OUTPATIENT
Start: 2018-07-18 | End: 2018-07-28

## 2018-07-18 NOTE — TELEPHONE ENCOUNTER
----- Message from Aracely Vera sent at 7/17/2018  3:03 PM EDT -----  Contact: SELF  RAYMOND IVONNE CALLING FOR A REFILL FOR THE SEA SICKNESS PATCHES, SHE IS GOING ON A CRUISE ON 7/28/18. SHE USES THE [facility] AT [facility]. RAYMOND CAN BE REACHED [phone]

## 2018-07-18 NOTE — TELEPHONE ENCOUNTER
Spoke with Maryana she is going on the cruise for 7 days   Notified her Dr Camargo would write Rx and to check with [facility] pharmacy later today     Verb understanding given

## 2018-10-08 ENCOUNTER — OFFICE VISIT (OUTPATIENT)
Dept: ONCOLOGY | Facility: CLINIC | Age: 43
End: 2018-10-08

## 2018-10-08 VITALS
SYSTOLIC BLOOD PRESSURE: 96 MMHG | RESPIRATION RATE: 16 BRPM | OXYGEN SATURATION: 97 % | WEIGHT: 139 LBS | HEART RATE: 75 BPM | BODY MASS INDEX: 23.16 KG/M2 | HEIGHT: 65 IN | TEMPERATURE: 97.8 F | DIASTOLIC BLOOD PRESSURE: 58 MMHG

## 2018-10-08 DIAGNOSIS — Z17.0 MALIGNANT NEOPLASM OF UPPER-OUTER QUADRANT OF LEFT BREAST IN FEMALE, ESTROGEN RECEPTOR POSITIVE (HCC): Primary | ICD-10-CM

## 2018-10-08 DIAGNOSIS — C50.412 MALIGNANT NEOPLASM OF UPPER-OUTER QUADRANT OF LEFT BREAST IN FEMALE, ESTROGEN RECEPTOR POSITIVE (HCC): Primary | ICD-10-CM

## 2018-10-08 PROCEDURE — 99213 OFFICE O/P EST LOW 20 MIN: CPT | Performed by: INTERNAL MEDICINE

## 2018-10-09 NOTE — PROGRESS NOTES
"PROBLEM LIST:  Oncology/Hematology History    1. Stage IIA, ER/NE positive, HER-2 negative left breast cancer measuring  about 2.3 cm in largest dimension. Dx: 8/18/2015   2. Oncotype DX score of 17 placing her at low risk for recurrence.   3. Currently on tamoxifen initiated in 11/2015.  4. Genetic testing was negative with the Breast[employer] panel.         Malignant neoplasm of upper-outer quadrant of left female breast (CMS/HCC)    8/18/2015 Initial Diagnosis     Malignant neoplasm of upper-outer quadrant of left female breast          REASON FOR VISIT: Stage II breast cancer with axillary adenopathy    HISTORY OF PRESENT ILLNESS:   43-year-old lady with stage II breast cancer presents today for follow-up.  She is currently 3 years out from her diagnosis. She is currently on tamoxifen and tolerating it well.  No significant issues ongoing.  Denies any pain.    Past medical history, social history and family history was reviewed and unchanged from prior visit.    Review of Systems:    Review of Systems   Constitutional: Negative.    HENT:  Negative.    Eyes: Negative.    Respiratory: Negative.    Cardiovascular: Negative.    Gastrointestinal: Negative.    Endocrine: Negative.    Genitourinary: Negative.     Musculoskeletal: Negative.    Skin: Negative.    Neurological: Negative.    Hematological: Negative.    Psychiatric/Behavioral: Negative.       A comprehensive 14 point review of systems was performed and was negative except as mentioned.      Medications:  The current medication list was reviewed in the EMR    ALLERGIES:    Allergies   Allergen Reactions   • Macrobid [Nitrofurantoin Monohyd Macro]    • Macrodantin [Nitrofurantoin Macrocrystal]    • Nitrofurantoin Hives   • Percocet [Oxycodone-Acetaminophen] Hives         Physical Exam    VITAL SIGNS:  BP 96/58 Comment: RUE  Pulse 75   Temp 97.8 °F (36.6 °C) (Oral)   Resp 16   Ht 164.5 cm (64.75\")   Wt 63 kg (139 lb)   LMP  (LMP Unknown) Comment: LAST " MAMOGRAM 8/2015  SpO2 97% Comment: RA  BMI 23.31 kg/m²      Performance Status: 0    General: well appearing, in no acute distress  HEENT: sclera anicteric, oropharynx clear, neck is supple  Lymphatics:subtle small lymph node on the left  Cardiovascular: regular rate and rhythm, no murmurs, rubs or gallops  Lungs: clear to auscultation bilaterally  Abdomen: soft, nontender, nondistended.  No palpable organomegaly  Extremities: no lower extremity edema  Skin: no rashes, lesions, bruising, or petechiae  Msk:  Shows no weakness of the large muscle groups  Psych: pressured speech         RECENT LABS:    Lab Results   Component Value Date    WBC 6.54 03/21/2018    HGB 14.2 03/21/2018    HCT 43.7 03/21/2018    MCV 98.0 03/21/2018     03/21/2018      Lab Results   Component Value Date    GLUCOSE 78 03/21/2018    BUN 15 03/21/2018    CREATININE 0.80 03/21/2018    EGFRIFNONA 79 03/21/2018    BCR 18.8 03/21/2018    CO2 24.0 03/21/2018    CALCIUM 9.4 03/21/2018    ALBUMIN 4.60 03/21/2018    AST 16 03/21/2018    ALT 13 03/21/2018             Assessment/Plan    1. Stage II a ER/WI positive low grade left upper quadrant breast cancer.   She is now 3 years out.  Doing reasonably well.  Continue tamoxifen for 5 years.    2.  Bipolar disorder: Better controlled for now.        I spent 15 minutes on the patient's plan and care with more than 50% of the time spent counseling the patient.    Coleen Bautista MD  [facility]    10/8/2018         Please note that portions of this note may have been completed with a voice recognition program. Efforts were made to edit the dictations, but occasionally words are mistranscribed.

## 2019-03-14 ENCOUNTER — OFFICE VISIT (OUTPATIENT)
Dept: INTERNAL MEDICINE | Facility: CLINIC | Age: 44
End: 2019-03-14

## 2019-03-14 VITALS
BODY MASS INDEX: 24.32 KG/M2 | TEMPERATURE: 99 F | SYSTOLIC BLOOD PRESSURE: 102 MMHG | RESPIRATION RATE: 22 BRPM | DIASTOLIC BLOOD PRESSURE: 60 MMHG | WEIGHT: 145 LBS | HEART RATE: 72 BPM

## 2019-03-14 DIAGNOSIS — K59.04 CHRONIC IDIOPATHIC CONSTIPATION: ICD-10-CM

## 2019-03-14 DIAGNOSIS — R10.33 PERIUMBILICAL ABDOMINAL PAIN: Primary | ICD-10-CM

## 2019-03-14 PROCEDURE — 99214 OFFICE O/P EST MOD 30 MIN: CPT | Performed by: INTERNAL MEDICINE

## 2019-03-14 NOTE — PROGRESS NOTES
Subjective       Maryana Love is a 43 y.o. female.     Chief Complaint   Patient presents with   • Abdominal Pain   • Constipation       History obtained from the patient.    Of note, the patient self discontinued Xanax, Wellbutrin, Lamictal, Topamax, and Tamoxifen approximately 1-1/2 months ago.      Abdominal Pain   This is a new problem. Episode onset: 1 month ago. The onset quality is gradual. The problem occurs constantly. The problem has been gradually improving. The pain is located in the LLQ. The pain is mild. The quality of the pain is aching. The abdominal pain does not radiate. Associated symptoms include arthralgias (knees), constipation, diarrhea (x 1 episode 2 nights ago, resolved), flatus, hematochezia (on the stool), nausea and vomiting. Pertinent negatives include no belching, dysuria, fever, frequency, headaches, hematuria, melena, myalgias or weight loss. The pain is aggravated by eating. The pain is relieved by certain positions. Treatments tried: Miralax 1 capful x 2 nights. The treatment provided no relief (and caused loose stools). Her past medical history is significant for abdominal surgery. There is no history of GERD.   Constipation   This is a new problem. Episode onset: 1 month ago. The problem has been gradually improving since onset. Her stool frequency is 2 to 3 times per week. The stool is described as blood coated. The patient is on a high fiber diet. She does not exercise regularly. There has been adequate water intake. Associated symptoms include abdominal pain, bloating, diarrhea (x 1 episode 2 nights ago, resolved), fecal incontinence, flatus, hematochezia (on the stool), nausea and vomiting. Pertinent negatives include no fever, hemorrhoids, melena, rectal pain or weight loss. Risk factors include dietary change. Treatments tried: Miralax 1 capful x 2 days. The treatment provided no relief (made stools loose). Her past medical history is significant for abdominal surgery.         The following portions of the patient's history were reviewed and updated as appropriate: allergies, current medications, past family history, past medical history, past social history, past surgical history and problem list.      Review of Systems   Constitutional: Negative for appetite change, chills, fever, unexpected weight change and weight loss.   HENT: Negative for sore throat and trouble swallowing.    Respiratory: Negative for choking, shortness of breath and wheezing.    Cardiovascular: Negative for chest pain.   Gastrointestinal: Positive for abdominal pain, bloating, blood in stool, constipation, diarrhea (x 1 episode 2 nights ago, resolved), flatus, hematochezia (on the stool), nausea and vomiting. Negative for hemorrhoids, melena and rectal pain.   Genitourinary: Negative for dysuria, frequency and hematuria.   Musculoskeletal: Positive for arthralgias (knees). Negative for joint swelling and myalgias.   Skin: Negative for rash.   Neurological: Negative for headaches.   Hematological: Negative for adenopathy.           Objective     Blood pressure 102/60, pulse 72, temperature 99 °F (37.2 °C), temperature source Temporal, resp. rate 22, weight 65.8 kg (145 lb), not currently breastfeeding.    Physical Exam   Constitutional: She appears well-developed and well-nourished.   HENT:   Mouth/Throat: Oropharynx is clear and moist. No oropharyngeal exudate or posterior oropharyngeal erythema.   Cardiovascular: Normal rate, regular rhythm and normal heart sounds.   No murmur heard.  Pulmonary/Chest: Effort normal and breath sounds normal.   Abdominal: Soft. Bowel sounds are normal. She exhibits no distension and no mass. There is no hepatosplenomegaly. There is tenderness (periumbilical). There is no rebound, no guarding and no CVA tenderness.   Lymphadenopathy:     She has no cervical adenopathy.        Right: No inguinal and no supraclavicular adenopathy present.        Left: No inguinal and no  supraclavicular adenopathy present.   Skin: No rash noted.   Psychiatric: She has a normal mood and affect.   Nursing note and vitals reviewed.        Assessment/Plan   Maryana was seen today for abdominal pain and constipation.    Diagnoses and all orders for this visit:    Periumbilical abdominal pain    Chronic idiopathic constipation  -     linaclotide (LINZESS) 145 MCG capsule capsule; Take 1 capsule by mouth Every Morning Before Breakfast.    Recommend a daily Probiotic and daily Magnesium supplement.      Return in about 3 months (around 6/14/2019) for Annual physical, fasting.

## 2019-03-18 ENCOUNTER — TELEPHONE (OUTPATIENT)
Dept: INTERNAL MEDICINE | Facility: CLINIC | Age: 44
End: 2019-03-18

## 2019-03-18 DIAGNOSIS — K59.04 CHRONIC IDIOPATHIC CONSTIPATION: ICD-10-CM

## 2019-03-18 NOTE — TELEPHONE ENCOUNTER
----- Message from Jemima Washington sent at 3/18/2019 12:10 PM EDT -----  PT CALLED AND STATES THAT THE linaclotide (LINZESS) 145 MCG capsule capsule IS NOT HELPING.    PH[facility]ACY: [facility] - [phone] [facility] [phone]     PATIENT CONTACT: [phone]    THANK YOU.

## 2019-03-27 ENCOUNTER — TELEPHONE (OUTPATIENT)
Dept: INTERNAL MEDICINE | Facility: CLINIC | Age: 44
End: 2019-03-27

## 2019-03-27 DIAGNOSIS — C50.412 MALIGNANT NEOPLASM OF UPPER-OUTER QUADRANT OF LEFT BREAST IN FEMALE, ESTROGEN RECEPTOR POSITIVE (HCC): Primary | ICD-10-CM

## 2019-03-27 DIAGNOSIS — K59.04 CHRONIC IDIOPATHIC CONSTIPATION: ICD-10-CM

## 2019-03-27 DIAGNOSIS — R10.33 PERIUMBILICAL ABDOMINAL PAIN: ICD-10-CM

## 2019-03-27 DIAGNOSIS — Z17.0 MALIGNANT NEOPLASM OF UPPER-OUTER QUADRANT OF LEFT BREAST IN FEMALE, ESTROGEN RECEPTOR POSITIVE (HCC): Primary | ICD-10-CM

## 2019-03-27 DIAGNOSIS — N64.59 BREAST SYMPTOM: ICD-10-CM

## 2019-03-27 NOTE — TELEPHONE ENCOUNTER
----- Message from Briana Bass sent at 3/27/2019 11:20 AM EDT -----  PATIENT CALLED AND STATED THAT lamoTRIgine (LAMICTAL) 150 MG tablet IS NOT WORKING. ALSO PATIENT STATED THAT HER RIGHT BREAST IS ITCHING LIKE HER LEFT BREAST DID WHEN SHE HAD BREAST CANCER IN THE PAST. PATIENT CAN BE REACHED [phone].

## 2019-03-27 NOTE — TELEPHONE ENCOUNTER
At her last visit, the patient told me she had stopped her Lamictal.  Is she talking about Linzess?    If she is having any breast symptoms at all, including itching, I would recommend scheduling a mammogram.  If she is agreeable, I will enter an order.

## 2019-03-27 NOTE — TELEPHONE ENCOUNTER
She is calling about the linzess not working for her  . She has only had 3 BM since she saw Dr Camargo on 3/14/2019  And she doubled the dose of Linzess to 290mg and only has had 2 small loose BM's   She is wanting to have a Colonoscopy,.  She stastes her stomach is hard .    She also wants to have a mammogram and have Dr Garcia read the mammogram

## 2019-03-28 RX ORDER — SODIUM, POTASSIUM,MAG SULFATES 17.5-3.13G
1 SOLUTION, RECONSTITUTED, ORAL ORAL TAKE AS DIRECTED
Qty: 2 BOTTLE | Refills: 0 | Status: SHIPPED | OUTPATIENT
Start: 2019-03-28 | End: 2019-04-18

## 2019-04-01 ENCOUNTER — TELEPHONE (OUTPATIENT)
Dept: INTERNAL MEDICINE | Facility: CLINIC | Age: 44
End: 2019-04-01

## 2019-04-01 DIAGNOSIS — K59.04 CHRONIC IDIOPATHIC CONSTIPATION: ICD-10-CM

## 2019-04-01 NOTE — TELEPHONE ENCOUNTER
----- Message from Kezia Lim sent at 4/1/2019 12:58 PM EDT -----  [facility] CALLED AND STATES THEY NEED AN UPDATED SCRIPT FOR linaclotide (LINZESS) 145 MCG capsule capsule SENT TO [facility] AT [facility].

## 2019-04-02 ENCOUNTER — OUTSIDE FACILITY SERVICE (OUTPATIENT)
Dept: GASTROENTEROLOGY | Facility: CLINIC | Age: 44
End: 2019-04-02

## 2019-04-02 ENCOUNTER — LAB REQUISITION (OUTPATIENT)
Dept: LAB | Facility: HOSPITAL | Age: 44
End: 2019-04-02

## 2019-04-02 DIAGNOSIS — K92.1 MELENA: ICD-10-CM

## 2019-04-02 DIAGNOSIS — K64.8 OTHER HEMORRHOIDS: ICD-10-CM

## 2019-04-02 DIAGNOSIS — D12.2 BENIGN NEOPLASM OF ASCENDING COLON: ICD-10-CM

## 2019-04-02 DIAGNOSIS — R10.84 GENERALIZED ABDOMINAL PAIN: ICD-10-CM

## 2019-04-02 DIAGNOSIS — K59.00 CONSTIPATION: ICD-10-CM

## 2019-04-02 PROCEDURE — 88305 TISSUE EXAM BY PATHOLOGIST: CPT | Performed by: INTERNAL MEDICINE

## 2019-04-02 PROCEDURE — 45380 COLONOSCOPY AND BIOPSY: CPT | Performed by: INTERNAL MEDICINE

## 2019-04-03 LAB
CYTO UR: NORMAL
LAB AP CASE REPORT: NORMAL
LAB AP CLINICAL INFORMATION: NORMAL
PATH REPORT.FINAL DX SPEC: NORMAL
PATH REPORT.GROSS SPEC: NORMAL

## 2019-04-05 ENCOUNTER — TELEPHONE (OUTPATIENT)
Dept: GASTROENTEROLOGY | Facility: CLINIC | Age: 44
End: 2019-04-05

## 2019-04-05 NOTE — TELEPHONE ENCOUNTER
I called and left a message regarding the pathology.  She did have one adenoma type polyp.  The patient will need a repeat examination in 5 years.

## 2019-04-09 PROBLEM — K64.8 INTERNAL HEMORRHOIDS: Status: ACTIVE | Noted: 2019-04-09

## 2019-04-09 PROBLEM — K63.5 COLON POLYPS: Status: ACTIVE | Noted: 2019-04-09

## 2019-04-18 ENCOUNTER — OFFICE VISIT (OUTPATIENT)
Dept: INTERNAL MEDICINE | Facility: CLINIC | Age: 44
End: 2019-04-18

## 2019-04-18 VITALS
HEART RATE: 72 BPM | TEMPERATURE: 98.8 F | RESPIRATION RATE: 20 BRPM | WEIGHT: 151 LBS | BODY MASS INDEX: 25.32 KG/M2 | DIASTOLIC BLOOD PRESSURE: 72 MMHG | SYSTOLIC BLOOD PRESSURE: 110 MMHG

## 2019-04-18 DIAGNOSIS — J01.90 ACUTE SINUSITIS, RECURRENCE NOT SPECIFIED, UNSPECIFIED LOCATION: Primary | ICD-10-CM

## 2019-04-18 PROCEDURE — 99214 OFFICE O/P EST MOD 30 MIN: CPT | Performed by: INTERNAL MEDICINE

## 2019-04-18 RX ORDER — CEFDINIR 300 MG/1
300 CAPSULE ORAL 2 TIMES DAILY
Qty: 20 CAPSULE | Refills: 0 | Status: SHIPPED | OUTPATIENT
Start: 2019-04-18 | End: 2019-04-28

## 2019-04-18 RX ORDER — BROMPHENIRAMINE MALEATE, PSEUDOEPHEDRINE HYDROCHLORIDE, AND DEXTROMETHORPHAN HYDROBROMIDE 2; 30; 10 MG/5ML; MG/5ML; MG/5ML
5 SYRUP ORAL 4 TIMES DAILY PRN
Qty: 150 ML | Refills: 1 | Status: SHIPPED | OUTPATIENT
Start: 2019-04-18 | End: 2019-06-25

## 2019-04-18 NOTE — PROGRESS NOTES
Subjective       Maryana Love is a 43 y.o. female.     Chief Complaint   Patient presents with   • Sinusitis     x 3 weeks        History obtained from the patient.      URI    This is a new problem. Episode onset: 3-4 weeks ago. The problem has been gradually worsening. There has been no fever. Associated symptoms include congestion, coughing (wet and dry, occasionally productive, no hemoptysis), headaches, joint pain (not new), rhinorrhea (thick), sinus pain and a sore throat. Pertinent negatives include no abdominal pain, chest pain, diarrhea, ear pain, joint swelling, nausea, rash, sneezing, swollen glands, vomiting or wheezing. Treatments tried: Dayquil. The treatment provided mild relief.        The following portions of the patient's history were reviewed and updated as appropriate: allergies, current medications, past family history, past medical history, past social history, past surgical history and problem list.      Review of Systems   Constitutional: Negative for chills and fever.   HENT: Positive for congestion, postnasal drip, rhinorrhea (thick), sinus pressure, sinus pain, sore throat and voice change (hoarse). Negative for ear pain and sneezing.    Eyes: Positive for pain (pressure). Negative for discharge, redness and itching.   Respiratory: Positive for cough (wet and dry, occasionally productive, no hemoptysis). Negative for shortness of breath and wheezing.    Cardiovascular: Negative for chest pain.   Gastrointestinal: Negative for abdominal pain, diarrhea, nausea and vomiting.   Musculoskeletal: Positive for arthralgias and joint pain (not new). Negative for joint swelling and myalgias.   Skin: Negative for rash.   Neurological: Positive for headaches.   Hematological: Negative for adenopathy.           Objective     Blood pressure 110/72, pulse 72, temperature 98.8 °F (37.1 °C), temperature source Temporal, resp. rate 20, weight 68.5 kg (151 lb), not currently breastfeeding.    Physical Exam    Constitutional: She appears well-developed and well-nourished.   HENT:   Head: Normocephalic and atraumatic.   Right Ear: Tympanic membrane, external ear and ear canal normal.   Left Ear: Tympanic membrane, external ear and ear canal normal.   Mouth/Throat: Oropharynx is clear and moist and mucous membranes are normal. No oral lesions.   Tonsils not visible.  There is bilateral frontal, but not maxillary, sinus tenderness to palpation.   Eyes: Conjunctivae are normal.   Neck: Normal range of motion. Neck supple.   Cardiovascular: Normal rate and regular rhythm.   No murmur heard.  Pulmonary/Chest: Effort normal and breath sounds normal.   Lymphadenopathy:     She has no cervical adenopathy.   Skin: No rash noted.   Psychiatric: She has a normal mood and affect.   Nursing note and vitals reviewed.        Assessment/Plan   Maryana was seen today for sinusitis.    Diagnoses and all orders for this visit:    Acute sinusitis, recurrence not specified, unspecified location  -     cefdinir (OMNICEF) 300 MG capsule; Take 1 capsule by mouth 2 (Two) Times a Day for 10 days.  -     brompheniramine-pseudoephedrine-DM 30-2-10 MG/5ML syrup; Take 5 mL by mouth 4 (Four) Times a Day As Needed for Allergies.    Discontinue Nyquil.      Return if symptoms worsen or fail to improve.

## 2019-05-30 ENCOUNTER — TELEPHONE (OUTPATIENT)
Dept: INTERNAL MEDICINE | Facility: CLINIC | Age: 44
End: 2019-05-30

## 2019-05-30 NOTE — TELEPHONE ENCOUNTER
----- Message from Elizabeth Gonsales sent at 5/29/2019  3:15 PM EDT -----  5-29-19  Patient still has a mammogram in pending from March, she was scheduled for one on 4-18-19 and was a NO SHOW.  Then a reminder letter was sent on 5-7-19 still no appointment has been made do you want to d/c order?  ----- Message -----  From: SYSTEM  Sent: 5/26/2019  12:13 AM  To: Dejon Dill [facility]

## 2019-05-30 NOTE — TELEPHONE ENCOUNTER
Spoke with Maryana .  She forgot about the appt in April/2019  .  She is going to call and reschedule mammogram.   Phone # given to reschedule

## 2019-06-24 ENCOUNTER — TELEPHONE (OUTPATIENT)
Dept: INTERNAL MEDICINE | Facility: CLINIC | Age: 44
End: 2019-06-24

## 2019-06-24 ENCOUNTER — OFFICE VISIT (OUTPATIENT)
Dept: INTERNAL MEDICINE | Facility: CLINIC | Age: 44
End: 2019-06-24

## 2019-06-24 VITALS
TEMPERATURE: 98.8 F | BODY MASS INDEX: 25.55 KG/M2 | HEART RATE: 74 BPM | WEIGHT: 153.38 LBS | HEIGHT: 65 IN | SYSTOLIC BLOOD PRESSURE: 104 MMHG | RESPIRATION RATE: 20 BRPM | DIASTOLIC BLOOD PRESSURE: 64 MMHG

## 2019-06-24 DIAGNOSIS — F41.1 GENERALIZED ANXIETY DISORDER: ICD-10-CM

## 2019-06-24 DIAGNOSIS — Z17.0 MALIGNANT NEOPLASM OF UPPER-OUTER QUADRANT OF LEFT BREAST IN FEMALE, ESTROGEN RECEPTOR POSITIVE (HCC): ICD-10-CM

## 2019-06-24 DIAGNOSIS — F32.89 OTHER DEPRESSION: ICD-10-CM

## 2019-06-24 DIAGNOSIS — M25.562 ACUTE PAIN OF BOTH KNEES: ICD-10-CM

## 2019-06-24 DIAGNOSIS — R20.2 PARESTHESIAS: ICD-10-CM

## 2019-06-24 DIAGNOSIS — Z13.6 SCREENING FOR CARDIOVASCULAR CONDITION: ICD-10-CM

## 2019-06-24 DIAGNOSIS — E55.9 VITAMIN D DEFICIENCY: ICD-10-CM

## 2019-06-24 DIAGNOSIS — Z00.00 ENCOUNTER FOR HEALTH MAINTENANCE EXAMINATION IN ADULT: Primary | ICD-10-CM

## 2019-06-24 DIAGNOSIS — H61.22 IMPACTED CERUMEN OF LEFT EAR: ICD-10-CM

## 2019-06-24 DIAGNOSIS — C50.412 MALIGNANT NEOPLASM OF UPPER-OUTER QUADRANT OF LEFT BREAST IN FEMALE, ESTROGEN RECEPTOR POSITIVE (HCC): ICD-10-CM

## 2019-06-24 DIAGNOSIS — K63.5 POLYP OF COLON, UNSPECIFIED PART OF COLON, UNSPECIFIED TYPE: ICD-10-CM

## 2019-06-24 DIAGNOSIS — M25.561 ACUTE PAIN OF BOTH KNEES: ICD-10-CM

## 2019-06-24 DIAGNOSIS — M25.50 PAIN IN JOINT INVOLVING MULTIPLE SITES: ICD-10-CM

## 2019-06-24 LAB
25(OH)D3 SERPL-MCNC: 26.2 NG/ML (ref 30–100)
ALBUMIN SERPL-MCNC: 4.2 G/DL (ref 3.5–5.2)
ALBUMIN/GLOB SERPL: 1.5 G/DL
ALP SERPL-CCNC: 67 U/L (ref 39–117)
ALT SERPL W P-5'-P-CCNC: 13 U/L (ref 1–33)
ANION GAP SERPL CALCULATED.3IONS-SCNC: 9.9 MMOL/L
AST SERPL-CCNC: 15 U/L (ref 1–32)
BASOPHILS # BLD AUTO: 0.08 10*3/MM3 (ref 0–0.2)
BASOPHILS NFR BLD AUTO: 1.4 % (ref 0–1.5)
BILIRUB SERPL-MCNC: 0.5 MG/DL (ref 0.2–1.2)
BUN BLD-MCNC: 14 MG/DL (ref 6–20)
BUN/CREAT SERPL: 23.7 (ref 7–25)
CALCIUM SPEC-SCNC: 9.3 MG/DL (ref 8.6–10.5)
CHLORIDE SERPL-SCNC: 105 MMOL/L (ref 98–107)
CHOLEST SERPL-MCNC: 154 MG/DL (ref 0–200)
CO2 SERPL-SCNC: 25.1 MMOL/L (ref 22–29)
CREAT BLD-MCNC: 0.59 MG/DL (ref 0.57–1)
DEPRECATED RDW RBC AUTO: 44.4 FL (ref 37–54)
EOSINOPHIL # BLD AUTO: 0.37 10*3/MM3 (ref 0–0.4)
EOSINOPHIL NFR BLD AUTO: 6.4 % (ref 0.3–6.2)
ERYTHROCYTE [DISTWIDTH] IN BLOOD BY AUTOMATED COUNT: 11.9 % (ref 12.3–15.4)
GFR SERPL CREATININE-BSD FRML MDRD: 111 ML/MIN/1.73
GLOBULIN UR ELPH-MCNC: 2.8 GM/DL
GLUCOSE BLD-MCNC: 93 MG/DL (ref 65–99)
HCT VFR BLD AUTO: 41.2 % (ref 34–46.6)
HDLC SERPL-MCNC: 71 MG/DL (ref 40–60)
HGB BLD-MCNC: 13.2 G/DL (ref 12–15.9)
IMM GRANULOCYTES # BLD AUTO: 0.01 10*3/MM3 (ref 0–0.05)
IMM GRANULOCYTES NFR BLD AUTO: 0.2 % (ref 0–0.5)
LDLC SERPL CALC-MCNC: 72 MG/DL (ref 0–100)
LDLC/HDLC SERPL: 1.01 {RATIO}
LYMPHOCYTES # BLD AUTO: 1.97 10*3/MM3 (ref 0.7–3.1)
LYMPHOCYTES NFR BLD AUTO: 34 % (ref 19.6–45.3)
MCH RBC QN AUTO: 32.5 PG (ref 26.6–33)
MCHC RBC AUTO-ENTMCNC: 32 G/DL (ref 31.5–35.7)
MCV RBC AUTO: 101.5 FL (ref 79–97)
MONOCYTES # BLD AUTO: 0.41 10*3/MM3 (ref 0.1–0.9)
MONOCYTES NFR BLD AUTO: 7.1 % (ref 5–12)
NEUTROPHILS # BLD AUTO: 2.96 10*3/MM3 (ref 1.7–7)
NEUTROPHILS NFR BLD AUTO: 50.9 % (ref 42.7–76)
NRBC BLD AUTO-RTO: 0 /100 WBC (ref 0–0.2)
PLATELET # BLD AUTO: 283 10*3/MM3 (ref 140–450)
PMV BLD AUTO: 10.8 FL (ref 6–12)
POTASSIUM BLD-SCNC: 4.5 MMOL/L (ref 3.5–5.2)
PROT SERPL-MCNC: 7 G/DL (ref 6–8.5)
RBC # BLD AUTO: 4.06 10*6/MM3 (ref 3.77–5.28)
SODIUM BLD-SCNC: 140 MMOL/L (ref 136–145)
TRIGL SERPL-MCNC: 56 MG/DL (ref 0–150)
TSH SERPL DL<=0.05 MIU/L-ACNC: 0.98 MIU/ML (ref 0.27–4.2)
VLDLC SERPL-MCNC: 11.2 MG/DL (ref 5–40)
WBC NRBC COR # BLD: 5.8 10*3/MM3 (ref 3.4–10.8)

## 2019-06-24 PROCEDURE — 69210 REMOVE IMPACTED EAR WAX UNI: CPT | Performed by: INTERNAL MEDICINE

## 2019-06-24 PROCEDURE — 82306 VITAMIN D 25 HYDROXY: CPT | Performed by: INTERNAL MEDICINE

## 2019-06-24 PROCEDURE — 80053 COMPREHEN METABOLIC PANEL: CPT | Performed by: INTERNAL MEDICINE

## 2019-06-24 PROCEDURE — 85025 COMPLETE CBC W/AUTO DIFF WBC: CPT | Performed by: INTERNAL MEDICINE

## 2019-06-24 PROCEDURE — 84443 ASSAY THYROID STIM HORMONE: CPT | Performed by: INTERNAL MEDICINE

## 2019-06-24 PROCEDURE — 36415 COLL VENOUS BLD VENIPUNCTURE: CPT | Performed by: INTERNAL MEDICINE

## 2019-06-24 PROCEDURE — 99396 PREV VISIT EST AGE 40-64: CPT | Performed by: INTERNAL MEDICINE

## 2019-06-24 PROCEDURE — 80061 LIPID PANEL: CPT | Performed by: INTERNAL MEDICINE

## 2019-06-24 PROCEDURE — 99212 OFFICE O/P EST SF 10 MIN: CPT | Performed by: INTERNAL MEDICINE

## 2019-06-24 NOTE — TELEPHONE ENCOUNTER
----- Message from Carie Lancaster MA sent at 6/24/2019 12:13 PM EDT -----  Regarding: Labs ordered after draw  Confirmed with Anabella regarding the additional labs: Vitamin B-12, RA, and CARITO.  Per Anabella pt needs to come back in for a redraw, since those labs require different specimen tubes, and can't be added with current collected lab specimen. Please advise?

## 2019-06-24 NOTE — PROGRESS NOTES
Subjective     Chief Complaint:  Physical Exam.    History of Present Illness    History obtained from the patient.    The patient has a history of left Breast Cancer.  She sees Dr. Lopez.     The patient sees Dr. Zhong for Depression, Anxiety, and Bipolar Disorder.  She has been off Wellbutrin XL, Lamictal, and Xanax.    She has an appointment with him today.    The patient is complaining of bilateral knee pain for the past 2 years, which is worsening.  It has been waking her up at night.  She also has swelling and stiffness of the knees.  She has not tried heat or medication, but states Biofreeze does help.  She reports stable chronic low back pain.    Colon Polyp Follow-Up: The patient is here for follow-up of Colon Polyps newly diagnosed in April 2019.    Interval events: Colonoscopy on 4/2/2019 showed 1 polyp, path consistent with tubular adenoma.    Symptoms: Has chronic constipation.  Denies abdominal pain, diarrhea, melena, hematochezia, and changes in the stool.    Medication: None.  Off Linzess.    Vitamin D Deficiency Follow-up: The patient is here for follow-up of Vitamin D Deficiency, which is stable  Interval events: On 5/22/18,  Vitamin D level was 20.0.   Symptoms: She has had paresthesias (numbness and tingling) of her hands and feet for 6 to 7 months.  Denies fatigue, myalgias, arthralgias, loss of balance, and gait instability.   Medication: None.         Maryana Love is a 43 y.o. female who presents for an Annual Physical.      PMH, PSH, SocHx, FamHx, Allergies, and Medications: Reviewed and updated.    Outpatient Medications Prior to Visit   Medication Sig Dispense Refill   None       Immunization History   Administered Date(s) Administered   • Tdap 02/04/2014         Patient Active Problem List   Diagnosis   • Allergic rhinitis   • Anxiety disorder   • Malignant neoplasm of upper-outer quadrant of left female breast (CMS/HCC)   • Chronic urinary tract infection   • Depression   •  Hearing loss   • Edema   • Hemangioma of liver   • Cyst of ovary   • Vitamin D deficiency   • Bipolar affective disorder (CMS/HCC)   • Tremor   • Tobacco use   • Colon polyps   • Internal hemorrhoids       Health Habits:  Dental Exam. up to date  Eye Exam. up to date  Hearing Loss:  No  Exercise: 0 times/week.  Current exercise activities include: none, but is active  Diet: Yes  Multivitamin: No    Safe Driving:  Yes  Seat Belt:  Yes  Bike Helmet:  N/A  Skin Screening:  Yes  Sunscreen: Yes  SBE / VANESSA: N/A  Sexual Activity:  Yes  Birth Control:  STACI  STD Prevention:  N/A    Last Pap:  (STACI/BSO)  Last Mammogram:  8/18/15, category 5 (bilateral mastectomy)  Last DEXA Scan: None  Last Colonoscopy: 19, tubular adenoma  Last PSA: N/A    Social:    Social History     Socioeconomic History   • Marital status:      Spouse name: Not on file   • Number of children: 3   • Years of education: Not on file   • Highest education level: Not on file   Occupational History   • Occupation: Controller     Comment: full time   Tobacco Use   • Smoking status: Former Smoker     Packs/day: 0.25     Years: 20.00     Pack years: 5.00     Types: Cigarettes     Start date:      Last attempt to quit: 2013     Years since quittin.0   • Smokeless tobacco: Never Used   • Tobacco comment: Quit intermittently for several years at a time.  Vaping daily since    Substance and Sexual Activity   • Alcohol use: Yes     Comment: 3-4 times per week, vodka (1 drink)   • Drug use: No   • Sexual activity: Yes     Partners: Male     Birth control/protection: Surgical         Current Medical Providers:    Annabelle Camargo MD (Internal Medicine / Pediatrics)    The [facility] providers who are involved in the care of this patient are listed above.         Review of Systems   Constitutional: Negative for chills, fatigue, fever and unexpected weight change.        No night sweats.    HENT: Positive for congestion, postnasal drip  and voice change (hoarse with allergies). Negative for ear discharge, ear pain (but has pressure), hearing loss, nosebleeds, rhinorrhea, sinus pressure, sinus pain, sneezing, sore throat and tinnitus.         Denies snoring.   Eyes: Positive for photophobia (chronic ). Negative for pain, discharge, redness, itching and visual disturbance.   Respiratory: Negative for cough, chest tightness, shortness of breath, wheezing and stridor.         No chest congestion.  No hemoptysis.   Cardiovascular: Negative for chest pain, palpitations and leg swelling.        No orthopnea, MARIE, or PND.  No claudication or syncope.   Gastrointestinal: Positive for constipation. Negative for abdominal pain, blood in stool, diarrhea, nausea, rectal pain and vomiting.        No melena.  No hematemesis.  No heartburn, dysphagia or odynophagia.  No early satiety, belching, or bloating.    Endocrine: Positive for cold intolerance, polydipsia and polyphagia. Negative for heat intolerance and polyuria.        No hair loss or dry skin.  No hot flashes.     Genitourinary: Positive for frequency (drinks a lot). Negative for difficulty urinating, dyspareunia, dysuria, flank pain, hematuria, pelvic pain, urgency, vaginal bleeding and vaginal discharge.        No nocturia, incomplete emptying, or incontinence.   Musculoskeletal: Positive for back pain (chronic) and joint swelling (knees). Negative for arthralgias, gait problem, myalgias, neck pain and neck stiffness.        Has knee joint stiffness.  Has bilateral hand pain.   Skin: Negative for rash.        No new skin lesions or changes in skin lesions.    Neurological: Positive for tremors (chronic), speech difficulty (slurs words or has trouble forming words sometimnes) and numbness. Negative for dizziness, syncope, weakness, light-headedness and headaches.        Reports tingling.  No memory loss.  No decreased concentration.   Hematological: Negative for adenopathy. Does not bruise/bleed easily.  "  Psychiatric/Behavioral: Negative for confusion, sleep disturbance and suicidal ideas. The patient is nervous/anxious.         Stable depression.           Objective     Vitals:    06/24/19 1131   BP: 104/64   BP Location: Right arm   Patient Position: Sitting   Pulse: 74   Resp: 20   Temp: 98.8 °F (37.1 °C)   TempSrc: Temporal   Weight: 69.6 kg (153 lb 6 oz)   Height: 164.5 cm (64.75\")       Body mass index is 25.72 kg/m².    Physical Exam   Constitutional: She appears well-developed and well-nourished.   HENT:   Head: Normocephalic and atraumatic.   Right Ear: Tympanic membrane, external ear and ear canal normal.   Left Ear: External ear normal.   Mouth/Throat: Oropharynx is clear and moist. No oral lesions.   Tonsils normal.  Left TM and canal completely occluded by cerumen.  After removal, normal exam.   Eyes: Conjunctivae and EOM are normal. Pupils are equal, round, and reactive to light.   Neck: Normal range of motion. Neck supple. Carotid bruit is not present. No thyroid mass and no thyromegaly present.   Cardiovascular: Normal rate, regular rhythm, normal heart sounds and intact distal pulses. Exam reveals no gallop and no friction rub.   No murmur heard.  Pulmonary/Chest: Effort normal and breath sounds normal.   Breast exam declined due to history of bilateral mastectomy.   Abdominal: Soft. Bowel sounds are normal. She exhibits no distension, no abdominal bruit and no mass. There is no hepatosplenomegaly. There is no tenderness.   Genitourinary:   Genitourinary Comments:  and rectal exam deferred.   Musculoskeletal: Normal range of motion.   There is no erythema, edema,  warmth, or tenderness to palpation of the knees.  There is pain in both knees with flexion and extension.  There is bilateral crepitus.  There is no knee instability.   Lymphadenopathy:     She has no cervical adenopathy. No inguinal adenopathy noted on the right or left side.        Right: No inguinal and no supraclavicular adenopathy " present.        Left: No inguinal and no supraclavicular adenopathy present.   Neurological: She is alert. She has normal strength and normal reflexes. No cranial nerve deficit. Coordination and gait normal.   Skin: No lesion and no rash noted.   No atypical skin lesions.   Psychiatric: She has a normal mood and affect.   Nursing note and vitals reviewed.      PHQ-2 Depression Screening  Little interest or pleasure in doing things? 0   Feeling down, depressed, or hopeless? 0   PHQ-2 Total Score 0       Ear Cerumen Removal Instrumentation  Date/Time: 6/24/2019 12:20 PM  Performed by: Annabelle Camargo MD  Authorized by: Annabelle Camargo MD   Consent: Verbal consent obtained. Written consent not obtained.  Consent given by: patient  Patient understanding: patient states understanding of the procedure being performed    Anesthesia:  Local Anesthetic: none  Cerumenolytics applied prior to procedure: None.  Location details: left ear  Patient tolerance: Patient tolerated the procedure well with no immediate complications  Procedure type: curette   Sedation:  Patient sedated: no            Counseling was given to patient for the following topics:  appropriate exercise, healthy eating habits, disease prevention, risk factors for cancer, importance of immunizations, including risks and benefits, sun safety, seatbelt use and safe driving. Also discussed the importance of regular dental and vision care, as well recommendation for a yearly screening skin exam after age 40.  Written information provided to patient on these topics and other health maintenance issues.      Assessment/Plan       Maryana was seen today for annual exam.    Diagnoses and all orders for this visit:    Encounter for health maintenance examination in adult  -     Lipid Panel  -     Comprehensive Metabolic Panel  -     TSH  -     Vitamin D 25 Hydroxy  -     CBC & Differential  -     CBC Auto Differential  -     Vitamin B12; Future  -     C-reactive Protein;  Future  -     Rheumatoid Factor; Future  -     CARITO; Future    Malignant neoplasm of upper-outer quadrant of left breast in female, estrogen receptor positive (CMS/HCC)   Follow up per Dr. Lopez.    Polyp of colon, unspecified part of colon, unspecified type   Colonoscopy up-to-date.    Vitamin D deficiency  -     Vitamin D 25 Hydroxy    Acute pain of both knees  -     XR Knee 1 or 2 View Bilateral; Future   Recommended trial of NSAIDs, as well as ice.    Pain in joint involving multiple sites  -     C-reactive Protein; Future  -     Rheumatoid Factor; Future  -     CARITO; Future    Paresthesias  -     Vitamin B12; Future    Impacted cerumen of left ear  -     Ear Cerumen Removal Instrumentation    Other depression   Follow-up per Psychiatry.    Generalized anxiety disorder   Follow-up per Psychiatry.    Screening for cardiovascular condition  -     Lipid Panel  -     Comprehensive Metabolic Panel  -     TSH  -     Vitamin D 25 Hydroxy  -     CBC & Differential  -     CBC Auto Differential  -     Vitamin B12; Future  -     C-reactive Protein; Future  -     Rheumatoid Factor; Future  -     CARITO; Future          Return in about 1 year (around 6/24/2020) for Annual physical, fasting.

## 2019-06-26 ENCOUNTER — LAB (OUTPATIENT)
Dept: LAB | Facility: HOSPITAL | Age: 44
End: 2019-06-26

## 2019-06-26 ENCOUNTER — HOSPITAL ENCOUNTER (OUTPATIENT)
Dept: GENERAL RADIOLOGY | Facility: HOSPITAL | Age: 44
Discharge: HOME | End: 2019-06-26
Admitting: INTERNAL MEDICINE

## 2019-06-26 DIAGNOSIS — M25.562 ACUTE PAIN OF BOTH KNEES: ICD-10-CM

## 2019-06-26 DIAGNOSIS — Z00.00 ENCOUNTER FOR HEALTH MAINTENANCE EXAMINATION IN ADULT: ICD-10-CM

## 2019-06-26 DIAGNOSIS — Z13.6 SCREENING FOR CARDIOVASCULAR CONDITION: ICD-10-CM

## 2019-06-26 DIAGNOSIS — M25.561 ACUTE PAIN OF BOTH KNEES: ICD-10-CM

## 2019-06-26 DIAGNOSIS — R20.2 PARESTHESIAS: ICD-10-CM

## 2019-06-26 DIAGNOSIS — M25.50 PAIN IN JOINT INVOLVING MULTIPLE SITES: ICD-10-CM

## 2019-06-26 LAB
CHROMATIN AB SERPL-ACNC: 14.3 IU/ML (ref 0–14)
CRP SERPL-MCNC: 0.07 MG/DL (ref 0–0.5)
VIT B12 BLD-MCNC: 489 PG/ML (ref 211–946)

## 2019-06-26 PROCEDURE — 86038 ANTINUCLEAR ANTIBODIES: CPT

## 2019-06-26 PROCEDURE — 86140 C-REACTIVE PROTEIN: CPT

## 2019-06-26 PROCEDURE — 82607 VITAMIN B-12: CPT

## 2019-06-26 PROCEDURE — 73560 X-RAY EXAM OF KNEE 1 OR 2: CPT

## 2019-06-26 PROCEDURE — 86431 RHEUMATOID FACTOR QUANT: CPT

## 2019-06-28 ENCOUNTER — TELEPHONE (OUTPATIENT)
Dept: INTERNAL MEDICINE | Facility: CLINIC | Age: 44
End: 2019-06-28

## 2019-06-28 DIAGNOSIS — M05.80 POLYARTHRITIS WITH POSITIVE RHEUMATOID FACTOR (HCC): ICD-10-CM

## 2019-06-28 DIAGNOSIS — M25.50 PAIN IN JOINT INVOLVING MULTIPLE SITES: Primary | ICD-10-CM

## 2019-06-28 LAB — ANA SER QL: NEGATIVE

## 2019-07-01 NOTE — TELEPHONE ENCOUNTER
Maryana notified of xray results.  She states she has been having pain in her knees for years.   She is going to increase her exercises to strenthen the knees     She does take aleve occasionally.      Suggested that she wear a knee sleeve to give more support when walking

## 2019-08-15 ENCOUNTER — OFFICE VISIT (OUTPATIENT)
Dept: INTERNAL MEDICINE | Facility: CLINIC | Age: 44
End: 2019-08-15

## 2019-08-15 VITALS
BODY MASS INDEX: 27.42 KG/M2 | TEMPERATURE: 97.7 F | SYSTOLIC BLOOD PRESSURE: 102 MMHG | RESPIRATION RATE: 18 BRPM | DIASTOLIC BLOOD PRESSURE: 64 MMHG | WEIGHT: 163.5 LBS | HEART RATE: 80 BPM

## 2019-08-15 DIAGNOSIS — H10.31 ACUTE BACTERIAL CONJUNCTIVITIS OF RIGHT EYE: Primary | ICD-10-CM

## 2019-08-15 PROCEDURE — 99213 OFFICE O/P EST LOW 20 MIN: CPT | Performed by: NURSE PRACTITIONER

## 2019-08-15 RX ORDER — TAMOXIFEN CITRATE 20 MG/1
TABLET ORAL DAILY
COMMUNITY
End: 2019-10-24 | Stop reason: SDUPTHER

## 2019-08-15 RX ORDER — BUPROPION HYDROCHLORIDE 150 MG/1
150 TABLET ORAL DAILY
COMMUNITY
End: 2019-12-13 | Stop reason: SDUPTHER

## 2019-08-15 RX ORDER — POLYMYXIN B SULFATE AND TRIMETHOPRIM 1; 10000 MG/ML; [USP'U]/ML
1 SOLUTION OPHTHALMIC EVERY 4 HOURS
Qty: 10 ML | Refills: 0 | Status: SHIPPED | OUTPATIENT
Start: 2019-08-15 | End: 2019-08-22

## 2019-08-15 NOTE — PROGRESS NOTES
"Subjective:    Maryana Love is a 44 y.o. female.     Chief Complaint   Patient presents with   • Conjunctivitis     Right eye x1 day       History of Present Illness   Patient complains of \"Pink eye\" in right eye that began today with eye redness, discharge and intense itching. She reports recent contact with someone in her office with an eye infection. She states she has had this before and knows how it feels and wants treatment today. No change in vision or eye pain.     Current Outpatient Medications:   •  buPROPion XL (WELLBUTRIN XL) 150 MG 24 hr tablet, Take 150 mg by mouth Daily., Disp: , Rfl:   •  tamoxifen (NOLVADEX) 20 MG chemo tablet, Take  by mouth Daily., Disp: , Rfl:   •  trimethoprim-polymyxin b (POLYTRIM) 20964-7.1 UNIT/ML-% ophthalmic solution, Administer 1 drop to the right eye Every 4 (Four) Hours for 7 days., Disp: 10 mL, Rfl: 0     The following portions of the patient's history were reviewed and updated as appropriate: allergies, current medications, past family history, past medical history, past social history, past surgical history and problem list.    Review of Systems   Constitutional: Negative for chills, fatigue and fever.   HENT: Negative for congestion, ear pain, postnasal drip, rhinorrhea, sinus pressure, sneezing and sore throat.    Eyes: Positive for discharge, redness and itching. Negative for photophobia, pain and visual disturbance.   Respiratory: Negative for cough, chest tightness, shortness of breath and wheezing.    Cardiovascular: Negative for chest pain.   Gastrointestinal: Negative for abdominal pain, diarrhea, nausea and vomiting.   Musculoskeletal: Negative for arthralgias and myalgias.   Skin: Negative for rash.   Neurological: Negative for headaches.   Hematological: Negative for adenopathy.       Objective:    /64 (BP Location: Right arm, Patient Position: Sitting)   Pulse 80   Temp 97.7 °F (36.5 °C) (Temporal)   Resp 18   Wt 74.2 kg (163 lb 8 oz)   LMP  " (LMP Unknown) Comment: LAST MAMOGRAM 8/2015  BMI 27.42 kg/m²     Physical Exam   Constitutional: She is oriented to person, place, and time. She appears well-developed and well-nourished. She is active and cooperative.  Non-toxic appearance. She does not have a sickly appearance. She does not appear ill. No distress.   HENT:   Head: Normocephalic and atraumatic.   Right Ear: External ear normal.   Left Ear: External ear normal.   Nose: Nose normal.   Mouth/Throat: Uvula is midline, oropharynx is clear and moist and mucous membranes are normal. No oral lesions.   Eyes: EOM and lids are normal. Right eye exhibits no discharge and no exudate. Right conjunctiva is injected. Right conjunctiva has no hemorrhage. Left conjunctiva is not injected. Left conjunctiva has no hemorrhage.   Neck: Normal range of motion. Neck supple.   Cardiovascular: Normal rate and regular rhythm.   No murmur heard.  Pulmonary/Chest: Effort normal and breath sounds normal.   Lymphadenopathy:     She has no cervical adenopathy.   Neurological: She is alert and oriented to person, place, and time.   Skin: Skin is warm, dry and intact. No rash noted.   Psychiatric: She has a normal mood and affect. Her speech is normal.   Nursing note and vitals reviewed.      Assessment/Plan:    Maryana was seen today for conjunctivitis.    Diagnoses and all orders for this visit:    Acute bacterial conjunctivitis of right eye  -     trimethoprim-polymyxin b (POLYTRIM) 36959-6.1 UNIT/ML-% ophthalmic solution; Administer 1 drop to the right eye Every 4 (Four) Hours for 7 days.    Adequate hand washing. Return or call if no relief.     Return if symptoms worsen or fail to improve.

## 2019-08-16 ENCOUNTER — TELEPHONE (OUTPATIENT)
Dept: INTERNAL MEDICINE | Facility: CLINIC | Age: 44
End: 2019-08-16

## 2019-08-16 NOTE — TELEPHONE ENCOUNTER
----- Message from Rosalind Christian sent at 8/16/2019  1:24 PM EDT -----  Pt was already oliver    ----- Message -----  From: Rosalind Christian  Sent: 8/13/2019   9:22 AM  To: Rosalind Christian    LVM for Ilda at [facility] that pt has  select and does no need a referral but that I attempted to submit it and [facility] site also said she does not need one.  I left my direct line to call if they needed anything else and to please [facility] pt and fax appt info back to my fax number   ----- Message -----  From: Rosalind Christian  Sent: 8/9/2019   2:03 PM  To: Rosalind Christian     Pt has  select which does not req referral-I did try to submit but [facility] site said no referral req-I tried to call Rheum back and they are closed Friday afternoons    ----- Message -----  From: Kezia Lim  Sent: 8/9/2019  12:47 PM  To: Rosalind Christian    [facility] called and states they need a referral approval from [facility] for Dr. Virk NPI [id number] before patient can be scheduled fax [phone] phone [phone]

## 2019-10-24 ENCOUNTER — OFFICE VISIT (OUTPATIENT)
Dept: ONCOLOGY | Facility: CLINIC | Age: 44
End: 2019-10-24

## 2019-10-24 VITALS
TEMPERATURE: 98.9 F | WEIGHT: 153 LBS | SYSTOLIC BLOOD PRESSURE: 123 MMHG | HEIGHT: 65 IN | HEART RATE: 83 BPM | DIASTOLIC BLOOD PRESSURE: 61 MMHG | RESPIRATION RATE: 18 BRPM | BODY MASS INDEX: 25.49 KG/M2 | OXYGEN SATURATION: 97 %

## 2019-10-24 DIAGNOSIS — Z17.0 MALIGNANT NEOPLASM OF UPPER-OUTER QUADRANT OF LEFT BREAST IN FEMALE, ESTROGEN RECEPTOR POSITIVE (HCC): Primary | ICD-10-CM

## 2019-10-24 DIAGNOSIS — C50.412 MALIGNANT NEOPLASM OF UPPER-OUTER QUADRANT OF LEFT BREAST IN FEMALE, ESTROGEN RECEPTOR POSITIVE (HCC): Primary | ICD-10-CM

## 2019-10-24 PROCEDURE — 99213 OFFICE O/P EST LOW 20 MIN: CPT | Performed by: INTERNAL MEDICINE

## 2019-10-24 RX ORDER — TAMOXIFEN CITRATE 20 MG/1
20 TABLET ORAL DAILY
Qty: 90 TABLET | Refills: 3 | Status: SHIPPED | OUTPATIENT
Start: 2019-10-24 | End: 2019-12-13 | Stop reason: SDUPTHER

## 2019-10-24 RX ORDER — LAMOTRIGINE 150 MG/1
300 TABLET ORAL NIGHTLY
COMMUNITY

## 2019-10-24 RX ORDER — METHENAMINE HIPPURATE 1000 MG/1
1 TABLET ORAL 2 TIMES DAILY WITH MEALS
COMMUNITY
End: 2019-12-13 | Stop reason: SDUPTHER

## 2019-10-24 NOTE — PROGRESS NOTES
"PROBLEM LIST:  Oncology/Hematology History    1. Stage IIA, ER/DC positive, HER-2 negative left breast cancer measuring  about 2.3 cm in largest dimension. Dx: 8/18/2015   2. Oncotype DX score of 17 placing her at low risk for recurrence.   3. Currently on tamoxifen initiated in 11/2015.  4. Genetic testing was negative with the BreastNext panel.         Malignant neoplasm of upper-outer quadrant of left female breast (CMS/HCC)    8/18/2015 Initial Diagnosis     Malignant neoplasm of upper-outer quadrant of left female breast            REASON FOR VISIT: Stage II breast cancer with axillary adenopathy    HISTORY OF PRESENT ILLNESS:   44 y.o.  lady with stage II breast cancer presents today for follow-up.  She is currently 4 years out from her diagnosis. She is currently on tamoxifen and tolerating it well.  No significant issues ongoing.  Denies any pain.    Past medical history, social history and family history was reviewed and unchanged from prior visit.    Review of Systems:    Review of Systems   Constitutional: Negative.    HENT:  Negative.    Eyes: Negative.    Respiratory: Negative.    Cardiovascular: Negative.    Gastrointestinal: Negative.    Endocrine: Negative.    Genitourinary: Negative.     Musculoskeletal: Negative.    Skin: Negative.    Neurological: Negative.    Hematological: Negative.    Psychiatric/Behavioral: Negative.       A comprehensive 14 point review of systems was performed and was negative except as mentioned.      Medications:  The current medication list was reviewed in the EMR    ALLERGIES:    Allergies   Allergen Reactions   • Nitrofurantoin Hives   • Percocet [Oxycodone-Acetaminophen] Hives         Physical Exam    VITAL SIGNS:  /61 Comment: RUE  Pulse 83   Temp 98.9 °F (37.2 °C) (Temporal)   Resp 18   Ht 164.5 cm (64.75\")   Wt 69.4 kg (153 lb)   LMP  (LMP Unknown) Comment: LAST MAMOGRAM 8/2015  SpO2 97% Comment: RA  BMI 25.66 kg/m²      Performance Status: " 0    General: well appearing, in no acute distress  HEENT: sclera anicteric, oropharynx clear, neck is supple  Lymphatics:subtle small lymph node on the left  Cardiovascular: regular rate and rhythm, no murmurs, rubs or gallops  Lungs: clear to auscultation bilaterally  Abdomen: soft, nontender, nondistended.  No palpable organomegaly  Extremities: no lower extremity edema  Skin: no rashes, lesions, bruising, or petechiae  Msk:  Shows no weakness of the large muscle groups  Psych: pressured speech         RECENT LABS:    Lab Results   Component Value Date    WBC 5.80 06/24/2019    HGB 13.2 06/24/2019    HCT 41.2 06/24/2019    .5 (H) 06/24/2019     06/24/2019      Lab Results   Component Value Date    GLUCOSE 93 06/24/2019    BUN 14 06/24/2019    CREATININE 0.59 06/24/2019    EGFRIFNONA 111 06/24/2019    BCR 23.7 06/24/2019    CO2 25.1 06/24/2019    CALCIUM 9.3 06/24/2019    ALBUMIN 4.20 06/24/2019    AST 15 06/24/2019    ALT 13 06/24/2019             Assessment/Plan    1. Stage II a ER/MT positive low grade left upper quadrant breast cancer.   She is now 4 years out.  Doing reasonably well.  Continue tamoxifen for 5 years.    2.  Bipolar disorder: Better controlled for now.  Had a decompensation over the summer due to medication change.      I spent a total of 15 minutes in direct patient care, greater than 10 minutes (greater than 50%) were spent in coordination of care, and counseling the patient regarding  Breast cancer . Answered any questions patient had with medication and plan.      Coleen Bautista MD  [facility] Hematology and Oncology    10/24/2019         Please note that portions of this note may have been completed with a voice recognition program. Efforts were made to edit the dictations, but occasionally words are mistranscribed.          Statement Selected

## 2019-12-13 ENCOUNTER — TELEPHONE (OUTPATIENT)
Dept: INTERNAL MEDICINE | Facility: CLINIC | Age: 44
End: 2019-12-13

## 2019-12-13 ENCOUNTER — OFFICE VISIT (OUTPATIENT)
Dept: INTERNAL MEDICINE | Facility: CLINIC | Age: 44
End: 2019-12-13

## 2019-12-13 VITALS
BODY MASS INDEX: 26.01 KG/M2 | RESPIRATION RATE: 20 BRPM | DIASTOLIC BLOOD PRESSURE: 72 MMHG | WEIGHT: 155.13 LBS | HEART RATE: 72 BPM | TEMPERATURE: 98.6 F | SYSTOLIC BLOOD PRESSURE: 100 MMHG

## 2019-12-13 DIAGNOSIS — N39.0 ACUTE UTI: Primary | ICD-10-CM

## 2019-12-13 DIAGNOSIS — Z79.899 HIGH RISK MEDICATION USE: ICD-10-CM

## 2019-12-13 DIAGNOSIS — R55 SYNCOPE, UNSPECIFIED SYNCOPE TYPE: ICD-10-CM

## 2019-12-13 DIAGNOSIS — Z23 NEED FOR IMMUNIZATION AGAINST INFLUENZA: ICD-10-CM

## 2019-12-13 LAB
BILIRUB BLD-MCNC: NEGATIVE MG/DL
CLARITY, POC: ABNORMAL
COLOR UR: ABNORMAL
EXPIRATION DATE: ABNORMAL
GLUCOSE UR STRIP-MCNC: NEGATIVE MG/DL
KETONES UR QL: ABNORMAL
LEUKOCYTE EST, POC: NEGATIVE
Lab: ABNORMAL
NITRITE UR-MCNC: NEGATIVE MG/ML
PH UR: 6 [PH] (ref 5–8)
PROT UR STRIP-MCNC: NEGATIVE MG/DL
RBC # UR STRIP: ABNORMAL /UL
SP GR UR: 1.02 (ref 1–1.03)
UROBILINOGEN UR QL: NORMAL

## 2019-12-13 PROCEDURE — 90471 IMMUNIZATION ADMIN: CPT | Performed by: INTERNAL MEDICINE

## 2019-12-13 PROCEDURE — 80053 COMPREHEN METABOLIC PANEL: CPT | Performed by: INTERNAL MEDICINE

## 2019-12-13 PROCEDURE — 90686 IIV4 VACC NO PRSV 0.5 ML IM: CPT | Performed by: INTERNAL MEDICINE

## 2019-12-13 PROCEDURE — 87086 URINE CULTURE/COLONY COUNT: CPT | Performed by: INTERNAL MEDICINE

## 2019-12-13 PROCEDURE — 36415 COLL VENOUS BLD VENIPUNCTURE: CPT | Performed by: INTERNAL MEDICINE

## 2019-12-13 PROCEDURE — 85025 COMPLETE CBC W/AUTO DIFF WBC: CPT | Performed by: INTERNAL MEDICINE

## 2019-12-13 PROCEDURE — 93000 ELECTROCARDIOGRAM COMPLETE: CPT | Performed by: INTERNAL MEDICINE

## 2019-12-13 PROCEDURE — 81003 URINALYSIS AUTO W/O SCOPE: CPT | Performed by: INTERNAL MEDICINE

## 2019-12-13 PROCEDURE — 99214 OFFICE O/P EST MOD 30 MIN: CPT | Performed by: INTERNAL MEDICINE

## 2019-12-13 RX ORDER — BUPROPION HYDROCHLORIDE 150 MG/1
300 TABLET ORAL DAILY
COMMUNITY

## 2019-12-13 RX ORDER — METHENAMINE HIPPURATE 1000 MG/1
1 TABLET ORAL 2 TIMES DAILY
COMMUNITY
End: 2020-08-31

## 2019-12-13 NOTE — PROGRESS NOTES
Subjective       Maryana Love is a 44 y.o. female.     Chief Complaint   Patient presents with   • Urinary Tract Infection       History obtained from the patient.    The patient states she was seen at the [facility] in [place] in early November.  She was diagnosed with a UTI and treated with Bactrim DS for 3 days.  She does not feel like she got completely better.      Urinary Tract Infection    Chronicity: Follow-up. Episode onset: 6 weeks ago. The problem occurs intermittently. The problem has been waxing and waning. The patient is experiencing no pain. There has been no fever. She is sexually active. There is no history of pyelonephritis. Associated symptoms include a discharge (scant white, no itching). Pertinent negatives include no chills, flank pain, frequency, hematuria, nausea, urgency or vomiting. Associated symptoms comments: Urine is cloudy.  . Treatments tried: Took Azo x 1. The treatment provided moderate relief. Her past medical history is significant for recurrent UTIs. There is no history of kidney stones.      The patient is complaining of 2 episodes of passing out in the past 1 month.  This is a new problem.  With both episodes, she had dizziness and felt like the room was spinning.  She denies nausea, GI upset, or any type of pain with these episodes.  With the first episode, approximately 1 month ago, she was able to get to the bed and lay down before passing out.  With the second episode, approximately 2.5 weeks ago, she lay on the bathroom floor for 10 minutes because she felt like she was going to pass out.  She then got up and passed out.  She has never had a problem with this in the past.  She had some blurred vision and weakness.  She denies double vision or any other symptoms with these episodes.  She states she was not ill at the time of these episodes.  Last labs done on 6/24/2019 were normal.    The patient is requesting an EKG.  Dr. Zhong, her Psychiatrist, will be starting  her on Lithium,  and needs a baseline.      The following portions of the patient's history were reviewed and updated as appropriate: allergies, current medications, past family history, past medical history, past social history, past surgical history and problem list.      Review of Systems   Constitutional: Negative for chills and fever.   HENT: Negative for nosebleeds.    Eyes: Positive for visual disturbance. Negative for photophobia.   Respiratory: Negative for shortness of breath.         Denies hemoptysis   Cardiovascular: Negative for chest pain and palpitations.   Gastrointestinal: Positive for abdominal pain (with constipation) and constipation (chronic). Negative for blood in stool, diarrhea, nausea and vomiting.        Denies melena and hematemesis.   Genitourinary: Positive for vaginal discharge. Negative for flank pain, frequency, hematuria, urgency and vaginal bleeding.   Neurological: Positive for dizziness, syncope, weakness and light-headedness. Negative for seizures, speech difficulty, numbness and headaches.        No tingling.   Hematological: Bruises/bleeds easily (always bruises easily).           Objective     Blood pressure 100/72, pulse 72, temperature 98.6 °F (37 °C), temperature source Temporal, resp. rate 20, weight 70.4 kg (155 lb 2 oz), not currently breastfeeding.    Physical Exam   Constitutional: She is oriented to person, place, and time. She appears well-developed and well-nourished.   HENT:   Right Ear: Tympanic membrane, external ear and ear canal normal.   Left Ear: Tympanic membrane, external ear and ear canal normal.   Mouth/Throat: Oropharynx is clear and moist.   Eyes: Pupils are equal, round, and reactive to light. EOM are normal.   Neck: Normal range of motion. Neck supple. Carotid bruit is not present.   Cardiovascular: Normal rate, regular rhythm, normal heart sounds and intact distal pulses.   No murmur heard.  Pulmonary/Chest: Effort normal and breath sounds normal.    Abdominal: Soft. Bowel sounds are normal. She exhibits no distension and no mass. There is no hepatosplenomegaly. There is no tenderness. There is no CVA tenderness.   Musculoskeletal: Normal range of motion. She exhibits no edema.   Neurological: She is alert and oriented to person, place, and time. She has normal strength and normal reflexes. No cranial nerve deficit. Coordination and gait normal.   The patient is able to do finger to nose, heel to shin, and rapid alternating movements without difficulty.  The patient is able to heel and toe walk, but was unable to do tandem walking.   Skin: No rash noted.   Psychiatric: She has a normal mood and affect.   Nursing note and vitals reviewed.        ECG 12 Lead  Date/Time: 12/13/2019 1:09 PM  Performed by: Annabelle Camargo MD  Authorized by: Annabelle Camargo MD   Comparison: not compared with previous ECG   Previous ECG: no previous ECG available  Rhythm: sinus rhythm  Ectopy comments: None  Rate: normal  Conduction: conduction normal  ST Segments: ST segments normal  T Waves: T waves normal  QRS axis: normal  Other: no other findings    Clinical impression: normal ECG          Results for orders placed or performed in visit on 12/13/19   POC Urinalysis Dipstick, Automated   Result Value Ref Range    Color Straw Yellow, Straw, Dark Yellow, Briana    Clarity, UA Hazy (A) Clear    Specific Gravity  1.020 1.005 - 1.030    pH, Urine 6.0 5.0 - 8.0    Leukocytes Negative Negative    Nitrite, UA Negative Negative    Protein, POC Negative Negative mg/dL    Glucose, UA Negative Negative, 1000 mg/dL (3+) mg/dL    Ketones, UA 15 mg/dL (A) Negative    Urobilinogen, UA Normal Normal    Bilirubin Negative Negative    Blood, UA 50 Samson/ul (A) Negative    Lot Number [id number]     Expiration Date 7-31-20          Assessment/Plan   Maryana was seen today for urinary tract infection.    Diagnoses and all orders for this visit:    Acute UTI  -     Urine Culture - Urine, Urine, Clean  Catch  -     POC Urinalysis Dipstick, Automated   Recommend plenty of fluids.   Will await culture results prior to treating with antibiotics.    Syncope, unspecified syncope type  -     ECG 12 Lead  -     CBC & Differential  -     Comprehensive Metabolic Panel  -     CBC Auto Differential   May need MRI.    High risk medication use  -     ECG 12 Lead    Need for immunization against influenza  -     Fluarix/Fluzone/Afluria Quad>6 Months            Return if symptoms worsen or fail to improve.

## 2019-12-14 LAB
ALBUMIN SERPL-MCNC: 4.2 G/DL (ref 3.5–5.2)
ALBUMIN/GLOB SERPL: 1.4 G/DL
ALP SERPL-CCNC: 58 U/L (ref 39–117)
ALT SERPL W P-5'-P-CCNC: 13 U/L (ref 1–33)
ANION GAP SERPL CALCULATED.3IONS-SCNC: 12.6 MMOL/L (ref 5–15)
AST SERPL-CCNC: 16 U/L (ref 1–32)
BASOPHILS # BLD AUTO: 0.15 10*3/MM3 (ref 0–0.2)
BASOPHILS NFR BLD AUTO: 1.7 % (ref 0–1.5)
BILIRUB SERPL-MCNC: 0.2 MG/DL (ref 0.2–1.2)
BUN BLD-MCNC: 17 MG/DL (ref 6–20)
BUN/CREAT SERPL: 18.9 (ref 7–25)
CALCIUM SPEC-SCNC: 9.7 MG/DL (ref 8.6–10.5)
CHLORIDE SERPL-SCNC: 108 MMOL/L (ref 98–107)
CO2 SERPL-SCNC: 24.4 MMOL/L (ref 22–29)
CREAT BLD-MCNC: 0.9 MG/DL (ref 0.57–1)
DEPRECATED RDW RBC AUTO: 42 FL (ref 37–54)
EOSINOPHIL # BLD AUTO: 0.71 10*3/MM3 (ref 0–0.4)
EOSINOPHIL NFR BLD AUTO: 8.2 % (ref 0.3–6.2)
ERYTHROCYTE [DISTWIDTH] IN BLOOD BY AUTOMATED COUNT: 11.7 % (ref 12.3–15.4)
GFR SERPL CREATININE-BSD FRML MDRD: 68 ML/MIN/1.73
GLOBULIN UR ELPH-MCNC: 3.1 GM/DL
GLUCOSE BLD-MCNC: 90 MG/DL (ref 65–99)
HCT VFR BLD AUTO: 41.3 % (ref 34–46.6)
HGB BLD-MCNC: 13.7 G/DL (ref 12–15.9)
IMM GRANULOCYTES # BLD AUTO: 0.02 10*3/MM3 (ref 0–0.05)
IMM GRANULOCYTES NFR BLD AUTO: 0.2 % (ref 0–0.5)
LYMPHOCYTES # BLD AUTO: 2.88 10*3/MM3 (ref 0.7–3.1)
LYMPHOCYTES NFR BLD AUTO: 33.1 % (ref 19.6–45.3)
MCH RBC QN AUTO: 32.3 PG (ref 26.6–33)
MCHC RBC AUTO-ENTMCNC: 33.2 G/DL (ref 31.5–35.7)
MCV RBC AUTO: 97.4 FL (ref 79–97)
MONOCYTES # BLD AUTO: 0.59 10*3/MM3 (ref 0.1–0.9)
MONOCYTES NFR BLD AUTO: 6.8 % (ref 5–12)
NEUTROPHILS # BLD AUTO: 4.36 10*3/MM3 (ref 1.7–7)
NEUTROPHILS NFR BLD AUTO: 50 % (ref 42.7–76)
NRBC BLD AUTO-RTO: 0.1 /100 WBC (ref 0–0.2)
PLATELET # BLD AUTO: 313 10*3/MM3 (ref 140–450)
PMV BLD AUTO: 11 FL (ref 6–12)
POTASSIUM BLD-SCNC: 5 MMOL/L (ref 3.5–5.2)
PROT SERPL-MCNC: 7.3 G/DL (ref 6–8.5)
RBC # BLD AUTO: 4.24 10*6/MM3 (ref 3.77–5.28)
SODIUM BLD-SCNC: 145 MMOL/L (ref 136–145)
WBC NRBC COR # BLD: 8.71 10*3/MM3 (ref 3.4–10.8)

## 2019-12-15 LAB — BACTERIA SPEC AEROBE CULT: NO GROWTH

## 2019-12-16 DIAGNOSIS — R55 SYNCOPE, UNSPECIFIED SYNCOPE TYPE: Primary | ICD-10-CM

## 2020-01-17 ENCOUNTER — TRANSCRIBE ORDERS (OUTPATIENT)
Dept: LAB | Facility: HOSPITAL | Age: 45
End: 2020-01-17

## 2020-01-17 ENCOUNTER — LAB (OUTPATIENT)
Dept: LAB | Facility: HOSPITAL | Age: 45
End: 2020-01-17

## 2020-01-17 DIAGNOSIS — F31.9 BIPOLAR AFFECTIVE DISORDER, REMISSION STATUS UNSPECIFIED (HCC): Primary | ICD-10-CM

## 2020-01-17 DIAGNOSIS — F31.9 BIPOLAR AFFECTIVE DISORDER, REMISSION STATUS UNSPECIFIED (HCC): ICD-10-CM

## 2020-01-17 LAB
DEPRECATED RDW RBC AUTO: 38.9 FL (ref 37–54)
ERYTHROCYTE [DISTWIDTH] IN BLOOD BY AUTOMATED COUNT: 11.3 % (ref 12.3–15.4)
HCT VFR BLD AUTO: 39.4 % (ref 34–46.6)
HGB BLD-MCNC: 13.1 G/DL (ref 12–15.9)
MCH RBC QN AUTO: 31.6 PG (ref 26.6–33)
MCHC RBC AUTO-ENTMCNC: 33.2 G/DL (ref 31.5–35.7)
MCV RBC AUTO: 94.9 FL (ref 79–97)
PLATELET # BLD AUTO: 346 10*3/MM3 (ref 140–450)
PMV BLD AUTO: 10.7 FL (ref 6–12)
RBC # BLD AUTO: 4.15 10*6/MM3 (ref 3.77–5.28)
WBC NRBC COR # BLD: 10.19 10*3/MM3 (ref 3.4–10.8)

## 2020-01-17 PROCEDURE — 85027 COMPLETE CBC AUTOMATED: CPT

## 2020-01-17 PROCEDURE — 80053 COMPREHEN METABOLIC PANEL: CPT

## 2020-01-17 PROCEDURE — 80178 ASSAY OF LITHIUM: CPT

## 2020-01-17 PROCEDURE — 36415 COLL VENOUS BLD VENIPUNCTURE: CPT

## 2020-01-18 LAB
ALBUMIN SERPL-MCNC: 4.2 G/DL (ref 3.5–5.2)
ALBUMIN/GLOB SERPL: 1.4 G/DL
ALP SERPL-CCNC: 53 U/L (ref 39–117)
ALT SERPL W P-5'-P-CCNC: 13 U/L (ref 1–33)
ANION GAP SERPL CALCULATED.3IONS-SCNC: 14.2 MMOL/L (ref 5–15)
AST SERPL-CCNC: 17 U/L (ref 1–32)
BILIRUB SERPL-MCNC: 0.2 MG/DL (ref 0.2–1.2)
BUN BLD-MCNC: 19 MG/DL (ref 6–20)
BUN/CREAT SERPL: 21.3 (ref 7–25)
CALCIUM SPEC-SCNC: 9.7 MG/DL (ref 8.6–10.5)
CHLORIDE SERPL-SCNC: 104 MMOL/L (ref 98–107)
CO2 SERPL-SCNC: 22.8 MMOL/L (ref 22–29)
CREAT BLD-MCNC: 0.89 MG/DL (ref 0.57–1)
GFR SERPL CREATININE-BSD FRML MDRD: 69 ML/MIN/1.73
GLOBULIN UR ELPH-MCNC: 2.9 GM/DL
GLUCOSE BLD-MCNC: 88 MG/DL (ref 65–99)
LITHIUM SERPL-SCNC: 0.8 MMOL/L (ref 0.6–1.2)
POTASSIUM BLD-SCNC: 4.7 MMOL/L (ref 3.5–5.2)
PROT SERPL-MCNC: 7.1 G/DL (ref 6–8.5)
SODIUM BLD-SCNC: 141 MMOL/L (ref 136–145)

## 2020-02-03 ENCOUNTER — TELEPHONE (OUTPATIENT)
Dept: INTERNAL MEDICINE | Facility: CLINIC | Age: 45
End: 2020-02-03

## 2020-02-03 NOTE — TELEPHONE ENCOUNTER
2-3-20  S/w patient informed her that she needed to reschedule her MRI , and that I had seen that she had one scheduled on 1-13-20 but was a NO SHOW she stated she had forgotten that appointment.  I gave patient the scheduling number and she stated was calling to schedule a new appointment date and time.  Gave verbal understanding.

## 2020-03-17 ENCOUNTER — TELEPHONE (OUTPATIENT)
Dept: INTERNAL MEDICINE | Facility: CLINIC | Age: 45
End: 2020-03-17

## 2020-03-17 NOTE — TELEPHONE ENCOUNTER
Maryana states she is going to reschedule the MRI next month . She has been really busy with work.

## 2020-03-17 NOTE — TELEPHONE ENCOUNTER
----- Message from Elizabeth Gonsales sent at 3/17/2020 10:35 AM EDT -----  3-17-20  Patient still has an MRI of the brain from 12-16-19, I see that she had one scheduled on 1-13-20 but was a NO SHOW.  I mailed patient a reminder letter out on 2-27-20 but no response .  Do you still want this or d/c order?  ----- Message -----  From: SYSTEM  Sent: 3/17/2020  12:16 AM EDT  To: Dejon Dill [facility]

## 2020-07-06 ENCOUNTER — OFFICE VISIT (OUTPATIENT)
Dept: INTERNAL MEDICINE | Facility: CLINIC | Age: 45
End: 2020-07-06

## 2020-07-06 VITALS
SYSTOLIC BLOOD PRESSURE: 102 MMHG | DIASTOLIC BLOOD PRESSURE: 74 MMHG | WEIGHT: 159.13 LBS | BODY MASS INDEX: 26.51 KG/M2 | TEMPERATURE: 100 F | HEIGHT: 65 IN | RESPIRATION RATE: 20 BRPM | HEART RATE: 72 BPM

## 2020-07-06 DIAGNOSIS — K63.5 POLYP OF COLON, UNSPECIFIED PART OF COLON, UNSPECIFIED TYPE: ICD-10-CM

## 2020-07-06 DIAGNOSIS — E55.9 VITAMIN D DEFICIENCY: ICD-10-CM

## 2020-07-06 DIAGNOSIS — F41.1 GENERALIZED ANXIETY DISORDER: ICD-10-CM

## 2020-07-06 DIAGNOSIS — C50.412 MALIGNANT NEOPLASM OF UPPER-OUTER QUADRANT OF LEFT BREAST IN FEMALE, ESTROGEN RECEPTOR POSITIVE (HCC): ICD-10-CM

## 2020-07-06 DIAGNOSIS — Z13.6 SCREENING FOR CARDIOVASCULAR CONDITION: ICD-10-CM

## 2020-07-06 DIAGNOSIS — Z00.00 ENCOUNTER FOR HEALTH MAINTENANCE EXAMINATION IN ADULT: Primary | ICD-10-CM

## 2020-07-06 DIAGNOSIS — F32.89 OTHER DEPRESSION: ICD-10-CM

## 2020-07-06 DIAGNOSIS — Z17.0 MALIGNANT NEOPLASM OF UPPER-OUTER QUADRANT OF LEFT BREAST IN FEMALE, ESTROGEN RECEPTOR POSITIVE (HCC): ICD-10-CM

## 2020-07-06 PROCEDURE — 85025 COMPLETE CBC W/AUTO DIFF WBC: CPT | Performed by: INTERNAL MEDICINE

## 2020-07-06 PROCEDURE — 84443 ASSAY THYROID STIM HORMONE: CPT | Performed by: INTERNAL MEDICINE

## 2020-07-06 PROCEDURE — 82652 VIT D 1 25-DIHYDROXY: CPT | Performed by: INTERNAL MEDICINE

## 2020-07-06 PROCEDURE — 36415 COLL VENOUS BLD VENIPUNCTURE: CPT | Performed by: INTERNAL MEDICINE

## 2020-07-06 PROCEDURE — 80053 COMPREHEN METABOLIC PANEL: CPT | Performed by: INTERNAL MEDICINE

## 2020-07-06 PROCEDURE — 99396 PREV VISIT EST AGE 40-64: CPT | Performed by: INTERNAL MEDICINE

## 2020-07-06 RX ORDER — LITHIUM CARBONATE 300 MG/1
300 CAPSULE ORAL
COMMUNITY
End: 2020-11-12

## 2020-07-06 RX ORDER — PROPRANOLOL HYDROCHLORIDE 10 MG/1
10 TABLET ORAL 3 TIMES DAILY
COMMUNITY
End: 2020-08-31

## 2020-07-06 NOTE — PROGRESS NOTES
Subjective     Chief Complaint:  Physical Exam.    History of Present Illness    History obtained from the patient.       The patient has a history of left Breast Cancer.  She sees Dr. Lopez, last visit 10/24/19..     The patient sees Dr. Zhong for Depression, Anxiety, and Bipolar Disorder.  She is on Wellbutrin XL, Lamictal, and Lithium.  She is off Xanax.        She reports stable Chronic Low Back Pain.  No medication.     Colon Polyp Follow-Up: The patient is here for follow-up of Colon Polyps newly diagnosed in April 2019.    Interval events: Colonoscopy on 4/2/2019 showed 1 polyp, path consistent with tubular adenoma.    Symptoms: Has chronic constipation. Has recent stress induced diarrhea.   Denies abdominal pain,  melena, hematochezia, and changes in the stool.    Medication: None.       Vitamin D Deficiency Follow-up: The patient is here for follow-up of Vitamin D Deficiency, which is stable  Interval events: On 6/24/19,  Vitamin D level was 26.2.   Symptoms: Has occasional balance issues, not new.  Denies fatigue, myalgias, arthralgias, paresthesias, and gait instability.   Medication: Multivitamin and Vitamin D3 daily.      Maryana Love is a 44 y.o. female who presents for an Annual Physical.   She is non-fasting.    PMH, PSH, SocHx, FamHx, Allergies, and Medications: Reviewed and updated.    Outpatient Medications Prior to Visit   Medication Sig Dispense Refill   • buPROPion XL (WELLBUTRIN XL) 150 MG 24 hr tablet Take 300 mg by mouth Daily.     • lamoTRIgine (LaMICtal) 100 MG tablet Take 150 mg by mouth Daily. Take 2 tablets daily     • lithium carbonate 300 MG capsule Take 300 mg by mouth 3 (Three) Times a Day With Meals.     • propranolol (INDERAL) 10 MG tablet Take 10 mg by mouth 3 (Three) Times a Day.     • methenamine (HIPREX) 1 g tablet Take 1 g by mouth 2 (Two) Times a Day.       No facility-administered medications prior to visit.        Immunization History   Administered Date(s)  Administered   • FLUARIX/FLUZONE/AFLURIA/FLULAVAL QUAD 2019   • Tdap 2014         Patient Active Problem List   Diagnosis   • Allergic rhinitis   • Anxiety disorder   • Malignant neoplasm of upper-outer quadrant of left female breast (CMS/HCC)   • Chronic urinary tract infection   • Depression   • Hearing loss   • Edema   • Hemangioma of liver   • Cyst of ovary   • Vitamin D deficiency   • Bipolar affective disorder (CMS/HCC)   • Tremor   • Tobacco use   • Colon polyps   • Internal hemorrhoids       Health Habits:  Dental Exam. up to date  Eye Exam. up to date  Hearing Loss:  No  Exercise: 7 times/week.  Current exercise activities include: swimming and walking  Diet: Healthy  Multivitamin: Yes, every 2-3 days    Safe Driving:  Yes  Seat Belt:  Yes  Bike Helmet:  N/A  Skin Screening:  Yes  Sunscreen: Yes  SBE / VANESSA: N/A  Sexual Activity:  N/A  Birth Control:  STACI/BSO  STD Prevention:  N/A    Last Pap: , normal per patient (STACI)  Last Mammogram:  8/18/15, cat 5 (Bilateral Mastectomy)  Last DEXA Scan: N/A  Last Colonoscopy: 19- tubular adenoma  Last PSA: N/A    Social:    Social History     Socioeconomic History   • Marital status:      Spouse name: Not on file   • Number of children: 3   • Years of education: Not on file   • Highest education level: Not on file   Occupational History   • Occupation:      Comment: full time   Tobacco Use   • Smoking status: Former Smoker     Packs/day: 0.25     Years: 20.00     Pack years: 5.00     Types: Cigarettes, Electronic Cigarette     Start date:      Last attempt to quit: 2013     Years since quittin.0   • Smokeless tobacco: Never Used   • Tobacco comment: Quit intermittently for several years at a time.  Vaping daily since 12/15   Substance and Sexual Activity   • Alcohol use: Yes     Comment: half of a fifth of vodka daily x 3 months, ttrying to quit    • Drug use: Yes     Types: Marijuana     Comment: 1-2 times per month   • Sexual  activity: Yes     Partners: Male     Birth control/protection: Surgical         Current Medical Providers:    Annabelle Camargo MD (Internal Medicine / Pediatrics)    The [facility] providers who are involved in the care of this patient are listed above.         Review of Systems   Constitutional: Positive for unexpected weight change (up 6 pounds in 1 year). Negative for chills, fatigue and fever.        No night sweats.    HENT: Positive for sinus pressure (x 4-5 days, improved on Dayquil) and sinus pain (x 4-5 days, improved on Dayquil). Negative for congestion, ear pain, hearing loss, nosebleeds, postnasal drip, rhinorrhea, sneezing, sore throat, tinnitus and voice change.         Denies snoring.   Eyes: Negative for photophobia, pain, discharge, redness, itching and visual disturbance.   Respiratory: Negative for cough, chest tightness, shortness of breath, wheezing and stridor.         No chest congestion.  No hemoptysis.   Cardiovascular: Negative for chest pain, palpitations and leg swelling.        No orthopnea, MARIE, or PND.  No claudication or syncope.   Gastrointestinal: Positive for constipation and diarrhea. Negative for abdominal pain, blood in stool, nausea, rectal pain and vomiting.        No melena.  No hematemesis.  No heartburn, dysphagia or odynophagia.  No early satiety, belching, or bloating.    Endocrine: Negative for cold intolerance, heat intolerance, polydipsia, polyphagia and polyuria.        No hair loss or dry skin.  No hot flashes.     Genitourinary: Negative for difficulty urinating, dyspareunia, dysuria, flank pain, frequency, hematuria, pelvic pain, urgency, vaginal bleeding and vaginal discharge.        No nocturia, incomplete emptying, or incontinence. Increased libido. Had a boil on right labia, resolving.   Musculoskeletal: Positive for back pain and gait problem (chronic off balance). Negative for arthralgias, joint swelling, myalgias, neck pain and neck stiffness.        No  "joint stiffness.   Skin: Negative for rash.        No new skin lesions or changes in skin lesions.    Allergic/Immunologic: Positive for environmental allergies (Dayquil helps).   Neurological: Positive for tremors (chroinic). Negative for dizziness, syncope, speech difficulty, weakness, light-headedness, numbness and headaches.        No tingling.  No memory loss.  No decreased concentration.   Hematological: Negative for adenopathy. Does not bruise/bleed easily.   Psychiatric/Behavioral: Negative for confusion, sleep disturbance and suicidal ideas. The patient is nervous/anxious.         Stable depression.           Objective     Vitals:    07/06/20 1428   BP: 102/74   Pulse: 72   Resp: 20   Temp: 100 °F (37.8 °C)   TempSrc: Temporal   Weight: 72.2 kg (159 lb 2 oz)   Height: 163.8 cm (64.5\")       Body mass index is 26.89 kg/m².    Physical Exam   Constitutional:   Overweight.     HENT:   Head: Normocephalic and atraumatic.   Right Ear: Tympanic membrane, external ear and ear canal normal.   Left Ear: Tympanic membrane, external ear and ear canal normal.   Mouth/Throat: Oropharynx is clear and moist. No oral lesions.   Tonsils normal.   Eyes: Pupils are equal, round, and reactive to light. Conjunctivae and EOM are normal.   Neck: Normal range of motion. Neck supple. Carotid bruit is not present. No thyroid mass and no thyromegaly present.   Cardiovascular: Normal rate, regular rhythm, normal heart sounds and intact distal pulses. Exam reveals no gallop and no friction rub.   No murmur heard.  No peripheral edema.   Pulmonary/Chest: Effort normal and breath sounds normal.   Breast exam declined (B/L Mastectomy).   Abdominal: Soft. Bowel sounds are normal. She exhibits no distension, no abdominal bruit and no mass. There is no hepatosplenomegaly. There is no tenderness.   Genitourinary:   Genitourinary Comments:  and rectal exam deferred.   Musculoskeletal: Normal range of motion.   Lymphadenopathy:     She has no " cervical adenopathy. No inguinal adenopathy noted on the right or left side.        Right: No inguinal and no supraclavicular adenopathy present.        Left: No inguinal and no supraclavicular adenopathy present.   Neurological: She is alert. She has normal strength and normal reflexes. No cranial nerve deficit. Coordination and gait normal.   Skin: No lesion and no rash noted.   No atypical skin lesions.   Psychiatric: She has a normal mood and affect.   Nursing note and vitals reviewed.      PHQ-2 Depression Screening  Little interest or pleasure in doing things? 0   Feeling down, depressed, or hopeless? 0   PHQ-2 Total Score 0         Counseling was given to patient for the following topics:  appropriate exercise, healthy eating habits, disease prevention, risk factors for cancer, importance of immunizations, including risks and benefits, sun safety, seatbelt use and safe driving. Also discussed the importance of regular dental and vision care, as well recommendation for a yearly screening skin exam after age 40.  Written information provided to patient on these topics and other health maintenance issues.      Assessment/Plan       Maryana was seen today for annual exam and sinusitis.    Diagnoses and all orders for this visit:    Encounter for health maintenance examination in adult  -     Vitamin D 1,25 Dihydroxy  -     TSH  -     CBC & Differential  -     Comprehensive Metabolic Panel  -     CBC Auto Differential    Polyp of colon, unspecified part of colon, unspecified type   Colonoscopy up-to-date.    Vitamin D deficiency  -     Vitamin D 1,25 Dihydroxy   Continue Vitamin D supplementation.    Malignant neoplasm of upper-outer quadrant of left breast in female, estrogen receptor positive (CMS/HCC)   Follow up per Heme/Onc.    Other depression   Continue current medication(s) as noted in the history of present illness.   Follow up per Psychiatry.    Generalized anxiety disorder   Continue current medication(s)  as noted in the history of present illness.   Follow up per Psychiatry.    Screening for cardiovascular condition  -     Vitamin D 1,25 Dihydroxy  -     TSH  -     CBC & Differential  -     Comprehensive Metabolic Panel  -     CBC Auto Differential    The patient agrees to call if she would like to have a Cardiac CT Scan scheduled (information given today).      Return in about 1 year (around 7/6/2021) for Annual physical, fasting.

## 2020-07-06 NOTE — PATIENT INSTRUCTIONS
Please call if you would like to have a Cardiac CT Scan scheduled (information given today).      Health Maintenance, Female  Adopting a healthy lifestyle and getting preventive care are important in promoting health and wellness. Ask your health care provider about:  · The right schedule for you to have regular tests and exams.  · Things you can do on your own to prevent diseases and keep yourself healthy.  What should I know about diet, weight, and exercise?  Eat a healthy diet    · Eat a diet that includes plenty of vegetables, fruits, low-fat dairy products, and lean protein.  · Do not eat a lot of foods that are high in solid fats, added sugars, or sodium.  Maintain a healthy weight  Body mass index (BMI) is used to identify weight problems. It estimates body fat based on height and weight. Your health care provider can help determine your BMI and help you achieve or maintain a healthy weight.  Get regular exercise  Get regular exercise. This is one of the most important things you can do for your health. Most adults should:  · Exercise for at least 150 minutes each week. The exercise should increase your heart rate and make you sweat (moderate-intensity exercise).  · Do strengthening exercises at least twice a week. This is in addition to the moderate-intensity exercise.  · Spend less time sitting. Even light physical activity can be beneficial.  Watch cholesterol and blood lipids  Have your blood tested for lipids and cholesterol at 20 years of age, then have this test every 5 years.  Have your cholesterol levels checked more often if:  · Your lipid or cholesterol levels are high.  · You are older than 40 years of age.  · You are at high risk for heart disease.  What should I know about cancer screening?  Depending on your health history and family history, you may need to have cancer screening at various ages. This may include screening for:  · Breast cancer.  · Cervical cancer.  · Colorectal cancer.  · Skin  cancer.  · Lung cancer.  What should I know about heart disease, diabetes, and high blood pressure?  Blood pressure and heart disease  · High blood pressure causes heart disease and increases the risk of stroke. This is more likely to develop in people who have high blood pressure readings, are of  descent, or are overweight.  · Have your blood pressure checked:  ? Every 3-5 years if you are 18-39 years of age.  ? Every year if you are 40 years old or older.  Diabetes  Have regular diabetes screenings. This checks your fasting blood sugar level. Have the screening done:  · Once every three years after age 40 if you are at a normal weight and have a low risk for diabetes.  · More often and at a younger age if you are overweight or have a high risk for diabetes.  What should I know about preventing infection?  Hepatitis B  If you have a higher risk for hepatitis B, you should be screened for this virus. Talk with your health care provider to find out if you are at risk for hepatitis B infection.  Hepatitis C  Testing is recommended for:  · Everyone born from 1945 through 1965.  · Anyone with known risk factors for hepatitis C.  Sexually transmitted infections (STIs)  · Get screened for STIs, including gonorrhea and chlamydia, if:  ? You are sexually active and are younger than 24 years of age.  ? You are older than 24 years of age and your health care provider tells you that you are at risk for this type of infection.  ? Your sexual activity has changed since you were last screened, and you are at increased risk for chlamydia or gonorrhea. Ask your health care provider if you are at risk.  · Ask your health care provider about whether you are at high risk for HIV. Your health care provider may recommend a prescription medicine to help prevent HIV infection. If you choose to take medicine to prevent HIV, you should first get tested for HIV. You should then be tested every 3 months for as long as you are taking  the medicine.  Pregnancy  · If you are about to stop having your period (premenopausal) and you may become pregnant, seek counseling before you get pregnant.  · Take 400 to 800 micrograms (mcg) of folic acid every day if you become pregnant.  · Ask for birth control (contraception) if you want to prevent pregnancy.  Osteoporosis and menopause  Osteoporosis is a disease in which the bones lose minerals and strength with aging. This can result in bone fractures. If you are 65 years old or older, or if you are at risk for osteoporosis and fractures, ask your health care provider if you should:  · Be screened for bone loss.  · Take a calcium or vitamin D supplement to lower your risk of fractures.  · Be given hormone replacement therapy (HRT) to treat symptoms of menopause.  Follow these instructions at home:  Lifestyle  · Do not use any products that contain nicotine or tobacco, such as cigarettes, e-cigarettes, and chewing tobacco. If you need help quitting, ask your health care provider.  · Do not use street drugs.  · Do not share needles.  · Ask your health care provider for help if you need support or information about quitting drugs.  Alcohol use  · Do not drink alcohol if:  ? Your health care provider tells you not to drink.  ? You are pregnant, may be pregnant, or are planning to become pregnant.  · If you drink alcohol:  ? Limit how much you use to 0-1 drink a day.  ? Limit intake if you are breastfeeding.  · Be aware of how much alcohol is in your drink. In the [place] one drink equals one 12 oz bottle of beer (355 mL), one 5 oz glass of wine (148 mL), or one 1½ oz glass of hard liquor (44 mL).  General instructions  · Schedule regular health, dental, and eye exams.  · Stay current with your vaccines.  · Tell your health care provider if:  ? You often feel depressed.  ? You have ever been abused or do not feel safe at home.  Summary  · Adopting a healthy lifestyle and getting preventive care are important in  promoting health and wellness.  · Follow your health care provider's instructions about healthy diet, exercising, and getting tested or screened for diseases.  · Follow your health care provider's instructions on monitoring your cholesterol and blood pressure.  This information is not intended to replace advice given to you by your health care provider. Make sure you discuss any questions you have with your health care provider.  Document Released: 07/02/2012 Document Revised: 12/11/2019 Document Reviewed: 12/11/2019  Elsevier Patient Education © 2020 [employer]      Heart-Healthy Eating Plan  Many factors influence your heart (coronary) health, including eating and exercise habits. Coronary risk increases with abnormal blood fat (lipid) levels. Heart-healthy meal planning includes limiting unhealthy fats, increasing healthy fats, and making other diet and lifestyle changes.  What is my plan?  Your health care provider may recommend that you:  · Limit your fat intake to _________% or less of your total calories each day.  · Limit your saturated fat intake to _________% or less of your total calories each day.  · Limit the amount of cholesterol in your diet to less than _________ mg per day.  What are tips for following this plan?  Cooking  Cook foods using methods other than frying. Baking, boiling, grilling, and broiling are all good options. Other ways to reduce fat include:  · Removing the skin from poultry.  · Removing all visible fats from meats.  · Steaming vegetables in water or broth.  Meal planning    · At meals, imagine dividing your plate into fourths:  ? Fill one-half of your plate with vegetables and green salads.  ? Fill one-fourth of your plate with whole grains.  ? Fill one-fourth of your plate with lean protein foods.  · Eat 4-5 servings of vegetables per day. One serving equals 1 cup raw or cooked vegetable, or 2 cups raw leafy greens.  · Eat 4-5 servings of fruit per day. One serving equals 1  medium whole fruit, ¼ cup dried fruit, ½ cup fresh, frozen, or canned fruit, or ½ cup 100% fruit juice.  · Eat more foods that contain soluble fiber. Examples include apples, broccoli, carrots, beans, peas, and barley. Aim to get 25-30 g of fiber per day.  · Increase your consumption of legumes, nuts, and seeds to 4-5 servings per week. One serving of dried beans or legumes equals ½ cup cooked, 1 serving of nuts is ¼ cup, and 1 serving of seeds equals 1 tablespoon.  Fats  · Choose healthy fats more often. Choose monounsaturated and polyunsaturated fats, such as olive and canola oils, flaxseeds, walnuts, almonds, and seeds.  · Eat more omega-3 fats. Choose salmon, mackerel, sardines, tuna, flaxseed oil, and ground flaxseeds. Aim to eat fish at least 2 times each week.  · Check food labels carefully to identify foods with trans fats or high amounts of saturated fat.  · Limit saturated fats. These are found in animal products, such as meats, butter, and cream. Plant sources of saturated fats include palm oil, palm kernel oil, and coconut oil.  · Avoid foods with partially hydrogenated oils in them. These contain trans fats. Examples are stick margarine, some tub margarines, cookies, crackers, and other baked goods.  · Avoid fried foods.  General information  · Eat more home-cooked food and less restaurant, buffet, and fast food.  · Limit or avoid alcohol.  · Limit foods that are high in starch and sugar.  · Lose weight if you are overweight. Losing just 5-10% of your body weight can help your overall health and prevent diseases such as diabetes and heart disease.  · Monitor your salt (sodium) intake, especially if you have high blood pressure. Talk with your health care provider about your sodium intake.  · Try to incorporate more vegetarian meals weekly.  What foods can I eat?  Fruits  All fresh, canned (in natural juice), or frozen fruits.  Vegetables  Fresh or frozen vegetables (raw, steamed, roasted, or grilled).  Green salads.  Grains  Most grains. Choose whole wheat and whole grains most of the time. Rice and pasta, including brown rice and pastas made with whole wheat.  Meats and other proteins  Lean, well-trimmed beef, veal, pork, and lamb. Chicken and turkey without skin. All fish and shellfish. Wild duck, rabbit, pheasant, and venison. Egg whites or low-cholesterol egg substitutes. Dried beans, peas, lentils, and tofu. Seeds and most nuts.  Dairy  Low-fat or nonfat cheeses, including ricotta and mozzarella. Skim or 1% milk (liquid, powdered, or evaporated). Buttermilk made with low-fat milk. Nonfat or low-fat yogurt.  Fats and oils  Non-hydrogenated (trans-free) margarines. Vegetable oils, including soybean, sesame, sunflower, olive, peanut, safflower, corn, canola, and cottonseed. Salad dressings or mayonnaise made with a vegetable oil.  Beverages  Water (mineral or sparkling). Coffee and tea. Diet carbonated beverages.  Sweets and desserts  Sherbet, gelatin, and fruit ice. Small amounts of dark chocolate.  Limit all sweets and desserts.  Seasonings and condiments  All seasonings and condiments.  The items listed above may not be a complete list of foods and beverages you can eat. Contact a dietitian for more options.  What foods are not recommended?  Fruits  Canned fruit in heavy syrup. Fruit in cream or butter sauce. Fried fruit. Limit coconut.  Vegetables  Vegetables cooked in cheese, cream, or butter sauce. Fried vegetables.  Grains  Breads made with saturated or trans fats, oils, or whole milk. Croissants. Sweet rolls. Donuts. High-fat crackers, such as cheese crackers.  Meats and other proteins  Fatty meats, such as hot dogs, ribs, sausage, [place], rib-eye roast or steak. High-fat deli meats, such as salami and bologna. Caviar. Domestic duck and goose. Organ meats, such as liver.  Dairy  Cream, sour cream, cream cheese, and creamed cottage cheese. Whole milk cheeses. Whole or 2% milk (liquid, evaporated, or  condensed). Whole buttermilk. Cream sauce or high-fat cheese sauce. Whole-milk yogurt.  Fats and oils  Meat fat, or shortening. Cocoa butter, hydrogenated oils, palm oil, coconut oil, palm kernel oil. Solid fats and shortenings, including [place] fat, salt pork, lard, and butter. Nondairy cream substitutes. Salad dressings with cheese or sour cream.  Beverages  Regular sodas and any drinks with added sugar.  Sweets and desserts  Frosting. Pudding. Cookies. Cakes. Pies. Milk chocolate or white chocolate. Buttered syrups. Full-fat ice cream or ice cream drinks.  The items listed above may not be a complete list of foods and beverages to avoid. Contact a dietitian for more information.  Summary  · Heart-healthy meal planning includes limiting unhealthy fats, increasing healthy fats, and making other diet and lifestyle changes.  · Lose weight if you are overweight. Losing just 5-10% of your body weight can help your overall health and prevent diseases such as diabetes and heart disease.  · Focus on eating a balance of foods, including fruits and vegetables, low-fat or nonfat dairy, lean protein, nuts and legumes, whole grains, and heart-healthy oils and fats.  This information is not intended to replace advice given to you by your health care provider. Make sure you discuss any questions you have with your health care provider.  Document Released: 09/26/2009 Document Revised: 01/25/2019 Document Reviewed: 01/25/2019  [employer] Patient Education © 2020 [employer]      Exercising to Stay Healthy  To become healthy and stay healthy, it is recommended that you do moderate-intensity and vigorous-intensity exercise. You can tell that you are exercising at a moderate intensity if your heart starts beating faster and you start breathing faster but can still hold a conversation. You can tell that you are exercising at a vigorous intensity if you are breathing much harder and faster and cannot hold a conversation while  exercising.  Exercising regularly is important. It has many health benefits, such as:  · Improving overall fitness, flexibility, and endurance.  · Increasing bone density.  · Helping with weight control.  · Decreasing body fat.  · Increasing muscle strength.  · Reducing stress and tension.  · Improving overall health.  How often should I exercise?  Choose an activity that you enjoy, and set realistic goals. Your health care provider can help you make an activity plan that works for you.  Exercise regularly as told by your health care provider. This may include:  · Doing strength training two times a week, such as:  ? Lifting weights.  ? Using resistance bands.  ? Push-ups.  ? Sit-ups.  ? Yoga.  · Doing a certain intensity of exercise for a given amount of time. Choose from these options:  ? A total of 150 minutes of moderate-intensity exercise every week.  ? A total of 75 minutes of vigorous-intensity exercise every week.  ? A mix of moderate-intensity and vigorous-intensity exercise every week.  Children, pregnant women, people who have not exercised regularly, people who are overweight, and older adults may need to talk with a health care provider about what activities are safe to do. If you have a medical condition, be sure to talk with your health care provider before you start a new exercise program.  What are some exercise ideas?  Moderate-intensity exercise ideas include:  · Walking 1 mile (1.6 km) in about 15 minutes.  · Biking.  · Hiking.  · Golfing.  · Dancing.  · Water aerobics.  Vigorous-intensity exercise ideas include:  · Walking 4.5 miles (7.2 km) or more in about 1 hour.  · Jogging or running 5 miles (8 km) in about 1 hour.  · Biking 10 miles (16.1 km) or more in about 1 hour.  · Lap swimming.  · Roller-skating or in-line skating.  · Cross-country skiing.  · Vigorous competitive sports, such as football, basketball, and soccer.  · Jumping rope.  · Aerobic dancing.  What are some everyday activities  that can help me to get exercise?  · Yard work, such as:  ? Pushing a .  ? Raking and bagging leaves.  · Washing your car.  · Pushing a stroller.  · Shoveling snow.  · Gardening.  · Washing windows or floors.  How can I be more active in my day-to-day activities?  · Use stairs instead of an elevator.  · Take a walk during your lunch break.  · If you drive, park your car farther away from your work or school.  · If you take public transportation, get off one stop early and walk the rest of the way.  · Stand up or walk around during all of your indoor phone calls.  · Get up, stretch, and walk around every 30 minutes throughout the day.  · Enjoy exercise with a friend. Support to continue exercising will help you keep a regular routine of activity.  What guidelines can I follow while exercising?  · Before you start a new exercise program, talk with your health care provider.  · Do not exercise so much that you hurt yourself, feel dizzy, or get very short of breath.  · Wear comfortable clothes and wear shoes with good support.  · Drink plenty of water while you exercise to prevent dehydration or heat stroke.  · Work out until your breathing and your heartbeat get faster.  Where to find more information  · [place] Department of Health and Human Services: www.hhs.gov  · Centers for Disease Control and Prevention (CDC): www.cdc.gov  Summary  · Exercising regularly is important. It will improve your overall fitness, flexibility, and endurance.  · Regular exercise also will improve your overall health. It can help you control your weight, reduce stress, and improve your bone density.  · Do not exercise so much that you hurt yourself, feel dizzy, or get very short of breath.  · Before you start a new exercise program, talk with your health care provider.  This information is not intended to replace advice given to you by your health care provider. Make sure you discuss any questions you have with your health care  provider.  Document Released: 01/20/2012 Document Revised: 11/30/2018 Document Reviewed: 11/08/2018  [employer] Patient Education © 2020 [employer]      Electronic Cigarette Information    Electronic cigarettes, or e-cigarettes, are battery-operated devices that deliver nicotine--a very addictive drug--to the body. They come in many shapes, including in the shape of a cigarette, pipe, pen, and even a USB memory stick. E-cigarettes have a cartridge that contains a liquid form of nicotine. When a person uses the device, the liquid heats up. It then becomes a vapor. Inhaling this vapor is called vaping.  Nicotine is thought to increase your risk for certain types of cancer. In addition to nicotine, e-cigarettes may contain other harmful and cancer-causing chemicals, including:  · Formaldehyde.  · Acetaldehyde.  · Heavy metals.  · Ultra-fine particles that can get inhaled deep into the lungs.  · Chemical colorings and flavorings.  It is not clear how much nicotine you get when vaping, and it is hard to know what chemicals are in the vaping liquids. The health effects of vaping are not completely known, but you should be aware of the possible dangers of using these products.  Some people may use e-cigarettes in order to quit smoking tobacco. However, this has not been proven to work, and the Food and Drug Administration (FDA) has not approved e-cigarettes for this purpose.  How can using electronic cigarettes affect me?  · You may be at risk for developing a dangerous lung disease. There are reports of an increasing number of cases involving serious lung problems, and even death, associated with e-cigarette use. Your risk may be even higher if you:  ? Buy e-cigarettes or vaping oils off the street.  ? Add any substances to the e-cigarettes that are not intended by the .  · Vaping may make you crave nicotine. Nicotine does the following:  ? Changes your blood sugar levels.  ? Increases your heart rate, blood  pressure, and breathing rate.  ? Increases your risk of developing blood clots (hypercoagulable state) and diabetes.  ? Increases your risk of gum disease that may lead to losing teeth.  · If you smoke e-cigarettes, you may be more likely to start smoking or to smoke more tobacco cigarettes.  · Becoming addicted to nicotine may make your brain more sensitive to other addictive drugs. You may move to other addictive substances.  · You may be in danger of overdosing on nicotine. Nicotine poisoning can cause nausea, vomiting, seizures, and trouble breathing.  · An e-cigarette may explode and cause fires and burn injuries.  · If you are pregnant, the nicotine in e-cigarettes may be harmful to your baby. Nicotine can cause:  ? Brain or lung problems for your baby.  ? Your baby to be born too early.  ? Your baby to be born with a low birth weight.  · Vaping has also been linked to decreases in memory and attention span in children and teens.  What actions can I take to stop vaping?  If you can, stop vaping on your own before you become addicted to nicotine. If you need help quitting, ask your health care provider. There are three effective ways to fight nicotine addiction:  · Nicotine replacement therapy. Using nicotine gum or a nicotine patch blocks your craving for nicotine. Over time, you can reduce the amount of nicotine you use until you can stop using nicotine completely without having cravings.  · Prescription medicines approved to fight nicotine addiction. These stop nicotine cravings or block the effects of nicotine.  · Behavioral therapy. This may include:  ? A self-help smoking cessation program.  ? Individual or group therapy.  ? A smoking cessation support group.  There are several national programs to help you quit smoking or vaping. These include:  · Text message programs, such as SmokefreeTXT.  · Apps for mobile phones, including the free quitSTART [place].  · Hotlines, such as 1-800-QUIT-NOW  (1-670.307.8182).  Where to find support  You can get support at these sites:  · [place] Department of Health and Human Services: https://smokefree.gov  · American Lung Association: www.lung.org  Where to find more information  Learn more about e-cigarettes from:  · National [place] on Drug Abuse: www.drugabuse.gov  · Centers for Disease Control and Prevention: www.cdc.gov  Summary  · E-cigarettes can cause nicotine addiction.  · E-cigarettes are not approved as a way to stop smoking. They are not a risk-free alternative to smoking tobacco.  · There are reports of an increasing number of cases involving serious lung problems, and even death, associated with e-cigarette use.  · If you can stop vaping on your own, do it before you become addicted to nicotine. If you need help quitting, ask your health care provider.  · There are various methods and programs that can help you stop smoking or vaping.  This information is not intended to replace advice given to you by your health care provider. Make sure you discuss any questions you have with your health care provider.  Document Released: 04/10/2017 Document Revised: 04/14/2020 Document Reviewed: 02/28/2020  [employer] Patient Education © 2020 [employer]

## 2020-07-07 LAB
ALBUMIN SERPL-MCNC: 3.9 G/DL (ref 3.5–5.2)
ALBUMIN/GLOB SERPL: 1.7 G/DL
ALP SERPL-CCNC: 50 U/L (ref 39–117)
ALT SERPL W P-5'-P-CCNC: 16 U/L (ref 1–33)
ANION GAP SERPL CALCULATED.3IONS-SCNC: 9.3 MMOL/L (ref 5–15)
AST SERPL-CCNC: 22 U/L (ref 1–32)
BASOPHILS # BLD AUTO: 0.12 10*3/MM3 (ref 0–0.2)
BASOPHILS NFR BLD AUTO: 1.4 % (ref 0–1.5)
BILIRUB SERPL-MCNC: 0.2 MG/DL (ref 0–1.2)
BUN SERPL-MCNC: 7 MG/DL (ref 6–20)
BUN/CREAT SERPL: 8.8 (ref 7–25)
CALCIUM SPEC-SCNC: 9 MG/DL (ref 8.6–10.5)
CHLORIDE SERPL-SCNC: 107 MMOL/L (ref 98–107)
CO2 SERPL-SCNC: 22.7 MMOL/L (ref 22–29)
CREAT SERPL-MCNC: 0.8 MG/DL (ref 0.57–1)
DEPRECATED RDW RBC AUTO: 43 FL (ref 37–54)
EOSINOPHIL # BLD AUTO: 0.52 10*3/MM3 (ref 0–0.4)
EOSINOPHIL NFR BLD AUTO: 6.2 % (ref 0.3–6.2)
ERYTHROCYTE [DISTWIDTH] IN BLOOD BY AUTOMATED COUNT: 11.6 % (ref 12.3–15.4)
GFR SERPL CREATININE-BSD FRML MDRD: 78 ML/MIN/1.73
GLOBULIN UR ELPH-MCNC: 2.3 GM/DL
GLUCOSE SERPL-MCNC: 85 MG/DL (ref 65–99)
HCT VFR BLD AUTO: 36.2 % (ref 34–46.6)
HGB BLD-MCNC: 12.1 G/DL (ref 12–15.9)
IMM GRANULOCYTES # BLD AUTO: 0.03 10*3/MM3 (ref 0–0.05)
IMM GRANULOCYTES NFR BLD AUTO: 0.4 % (ref 0–0.5)
LYMPHOCYTES # BLD AUTO: 2.55 10*3/MM3 (ref 0.7–3.1)
LYMPHOCYTES NFR BLD AUTO: 30.3 % (ref 19.6–45.3)
MCH RBC QN AUTO: 33.5 PG (ref 26.6–33)
MCHC RBC AUTO-ENTMCNC: 33.4 G/DL (ref 31.5–35.7)
MCV RBC AUTO: 100.3 FL (ref 79–97)
MONOCYTES # BLD AUTO: 0.52 10*3/MM3 (ref 0.1–0.9)
MONOCYTES NFR BLD AUTO: 6.2 % (ref 5–12)
NEUTROPHILS NFR BLD AUTO: 4.68 10*3/MM3 (ref 1.7–7)
NEUTROPHILS NFR BLD AUTO: 55.5 % (ref 42.7–76)
NRBC BLD AUTO-RTO: 0 /100 WBC (ref 0–0.2)
PLATELET # BLD AUTO: 325 10*3/MM3 (ref 140–450)
PMV BLD AUTO: 10.6 FL (ref 6–12)
POTASSIUM SERPL-SCNC: 4 MMOL/L (ref 3.5–5.2)
PROT SERPL-MCNC: 6.2 G/DL (ref 6–8.5)
RBC # BLD AUTO: 3.61 10*6/MM3 (ref 3.77–5.28)
SODIUM SERPL-SCNC: 139 MMOL/L (ref 136–145)
TSH SERPL DL<=0.05 MIU/L-ACNC: 1.88 UIU/ML (ref 0.27–4.2)
WBC # BLD AUTO: 8.42 10*3/MM3 (ref 3.4–10.8)

## 2020-07-09 LAB — 1,25(OH)2D3 SERPL-MCNC: 109 PG/ML (ref 19.9–79.3)

## 2020-07-13 ENCOUNTER — TELEPHONE (OUTPATIENT)
Dept: INTERNAL MEDICINE | Facility: CLINIC | Age: 45
End: 2020-07-13

## 2020-07-13 NOTE — TELEPHONE ENCOUNTER
Spoke with Maryana  She is going to call back with the dose of Vitamin D that she is taking.   See lab results note.

## 2020-08-31 ENCOUNTER — APPOINTMENT (OUTPATIENT)
Dept: PREADMISSION TESTING | Facility: HOSPITAL | Age: 45
End: 2020-08-31

## 2020-08-31 LAB
ANION GAP SERPL CALCULATED.3IONS-SCNC: 9 MMOL/L (ref 5–15)
BUN SERPL-MCNC: 21 MG/DL (ref 6–20)
BUN/CREAT SERPL: 20.8 (ref 7–25)
CALCIUM SPEC-SCNC: 9.5 MG/DL (ref 8.6–10.5)
CHLORIDE SERPL-SCNC: 107 MMOL/L (ref 98–107)
CO2 SERPL-SCNC: 21 MMOL/L (ref 22–29)
CREAT SERPL-MCNC: 1.01 MG/DL (ref 0.57–1)
DEPRECATED RDW RBC AUTO: 44 FL (ref 37–54)
ERYTHROCYTE [DISTWIDTH] IN BLOOD BY AUTOMATED COUNT: 11.3 % (ref 12.3–15.4)
GFR SERPL CREATININE-BSD FRML MDRD: 59 ML/MIN/1.73
GLUCOSE SERPL-MCNC: 91 MG/DL (ref 65–99)
HCT VFR BLD AUTO: 40.9 % (ref 34–46.6)
HGB BLD-MCNC: 12.8 G/DL (ref 12–15.9)
MCH RBC QN AUTO: 33.2 PG (ref 26.6–33)
MCHC RBC AUTO-ENTMCNC: 31.3 G/DL (ref 31.5–35.7)
MCV RBC AUTO: 106 FL (ref 79–97)
PLATELET # BLD AUTO: 297 10*3/MM3 (ref 140–450)
PMV BLD AUTO: 9.6 FL (ref 6–12)
POTASSIUM SERPL-SCNC: 4.7 MMOL/L (ref 3.5–5.2)
RBC # BLD AUTO: 3.86 10*6/MM3 (ref 3.77–5.28)
SODIUM SERPL-SCNC: 137 MMOL/L (ref 136–145)
WBC # BLD AUTO: 9.27 10*3/MM3 (ref 3.4–10.8)

## 2020-08-31 PROCEDURE — 80048 BASIC METABOLIC PNL TOTAL CA: CPT | Performed by: PLASTIC SURGERY

## 2020-08-31 PROCEDURE — 85027 COMPLETE CBC AUTOMATED: CPT | Performed by: PLASTIC SURGERY

## 2020-08-31 PROCEDURE — U0002 COVID-19 LAB TEST NON-CDC: HCPCS

## 2020-08-31 PROCEDURE — U0004 COV-19 TEST NON-CDC HGH THRU: HCPCS

## 2020-08-31 PROCEDURE — 36415 COLL VENOUS BLD VENIPUNCTURE: CPT

## 2020-08-31 PROCEDURE — C9803 HOPD COVID-19 SPEC COLLECT: HCPCS

## 2020-08-31 RX ORDER — SULFAMETHOXAZOLE AND TRIMETHOPRIM 800; 160 MG/1; MG/1
1 TABLET ORAL 2 TIMES DAILY
COMMUNITY
Start: 2020-08-23 | End: 2020-09-02

## 2020-08-31 RX ORDER — TOPIRAMATE 25 MG/1
25 CAPSULE, COATED PELLETS ORAL DAILY
COMMUNITY
End: 2020-11-12

## 2020-08-31 RX ORDER — TAMOXIFEN CITRATE 20 MG/1
20 TABLET ORAL DAILY
COMMUNITY
End: 2020-11-12

## 2020-09-01 ENCOUNTER — ANESTHESIA EVENT (OUTPATIENT)
Dept: PERIOP | Facility: HOSPITAL | Age: 45
End: 2020-09-01

## 2020-09-01 LAB
REF LAB TEST METHOD: NORMAL
SARS-COV-2 RNA RESP QL NAA+PROBE: NOT DETECTED

## 2020-09-01 RX ORDER — FAMOTIDINE 10 MG/ML
20 INJECTION, SOLUTION INTRAVENOUS ONCE
Status: CANCELLED | OUTPATIENT
Start: 2020-09-01 | End: 2020-09-01

## 2020-09-02 ENCOUNTER — ANESTHESIA (OUTPATIENT)
Dept: PERIOP | Facility: HOSPITAL | Age: 45
End: 2020-09-02

## 2020-09-02 ENCOUNTER — HOSPITAL ENCOUNTER (OUTPATIENT)
Facility: HOSPITAL | Age: 45
Setting detail: SURGERY ADMIT
Discharge: HOME | End: 2020-09-02
Attending: PLASTIC SURGERY | Admitting: PLASTIC SURGERY

## 2020-09-02 VITALS
SYSTOLIC BLOOD PRESSURE: 113 MMHG | TEMPERATURE: 98.4 F | RESPIRATION RATE: 18 BRPM | WEIGHT: 150 LBS | HEART RATE: 6 BPM | DIASTOLIC BLOOD PRESSURE: 70 MMHG | HEIGHT: 65 IN | BODY MASS INDEX: 24.99 KG/M2 | OXYGEN SATURATION: 99 %

## 2020-09-02 PROCEDURE — G0463 HOSPITAL OUTPT CLINIC VISIT: HCPCS | Performed by: PLASTIC SURGERY

## 2020-09-02 RX ORDER — SODIUM CHLORIDE, SODIUM LACTATE, POTASSIUM CHLORIDE, CALCIUM CHLORIDE 600; 310; 30; 20 MG/100ML; MG/100ML; MG/100ML; MG/100ML
9 INJECTION, SOLUTION INTRAVENOUS CONTINUOUS
Status: DISCONTINUED | OUTPATIENT
Start: 2020-09-02 | End: 2020-09-02 | Stop reason: HOSPADM

## 2020-09-02 RX ORDER — LIDOCAINE HYDROCHLORIDE 10 MG/ML
0.5 INJECTION, SOLUTION EPIDURAL; INFILTRATION; INTRACAUDAL; PERINEURAL ONCE AS NEEDED
Status: COMPLETED | OUTPATIENT
Start: 2020-09-02 | End: 2020-09-02

## 2020-09-02 RX ORDER — CEFAZOLIN SODIUM 2 G/100ML
2 INJECTION, SOLUTION INTRAVENOUS ONCE
Status: DISCONTINUED | OUTPATIENT
Start: 2020-09-02 | End: 2020-09-02 | Stop reason: HOSPADM

## 2020-09-02 RX ORDER — SODIUM CHLORIDE 0.9 % (FLUSH) 0.9 %
10 SYRINGE (ML) INJECTION EVERY 12 HOURS SCHEDULED
Status: DISCONTINUED | OUTPATIENT
Start: 2020-09-02 | End: 2020-09-02 | Stop reason: HOSPADM

## 2020-09-02 RX ORDER — FAMOTIDINE 20 MG/1
20 TABLET, FILM COATED ORAL ONCE
Status: COMPLETED | OUTPATIENT
Start: 2020-09-02 | End: 2020-09-02

## 2020-09-02 RX ORDER — SODIUM CHLORIDE 0.9 % (FLUSH) 0.9 %
10 SYRINGE (ML) INJECTION AS NEEDED
Status: DISCONTINUED | OUTPATIENT
Start: 2020-09-02 | End: 2020-09-02 | Stop reason: HOSPADM

## 2020-09-02 RX ADMIN — FAMOTIDINE 20 MG: 20 TABLET, FILM COATED ORAL at 08:48

## 2020-09-02 RX ADMIN — LIDOCAINE HYDROCHLORIDE 0.2 ML: 10 INJECTION, SOLUTION EPIDURAL; INFILTRATION; INTRACAUDAL; PERINEURAL at 08:48

## 2020-09-02 RX ADMIN — SODIUM CHLORIDE, POTASSIUM CHLORIDE, SODIUM LACTATE AND CALCIUM CHLORIDE 9 ML/HR: 600; 310; 30; 20 INJECTION, SOLUTION INTRAVENOUS at 08:48

## 2020-09-02 NOTE — ANESTHESIA PREPROCEDURE EVALUATION
Anesthesia Evaluation     Patient summary reviewed and Nursing notes reviewed   NPO Solid Status: > 8 hours             Airway   Mallampati: II  TM distance: >3 FB  Neck ROM: full  No difficulty expected  Dental      Pulmonary    (+) a smoker (vapes ),   (-) asthma, shortness of breath  Cardiovascular     (-) dysrhythmias, angina      Neuro/Psych  (+) tremors, psychiatric history (wellbutrin Lithium lamictal ),     (-) seizures, CVA  GI/Hepatic/Renal/Endo    (+)   liver disease,     ROS Comment: Bariatric surgery 2015     Musculoskeletal     Abdominal    Substance History      OB/GYN          Other      history of cancer (L breast )        Phys Exam Other: Grade 1 view c Mac 3 2016                 Anesthesia Plan    ASA 2     general   (Case Cancelled per surgeon due to Vaping nicotine )  intravenous induction     Anesthetic plan, all risks, benefits, and alternatives have been provided, discussed and informed consent has been obtained with: patient.    Plan discussed with CRNA.

## 2020-09-02 NOTE — H&P
Pre-Op H&P  Maryana Love  [id number]  1975    Chief complaint: Left breast cancer- s/p bilateral mastectomy    HPI:    Patient is a 45 y.o.female who presents with a history of left breast cancer in 2005. She had a bilateral mastectomy following her cancer diagnosis. She is here today for conversion of bilateral reconstructed breasts and placement of alloderm and tissue expanders.    Review of Systems:  General ROS: negative for chills, fever or skin lesions;  No changes since last office visit.  Neg for recent sick exposure  Cardiovascular ROS: no chest pain or dyspnea on exertion  Respiratory ROS: no cough, shortness of breath, or wheezing; former cigarette smoker (1/4 ppd x 20 tears) quit 2013, currently using e-cig daily    Allergies:   Allergies   Allergen Reactions   • Nitrofurantoin Hives   • Percocet [Oxycodone-Acetaminophen] Hives       Home Meds:    No current facility-administered medications on file prior to encounter.      Current Outpatient Medications on File Prior to Encounter   Medication Sig Dispense Refill   • buPROPion XL (WELLBUTRIN XL) 150 MG 24 hr tablet Take 150 mg by mouth Daily.     • lamoTRIgine (LaMICtal) 150 MG tablet Take 150 mg by mouth 2 (Two) Times a Day. Take 2 tablets daily     • lithium carbonate 300 MG capsule Take 300 mg by mouth 3 (Three) Times a Day With Meals.         PMH:   Past Medical History:   Diagnosis Date   • Allergic rhinitis    • Anxiety disorder     Description: Long-term.   • Bipolar affective disorder (CMS/HCC)     Dx 2014- Coy.   • Chronic urinary tract infection     Description: Diagnosed 2008.  Cystoscopy normal 1/10/13, other than mild urethral inflammation. Normal diagnostic cystourethroscopy with urodynamics at  4/15.   • Colon polyps     Dx 4/19- fragments of tubular adenoma (ascending colon)- [facility]   • Cyst of ovary     Description: Left (2.5 cm) dx by CT Scan 9/6/13.   • Depression     Description: Long-term.   • Eczema    • H/O  abnormal mammogram     08/18/15-cat 5   • Hearing loss    • Hemangioma of liver     Description: Diagnosed by CT scan 9/13   • History of Papanicolaou smear of cervix 2008    normal per patient   • History of UTI     most recent august 24, 2020-taking Bactrim (only 3 doses left as of 8/31/20)   • History of varicella    • Malignant neoplasm of upper-outer quadrant of left female breast (CMS/HCC)     Description: dx 8/15- infiltrating and in situ low-grade ductal adenocarcinoma (Stage 2A, receptor +. HER2 neg)      • Tremor    • Wears contact lenses    • Wears glasses      PSH:    Past Surgical History:   Procedure Laterality Date   • ABDOMINOPLASTY     • AUGMENTATION MAMMAPLASTY     • BELPHAROPTOSIS REPAIR Bilateral approx 11/2017   • BLADDER SURGERY      6/2/15- part of mesh removed   • BREAST BIOPSY Left 08/2005    cancer   • BREAST IMPLANT SURGERY Right 6/23/2016    Procedure: RIGHT BREAST RECONSTRUCTION, REVISION;  Surgeon: José Miguel Chatman MD;  Location:  [facility];  Service:    • EYE SURGERY Right approx 10/2017    Stye removal   • FAT GRAFTING Bilateral 9/29/2016    Procedure: BILATERAL BREAST FAT GRAFTING;  Surgeon: José Miguel Chatman MD;  Location:  [facility];  Service:    • GASTRIC SLEEVE LAPAROSCOPIC      12/29/15   • LASIK Left approx 1/2018   • MASTECTOMY COMPLETE / SIMPLE W/ SENTINEL NODE BIOPSY Bilateral 10/05/2015    with reconstruction (sentinel node bx neg)   • NIPPLE RECONSTRUCTION Bilateral 9/29/2016    Procedure: BILATERAL NIPPLE/AEROLA RECONSTRUCTION WITH FLAP AND GRAFT;  Surgeon: José Miguel Chatman MD;  Location:  [facility];  Service:    • SINUS SURGERY  11/2012     endoscopic sinusotomies and polypectomies   • TOTAL ABDOMINAL HYSTERECTOMY  02/2008   • TUBAL ABDOMINAL LIGATION Bilateral 03/2008   • WISDOM TOOTH EXTRACTION           Social History:   Tobacco:   Social History     Tobacco Use   Smoking Status Former Smoker   • Packs/day: 0.25   • Years: 20.00   • Pack years: 5.00   • Types: Cigarettes,  "Electronic Cigarette   • Start date:    • Last attempt to quit: 2013   • Years since quittin.2   Smokeless Tobacco Never Used   Tobacco Comment    Quit intermittently for several years at a time.  Vaping daily since 12/15with nicotine       Alcohol:     Social History     Substance and Sexual Activity   Alcohol Use Yes   • Alcohol/week: 3.0 standard drinks   • Types: 3 Shots of liquor per week    Comment: 3-4 drinks weekly        Vitals:           /70 (BP Location: Right arm, Patient Position: Lying)   Pulse (!) 6   Temp 98.4 °F (36.9 °C) (Temporal)   Resp 18   Ht 165.1 cm (65\")   Wt 68 kg (150 lb)   LMP  (LMP Unknown) Comment: LAST MAMOGRAM 2015  SpO2 99%   BMI 24.96 kg/m²     Physical Exam:  General Appearance:    Alert, cooperative, no distress, appears stated age   Head:    Normocephalic, without obvious abnormality, atraumatic   Lungs:     Clear to auscultation bilaterally, respirations unlabored    Heart:   Regular rate and rhythm, S1 and S2 normal, no murmur, rub    or gallop    Abdomen:    Soft, non-tender, +bowel sounds   Breast Exam:    deferred   Genitalia:    deferred   Extremities:   Extremities normal, atraumatic, no cyanosis or edema   Skin:   Skin color, texture, turgor normal, no rashes or lesions   Neurologic:   Grossly intact   Results Review  LABS:  Lab Results   Component Value Date    WBC 9.27 2020    HGB 12.8 2020    HCT 40.9 2020    .0 (H) 2020     2020    NEUTROABS 4.68 2020    GLUCOSE 91 2020    BUN 21 (H) 2020    CREATININE 1.01 (H) 2020    EGFRIFNONA 59 (L) 2020     2020    K 4.7 2020     2020    CO2 21.0 (L) 2020    CALCIUM 9.5 2020    ALBUMIN 3.90 2020    AST 22 2020    ALT 16 2020    BILITOT 0.2 2020       RADIOLOGY:  Imaging Results (Last 72 Hours)     ** No results found for the last 72 hours. **          I reviewed " the patient's new clinical results.     *Pre op serum K+ pending    8/31/20 COVID-19: negative      Cancer Staging (if applicable)  Cancer Patient: _X_ yes __no __unknown; If yes, clinical stage T:__ N:__M:__, stage group or __N/A  *Left breast cancer 2005    Impression: Left breast cancer- s/p bilateral mastectomy    Plan: Conversion of bilateral reconstructed breasts and placement of alloderm and tissue expanders      Lauren Vanegas, APRN   9/2/2020   09:01     Addendum: Patient is noted to be a current everyday nicotine abuser + marijuana.  I have recommended against conversion to pre pec until she is off.  F/u 2 weeks.

## 2020-10-23 ENCOUNTER — TELEPHONE (OUTPATIENT)
Dept: INTERNAL MEDICINE | Facility: CLINIC | Age: 45
End: 2020-10-23

## 2020-10-23 DIAGNOSIS — N39.0 ACUTE UTI: Primary | ICD-10-CM

## 2020-10-23 RX ORDER — SULFAMETHOXAZOLE AND TRIMETHOPRIM 800; 160 MG/1; MG/1
1 TABLET ORAL 2 TIMES DAILY
Qty: 14 TABLET | Refills: 0 | Status: SHIPPED | OUTPATIENT
Start: 2020-10-23 | End: 2020-10-30

## 2020-10-23 NOTE — TELEPHONE ENCOUNTER
Called Maryana to schedule mychart apt. She is on her way to an oncology apt and is not able to make it.

## 2020-10-23 NOTE — TELEPHONE ENCOUNTER
Notify the patient I have sent a prescription for Bactrim to the pharmacy.  She needs to be seen in the office next week if no better.

## 2020-10-23 NOTE — TELEPHONE ENCOUNTER
Advised Pt of message. Pt verbalized good understanding and stated she will let office know if Sx worsens by Monday or Tuesday.

## 2020-10-23 NOTE — TELEPHONE ENCOUNTER
Caller: Maryana Love    Relationship: Self    Best call back number: [phone]    What medication are you requesting: BACTRIM    What are your current symptoms: PATIENT CALLED AND STATED THAT SHE IS HAVING CLOUDY, FREQUENT URINATION AND IT IS PAINFUL AND IS RUNNING A FEVER.     How long have you been experiencing symptoms: STARTED 3 OR 4 DAYS AGO     Have you had these symptoms before:    [x] Yes  [] No    Have you been treated for these symptoms before:   [x] Yes  [] No    If a prescription is needed, what is your preferred pharmacy and phone number:  [facility] [address] [place] [address] - [address] [facility] - [phone] PH     Additional notes:  PATIENT CALLED AND STATED THAT SHE IS HAVING CLOUDY, FREQUENT URINATION AND IT IS PAINFUL AND IS RUNNING A FEVER.  FEELS AS IF IT IS ANOTHER UTI AND WOULD COME IN BUT WOULD NOT PASS SCREENING WITH HER FEVER.

## 2020-11-04 ENCOUNTER — OFFICE VISIT (OUTPATIENT)
Dept: ONCOLOGY | Facility: CLINIC | Age: 45
End: 2020-11-04

## 2020-11-04 VITALS
HEIGHT: 65 IN | BODY MASS INDEX: 26.49 KG/M2 | HEART RATE: 76 BPM | DIASTOLIC BLOOD PRESSURE: 59 MMHG | SYSTOLIC BLOOD PRESSURE: 113 MMHG | WEIGHT: 159 LBS | TEMPERATURE: 98.4 F | RESPIRATION RATE: 18 BRPM | OXYGEN SATURATION: 95 %

## 2020-11-04 DIAGNOSIS — Z17.0 MALIGNANT NEOPLASM OF UPPER-OUTER QUADRANT OF LEFT BREAST IN FEMALE, ESTROGEN RECEPTOR POSITIVE (HCC): Primary | ICD-10-CM

## 2020-11-04 DIAGNOSIS — C50.412 MALIGNANT NEOPLASM OF UPPER-OUTER QUADRANT OF LEFT BREAST IN FEMALE, ESTROGEN RECEPTOR POSITIVE (HCC): Primary | ICD-10-CM

## 2020-11-04 PROCEDURE — 99213 OFFICE O/P EST LOW 20 MIN: CPT | Performed by: INTERNAL MEDICINE

## 2020-11-04 NOTE — PROGRESS NOTES
"PROBLEM LIST:  Oncology/Hematology History Overview Note   1. Stage IIA, ER/AK positive, HER-2 negative left breast cancer measuring  about 2.3 cm in largest dimension. Dx: 8/18/2015   2. Oncotype DX score of 17 placing her at low risk for recurrence.   3. Currently on tamoxifen initiated in 11/2015 - completed 5 years in 11.2020  4. Genetic testing was negative with the BreastNext panel.      Malignant neoplasm of upper-outer quadrant of left female breast (CMS/HCC)   8/18/2015 Initial Diagnosis    Malignant neoplasm of upper-outer quadrant of left female breast         REASON FOR VISIT: Stage II breast cancer with axillary adenopathy    HISTORY OF PRESENT ILLNESS:   45 y.o.  lady with stage II breast cancer presents today for follow-up.  She is currently 5 years out from her diagnosis. She completed 5 years of tamoxifen.  No significant issues ongoing.  Denies any pain.    Past medical history, social history and family history was reviewed and unchanged from prior visit.    Review of Systems:    Review of Systems   Constitutional: Negative.    HENT:  Negative.    Eyes: Negative.    Respiratory: Negative.    Cardiovascular: Negative.    Gastrointestinal: Negative.    Endocrine: Negative.    Genitourinary: Negative.     Musculoskeletal: Negative.    Skin: Negative.    Neurological: Negative.    Hematological: Negative.    Psychiatric/Behavioral: Negative.       A comprehensive 14 point review of systems was performed and was negative except as mentioned.      Medications:  The current medication list was reviewed in the EMR    ALLERGIES:    Allergies   Allergen Reactions   • Nitrofurantoin Hives   • Percocet [Oxycodone-Acetaminophen] Hives         Physical Exam    VITAL SIGNS:  /59 Comment: LUE  Pulse 76   Temp 98.4 °F (36.9 °C) (Temporal)   Resp 18   Ht 165.1 cm (65\")   Wt 72.1 kg (159 lb)   LMP  (LMP Unknown) Comment: LAST MAMOGRAM 8/2015  SpO2 95% Comment: RA  BMI 26.46 kg/m²      Performance " Status: 0    General: well appearing, in no acute distress  HEENT: sclera anicteric, oropharynx clear, neck is supple  Lymphatics:subtle small lymph node on the left  Cardiovascular: regular rate and rhythm, no murmurs, rubs or gallops  Lungs: clear to auscultation bilaterally  Abdomen: soft, nontender, nondistended.  No palpable organomegaly  Extremities: no lower extremity edema  Skin: no rashes, lesions, bruising, or petechiae  Msk:  Shows no weakness of the large muscle groups  Psych: pressured speech         RECENT LABS:    Lab Results   Component Value Date    WBC 9.27 08/31/2020    HGB 12.8 08/31/2020    HCT 40.9 08/31/2020    .0 (H) 08/31/2020     08/31/2020      Lab Results   Component Value Date    GLUCOSE 91 08/31/2020    BUN 21 (H) 08/31/2020    CREATININE 1.01 (H) 08/31/2020    EGFRIFNONA 59 (L) 08/31/2020    BCR 20.8 08/31/2020    CO2 21.0 (L) 08/31/2020    CALCIUM 9.5 08/31/2020    ALBUMIN 3.90 07/06/2020    AST 22 07/06/2020    ALT 16 07/06/2020             Assessment/Plan    1. Stage II a ER/AK positive low grade left upper quadrant breast cancer.   She is now 5 years out.  At this point she can stop her adjuvant therapy.  She does not need any further interventions.  Her risk of recurrence is fairly low.  If she does have a symptom that does not get better and 2 to 3 weeks then she needs imaging.    2.  Bipolar disorder: Better controlled for now.         Coleen Bautista MD  [facility]    11/4/2020         Please note that portions of this note may have been completed with a voice recognition program. Efforts were made to edit the dictations, but occasionally words are mistranscribed.

## 2020-11-12 ENCOUNTER — OFFICE VISIT (OUTPATIENT)
Dept: INTERNAL MEDICINE | Facility: CLINIC | Age: 45
End: 2020-11-12

## 2020-11-12 VITALS
RESPIRATION RATE: 20 BRPM | DIASTOLIC BLOOD PRESSURE: 70 MMHG | HEART RATE: 72 BPM | TEMPERATURE: 96.4 F | SYSTOLIC BLOOD PRESSURE: 114 MMHG

## 2020-11-12 DIAGNOSIS — Z23 NEED FOR IMMUNIZATION AGAINST INFLUENZA: ICD-10-CM

## 2020-11-12 DIAGNOSIS — R39.15 URGENCY OF URINATION: ICD-10-CM

## 2020-11-12 DIAGNOSIS — N39.0 URINARY TRACT INFECTION WITH HEMATURIA, SITE UNSPECIFIED: Primary | ICD-10-CM

## 2020-11-12 DIAGNOSIS — R31.9 URINARY TRACT INFECTION WITH HEMATURIA, SITE UNSPECIFIED: Primary | ICD-10-CM

## 2020-11-12 LAB
BILIRUB BLD-MCNC: NEGATIVE MG/DL
CLARITY, POC: ABNORMAL
COLOR UR: YELLOW
EXPIRATION DATE: ABNORMAL
GLUCOSE UR STRIP-MCNC: NEGATIVE MG/DL
KETONES UR QL: ABNORMAL
LEUKOCYTE EST, POC: ABNORMAL
Lab: ABNORMAL
NITRITE UR-MCNC: POSITIVE MG/ML
PH UR: 5 [PH] (ref 5–8)
PROT UR STRIP-MCNC: NEGATIVE MG/DL
RBC # UR STRIP: ABNORMAL /UL
SP GR UR: 1.02 (ref 1–1.03)
UROBILINOGEN UR QL: NORMAL

## 2020-11-12 PROCEDURE — 87077 CULTURE AEROBIC IDENTIFY: CPT | Performed by: INTERNAL MEDICINE

## 2020-11-12 PROCEDURE — 90686 IIV4 VACC NO PRSV 0.5 ML IM: CPT | Performed by: INTERNAL MEDICINE

## 2020-11-12 PROCEDURE — 87086 URINE CULTURE/COLONY COUNT: CPT | Performed by: INTERNAL MEDICINE

## 2020-11-12 PROCEDURE — 81003 URINALYSIS AUTO W/O SCOPE: CPT | Performed by: INTERNAL MEDICINE

## 2020-11-12 PROCEDURE — 90471 IMMUNIZATION ADMIN: CPT | Performed by: INTERNAL MEDICINE

## 2020-11-12 PROCEDURE — 87186 SC STD MICRODIL/AGAR DIL: CPT | Performed by: INTERNAL MEDICINE

## 2020-11-12 PROCEDURE — 99214 OFFICE O/P EST MOD 30 MIN: CPT | Performed by: INTERNAL MEDICINE

## 2020-11-12 RX ORDER — PROPRANOLOL HYDROCHLORIDE 10 MG/1
10 TABLET ORAL 3 TIMES DAILY
COMMUNITY
End: 2020-12-11

## 2020-11-12 RX ORDER — OXCARBAZEPINE 150 MG/1
150 TABLET, FILM COATED ORAL 2 TIMES DAILY
COMMUNITY
End: 2021-06-03

## 2020-11-12 RX ORDER — SULFAMETHOXAZOLE AND TRIMETHOPRIM 800; 160 MG/1; MG/1
1 TABLET ORAL 2 TIMES DAILY
Qty: 20 TABLET | Refills: 0 | Status: SHIPPED | OUTPATIENT
Start: 2020-11-12 | End: 2020-11-22

## 2020-11-12 NOTE — PROGRESS NOTES
Subjective       Maryana Love is a 45 y.o. female.     Chief Complaint   Patient presents with   • Urinary Tract Infection       History obtained from the patient.      Urinary Tract Infection   This is a new problem. Episode onset: 2-3 days ago. The problem occurs intermittently. The problem has been unchanged. The quality of the pain is described as burning. The pain is mild. There has been no fever. She is sexually active. There is no history of pyelonephritis. Associated symptoms include flank pain (right), frequency and urgency. Pertinent negatives include no chills, discharge, hematuria, nausea or vomiting. Associated symptoms comments: Has malodorous urine and incomplete emptying. She has tried increased fluids (and Azo) for the symptoms. The treatment provided mild relief. Her past medical history is significant for recurrent UTIs. There is no history of kidney stones.        The following portions of the patient's history were reviewed and updated as appropriate: allergies, current medications, past family history, past medical history, past social history, past surgical history and problem list.      Review of Systems   Constitutional: Negative for chills and fever.   Gastrointestinal: Negative for abdominal pain, diarrhea, nausea and vomiting.   Genitourinary: Positive for difficulty urinating, dysuria, flank pain (right), frequency and urgency. Negative for hematuria, pelvic pain, vaginal bleeding and vaginal discharge.   Skin: Negative for rash.   Neurological: Negative for headaches.   Hematological: Negative for adenopathy.           Objective     Blood pressure 114/70, pulse 72, temperature 96.4 °F (35.8 °C), temperature source Temporal, resp. rate 20, not currently breastfeeding.    Physical Exam  Vitals signs and nursing note reviewed.   Constitutional:       Appearance: She is well-developed and normal weight.   Cardiovascular:      Rate and Rhythm: Normal rate and regular rhythm.       Heart sounds: Normal heart sounds. No murmur.   Pulmonary:      Effort: Pulmonary effort is normal.      Breath sounds: Normal breath sounds.   Abdominal:      General: Bowel sounds are normal. There is no distension.      Palpations: Abdomen is soft. There is no hepatomegaly, splenomegaly or mass.      Tenderness: There is no abdominal tenderness. There is no right CVA tenderness or left CVA tenderness.   Skin:     Findings: No rash.   Neurological:      Mental Status: She is alert.   Psychiatric:         Mood and Affect: Mood normal.         Results for orders placed or performed in visit on 11/12/20   POC Urinalysis Dipstick, Automated    Specimen: Urine   Result Value Ref Range    Color Yellow Yellow, Straw, Dark Yellow, Birana    Clarity, UA Hazy (A) Clear    Specific Gravity  1.020 1.005 - 1.030    pH, Urine 5.0 5.0 - 8.0    Leukocytes 75 Gulshan/ul (A) Negative    Nitrite, UA Positive (A) Negative    Protein, POC Negative Negative mg/dL    Glucose, UA Negative Negative, 1000 mg/dL (3+) mg/dL    Ketones, UA 15 mg/dL (A) Negative    Urobilinogen, UA Normal Normal    Bilirubin Negative Negative    Blood, UA 50 Samson/ul (A) Negative    Lot Number [id number]     Expiration Date 12-31-20        Assessment/Plan   Diagnoses and all orders for this visit:    1. Urinary tract infection with hematuria, site unspecified (Primary)  -     Urine Culture - Urine, Urine, Clean Catch  -     sulfamethoxazole-trimethoprim (Bactrim DS) 800-160 MG per tablet; Take 1 tablet by mouth 2 (Two) Times a Day for 10 days.  Dispense: 20 tablet; Refill: 0   Continue Azo and plenty of fluids.    2. Urgency of urination  -     POC Urinalysis Dipstick, Automated    3. Need for immunization against influenza  -     Fluarix/Fluzone/Afluria Quad>6 Months        Return if symptoms worsen or fail to improve.

## 2020-11-13 ENCOUNTER — TELEPHONE (OUTPATIENT)
Dept: INTERNAL MEDICINE | Facility: CLINIC | Age: 45
End: 2020-11-13

## 2020-11-13 RX ORDER — CEPHALEXIN 500 MG/1
500 CAPSULE ORAL 4 TIMES DAILY
Qty: 40 CAPSULE | Refills: 0 | Status: SHIPPED | OUTPATIENT
Start: 2020-11-13 | End: 2020-12-11

## 2020-11-13 NOTE — TELEPHONE ENCOUNTER
Can you please review patients urine culture it grew a bacteria of Gram + Bacilli and Dr. Camargo is out of the office today .  Thanks !

## 2020-11-14 LAB — BACTERIA SPEC AEROBE CULT: ABNORMAL

## 2020-12-11 ENCOUNTER — APPOINTMENT (OUTPATIENT)
Dept: PREADMISSION TESTING | Facility: HOSPITAL | Age: 45
End: 2020-12-11

## 2020-12-11 ENCOUNTER — OFFICE VISIT (OUTPATIENT)
Dept: INTERNAL MEDICINE | Facility: CLINIC | Age: 45
End: 2020-12-11

## 2020-12-11 VITALS — WEIGHT: 165.6 LBS | HEIGHT: 65 IN | BODY MASS INDEX: 27.59 KG/M2

## 2020-12-11 VITALS
HEART RATE: 80 BPM | SYSTOLIC BLOOD PRESSURE: 108 MMHG | TEMPERATURE: 97.8 F | DIASTOLIC BLOOD PRESSURE: 72 MMHG | RESPIRATION RATE: 20 BRPM

## 2020-12-11 DIAGNOSIS — N39.0 ACUTE UTI: Primary | ICD-10-CM

## 2020-12-11 DIAGNOSIS — R30.0 BURNING WITH URINATION: ICD-10-CM

## 2020-12-11 DIAGNOSIS — B37.2 CANDIDAL DERMATITIS: ICD-10-CM

## 2020-12-11 LAB
ANION GAP SERPL CALCULATED.3IONS-SCNC: 9 MMOL/L (ref 5–15)
BILIRUB BLD-MCNC: ABNORMAL MG/DL
BUN SERPL-MCNC: 14 MG/DL (ref 6–20)
BUN/CREAT SERPL: 20.3 (ref 7–25)
CALCIUM SPEC-SCNC: 9.4 MG/DL (ref 8.6–10.5)
CHLORIDE SERPL-SCNC: 107 MMOL/L (ref 98–107)
CLARITY, POC: CLEAR
CO2 SERPL-SCNC: 24 MMOL/L (ref 22–29)
COLOR UR: YELLOW
CREAT SERPL-MCNC: 0.69 MG/DL (ref 0.57–1)
DEPRECATED RDW RBC AUTO: 42.7 FL (ref 37–54)
ERYTHROCYTE [DISTWIDTH] IN BLOOD BY AUTOMATED COUNT: 11.3 % (ref 12.3–15.4)
EXPIRATION DATE: ABNORMAL
GFR SERPL CREATININE-BSD FRML MDRD: 92 ML/MIN/1.73
GLUCOSE SERPL-MCNC: 95 MG/DL (ref 65–99)
GLUCOSE UR STRIP-MCNC: NEGATIVE MG/DL
HCT VFR BLD AUTO: 42.3 % (ref 34–46.6)
HGB BLD-MCNC: 13.1 G/DL (ref 12–15.9)
KETONES UR QL: NEGATIVE
LEUKOCYTE EST, POC: ABNORMAL
Lab: ABNORMAL
MCH RBC QN AUTO: 31.6 PG (ref 26.6–33)
MCHC RBC AUTO-ENTMCNC: 31 G/DL (ref 31.5–35.7)
MCV RBC AUTO: 102.2 FL (ref 79–97)
NITRITE UR-MCNC: POSITIVE MG/ML
PH UR: 7 [PH] (ref 5–8)
PLATELET # BLD AUTO: 262 10*3/MM3 (ref 140–450)
PMV BLD AUTO: 9.4 FL (ref 6–12)
POTASSIUM SERPL-SCNC: 4.6 MMOL/L (ref 3.5–5.2)
PROT UR STRIP-MCNC: ABNORMAL MG/DL
RBC # BLD AUTO: 4.14 10*6/MM3 (ref 3.77–5.28)
RBC # UR STRIP: ABNORMAL /UL
SODIUM SERPL-SCNC: 140 MMOL/L (ref 136–145)
SP GR UR: 1.01 (ref 1–1.03)
UROBILINOGEN UR QL: NORMAL
WBC # BLD AUTO: 7.75 10*3/MM3 (ref 3.4–10.8)

## 2020-12-11 PROCEDURE — 87077 CULTURE AEROBIC IDENTIFY: CPT | Performed by: INTERNAL MEDICINE

## 2020-12-11 PROCEDURE — 80048 BASIC METABOLIC PNL TOTAL CA: CPT

## 2020-12-11 PROCEDURE — 36415 COLL VENOUS BLD VENIPUNCTURE: CPT

## 2020-12-11 PROCEDURE — 99214 OFFICE O/P EST MOD 30 MIN: CPT | Performed by: INTERNAL MEDICINE

## 2020-12-11 PROCEDURE — 87086 URINE CULTURE/COLONY COUNT: CPT | Performed by: INTERNAL MEDICINE

## 2020-12-11 PROCEDURE — 81003 URINALYSIS AUTO W/O SCOPE: CPT | Performed by: INTERNAL MEDICINE

## 2020-12-11 PROCEDURE — 85027 COMPLETE CBC AUTOMATED: CPT

## 2020-12-11 PROCEDURE — 87186 SC STD MICRODIL/AGAR DIL: CPT | Performed by: INTERNAL MEDICINE

## 2020-12-11 RX ORDER — TOPIRAMATE 25 MG/1
25 CAPSULE, COATED PELLETS ORAL 2 TIMES DAILY
COMMUNITY
End: 2021-08-17 | Stop reason: ALTCHOICE

## 2020-12-11 RX ORDER — CEFDINIR 300 MG/1
300 CAPSULE ORAL 2 TIMES DAILY
Qty: 20 CAPSULE | Refills: 0 | Status: SHIPPED | OUTPATIENT
Start: 2020-12-11 | End: 2020-12-21

## 2020-12-11 RX ORDER — NYSTATIN 100000 U/G
OINTMENT TOPICAL 2 TIMES DAILY
Qty: 30 G | Refills: 0 | Status: SHIPPED | OUTPATIENT
Start: 2020-12-11 | End: 2020-12-21

## 2020-12-11 NOTE — PROGRESS NOTES
Subjective       Maryana Love is a 45 y.o. female.     Chief Complaint   Patient presents with   • Urinary Tract Infection   • Rash       History obtained from the patient.      Urinary Tract Infection   This is a recurrent problem. Episode onset: 4 days ago. Episode frequency: worse in the am. The problem has been gradually worsening. The quality of the pain is described as burning. Pain severity now: mild to moderate. There has been no fever. She is sexually active. There is no history of pyelonephritis. Associated symptoms include frequency and urgency. Pertinent negatives include no chills, discharge, flank pain, hematuria, nausea or vomiting. She has tried increased fluids (Azo) for the symptoms. The treatment provided mild relief. Her past medical history is significant for recurrent UTIs. There is no history of kidney stones.   Rash  This is a new problem. Episode onset: 3-4 days ago. The problem has been gradually worsening since onset. The affected locations include the genitalia. The rash is characterized by redness. She was exposed to nothing. Pertinent negatives include no congestion, cough, diarrhea, fever, joint pain, rhinorrhea, shortness of breath, sore throat or vomiting. Past treatments include nothing. There is no history of allergies, asthma or eczema.        The following portions of the patient's history were reviewed and updated as appropriate: allergies, current medications, past family history, past medical history, past social history, past surgical history and problem list.      Review of Systems   Constitutional: Negative for chills and fever.   HENT: Negative for congestion, rhinorrhea and sore throat.    Respiratory: Negative for cough and shortness of breath.    Cardiovascular: Negative for chest pain.   Gastrointestinal: Negative for abdominal pain, diarrhea, nausea and vomiting.   Genitourinary: Positive for dysuria, frequency and urgency. Negative for flank pain, hematuria,  pelvic pain, vaginal bleeding and vaginal discharge.   Musculoskeletal: Negative for arthralgias, joint pain, joint swelling and myalgias.   Skin: Positive for rash.   Neurological: Negative for headaches.   Hematological: Negative for adenopathy.           Objective     Blood pressure 108/72, pulse 80, temperature 97.8 °F (36.6 °C), temperature source Temporal, resp. rate 20, not currently breastfeeding.    Physical Exam  Vitals signs and nursing note reviewed.   Constitutional:       Appearance: She is well-developed and normal weight.   Cardiovascular:      Rate and Rhythm: Normal rate and regular rhythm.      Heart sounds: Normal heart sounds. No murmur.   Pulmonary:      Effort: Pulmonary effort is normal.      Breath sounds: Normal breath sounds.   Abdominal:      General: Bowel sounds are normal. There is no distension.      Palpations: Abdomen is soft. There is no hepatomegaly, splenomegaly or mass.      Tenderness: There is no abdominal tenderness. There is no right CVA tenderness or left CVA tenderness.   Lymphadenopathy:      Lower Body: No right inguinal adenopathy. No left inguinal adenopathy.   Skin:     Findings: Rash (erythematous rash bilateral vulva with scattered satellite lesions) present.   Neurological:      Mental Status: She is alert.   Psychiatric:         Mood and Affect: Mood normal.       Results for orders placed or performed in visit on 12/11/20   POC Urinalysis Dipstick, Automated    Specimen: Urine   Result Value Ref Range    Color Yellow Yellow, Straw, Dark Yellow, Briana    Clarity, UA Clear Clear    Specific Gravity  1.015 1.005 - 1.030    pH, Urine 7.0 5.0 - 8.0    Leukocytes 500 Gulshan/ul (A) Negative    Nitrite, UA Positive (A) Negative    Protein, POC Trace (A) Negative mg/dL    Glucose, UA Negative Negative, 1000 mg/dL (3+) mg/dL    Ketones, UA Negative Negative    Urobilinogen, UA Normal Normal    Bilirubin 1 mg/dL (A) Negative    Blood, UA 50 Samson/ul (A) Negative    Lot Number  [id number]     Expiration Date 8/31/21          Assessment/Plan   Diagnoses and all orders for this visit:    1. Acute UTI (Primary)  -     Urine Culture - Urine, Urine, Clean Catch  -     cefdinir (OMNICEF) 300 MG capsule; Take 1 capsule by mouth 2 (Two) Times a Day for 10 days.  Dispense: 20 capsule; Refill: 0    2. Candidal dermatitis  -     nystatin (MYCOSTATIN) 366453 UNIT/GM ointment; Apply  topically to the appropriate area as directed 2 (Two) Times a Day for 10 days. (Patient taking differently: Apply 1 application topically to the appropriate area as directed 2 (Two) Times a Day.)  Dispense: 30 g; Refill: 0    3. Burning with urination  -     POC Urinalysis Dipstick, Automated            Return if symptoms worsen or fail to improve.

## 2020-12-13 ENCOUNTER — APPOINTMENT (OUTPATIENT)
Dept: PREADMISSION TESTING | Facility: HOSPITAL | Age: 45
End: 2020-12-13

## 2020-12-13 LAB — BACTERIA SPEC AEROBE CULT: ABNORMAL

## 2020-12-13 PROCEDURE — C9803 HOPD COVID-19 SPEC COLLECT: HCPCS

## 2020-12-13 PROCEDURE — U0004 COV-19 TEST NON-CDC HGH THRU: HCPCS

## 2020-12-14 ENCOUNTER — ANESTHESIA EVENT (OUTPATIENT)
Dept: PERIOP | Facility: HOSPITAL | Age: 45
End: 2020-12-14

## 2020-12-14 LAB — SARS-COV-2 RNA RESP QL NAA+PROBE: NOT DETECTED

## 2020-12-14 RX ORDER — SODIUM CHLORIDE 0.9 % (FLUSH) 0.9 %
10 SYRINGE (ML) INJECTION EVERY 12 HOURS SCHEDULED
Status: CANCELLED | OUTPATIENT
Start: 2020-12-14

## 2020-12-14 RX ORDER — SODIUM CHLORIDE 0.9 % (FLUSH) 0.9 %
10 SYRINGE (ML) INJECTION AS NEEDED
Status: CANCELLED | OUTPATIENT
Start: 2020-12-14

## 2020-12-14 RX ORDER — FAMOTIDINE 20 MG/1
20 TABLET, FILM COATED ORAL ONCE
Status: CANCELLED | OUTPATIENT
Start: 2020-12-14 | End: 2020-12-14

## 2020-12-15 ENCOUNTER — ANESTHESIA (OUTPATIENT)
Dept: PERIOP | Facility: HOSPITAL | Age: 45
End: 2020-12-15

## 2020-12-15 ENCOUNTER — HOSPITAL ENCOUNTER (OUTPATIENT)
Facility: HOSPITAL | Age: 45
Setting detail: SURGERY ADMIT
Discharge: HOME | End: 2020-12-15
Attending: PLASTIC SURGERY | Admitting: PLASTIC SURGERY

## 2020-12-15 VITALS
RESPIRATION RATE: 16 BRPM | HEART RATE: 83 BPM | TEMPERATURE: 98 F | OXYGEN SATURATION: 95 % | BODY MASS INDEX: 27.49 KG/M2 | DIASTOLIC BLOOD PRESSURE: 83 MMHG | SYSTOLIC BLOOD PRESSURE: 125 MMHG | HEIGHT: 65 IN | WEIGHT: 165 LBS

## 2020-12-15 PROCEDURE — 25010000002 DEXAMETHASONE PER 1 MG: Performed by: NURSE ANESTHETIST, CERTIFIED REGISTERED

## 2020-12-15 PROCEDURE — 25010000002 ONDANSETRON PER 1 MG: Performed by: NURSE ANESTHETIST, CERTIFIED REGISTERED

## 2020-12-15 PROCEDURE — 25010000002 NEOSTIGMINE 10 MG/10ML SOLUTION: Performed by: NURSE ANESTHETIST, CERTIFIED REGISTERED

## 2020-12-15 PROCEDURE — 25010000002 FENTANYL CITRATE (PF) 100 MCG/2ML SOLUTION: Performed by: NURSE ANESTHETIST, CERTIFIED REGISTERED

## 2020-12-15 PROCEDURE — C1789 PROSTHESIS, BREAST, IMP: HCPCS | Performed by: PLASTIC SURGERY

## 2020-12-15 PROCEDURE — 25010000003 CEFAZOLIN IN DEXTROSE 2-4 GM/100ML-% SOLUTION: Performed by: PLASTIC SURGERY

## 2020-12-15 PROCEDURE — 25010000002 PROPOFOL 10 MG/ML EMULSION: Performed by: NURSE ANESTHETIST, CERTIFIED REGISTERED

## 2020-12-15 PROCEDURE — 25010000002 DIPHENHYDRAMINE PER 50 MG: Performed by: NURSE ANESTHETIST, CERTIFIED REGISTERED

## 2020-12-15 PROCEDURE — 25010000003 CEFAZOLIN PER 500 MG: Performed by: PLASTIC SURGERY

## 2020-12-15 PROCEDURE — 25010000002 PHENYLEPHRINE PER 1 ML: Performed by: NURSE ANESTHETIST, CERTIFIED REGISTERED

## 2020-12-15 PROCEDURE — 19357 TISS XPNDR PLMT BRST RCNSTJ: CPT | Performed by: PHYSICIAN ASSISTANT

## 2020-12-15 PROCEDURE — 25010000002 GENTAMICIN PER 80 MG: Performed by: PLASTIC SURGERY

## 2020-12-15 DEVICE — GRFT TISS ALLODERM RTM PERF 16X20CM 2.4MM/THK .4MM: Type: IMPLANTABLE DEVICE | Site: BREAST | Status: FUNCTIONAL

## 2020-12-15 DEVICE — EXPNDR TISS BRST F/HT V/P STL 133S FV/T 400CC: Type: IMPLANTABLE DEVICE | Site: BREAST | Status: FUNCTIONAL

## 2020-12-15 DEVICE — DEV CONTRL TISS STRATAFIX SPIRAL MNCRYL UD 3/0 PLS 30CM: Type: IMPLANTABLE DEVICE | Site: BREAST | Status: FUNCTIONAL

## 2020-12-15 RX ORDER — MAGNESIUM HYDROXIDE 1200 MG/15ML
LIQUID ORAL AS NEEDED
Status: DISCONTINUED | OUTPATIENT
Start: 2020-12-15 | End: 2020-12-15 | Stop reason: HOSPADM

## 2020-12-15 RX ORDER — DEXAMETHASONE SODIUM PHOSPHATE 4 MG/ML
INJECTION, SOLUTION INTRA-ARTICULAR; INTRALESIONAL; INTRAMUSCULAR; INTRAVENOUS; SOFT TISSUE AS NEEDED
Status: DISCONTINUED | OUTPATIENT
Start: 2020-12-15 | End: 2020-12-15 | Stop reason: SURG

## 2020-12-15 RX ORDER — DIPHENHYDRAMINE HYDROCHLORIDE 50 MG/ML
12.5 INJECTION INTRAMUSCULAR; INTRAVENOUS
Status: DISCONTINUED | OUTPATIENT
Start: 2020-12-15 | End: 2020-12-15 | Stop reason: HOSPADM

## 2020-12-15 RX ORDER — IPRATROPIUM BROMIDE AND ALBUTEROL SULFATE 2.5; .5 MG/3ML; MG/3ML
3 SOLUTION RESPIRATORY (INHALATION) ONCE AS NEEDED
Status: DISCONTINUED | OUTPATIENT
Start: 2020-12-15 | End: 2020-12-15 | Stop reason: HOSPADM

## 2020-12-15 RX ORDER — LIDOCAINE HYDROCHLORIDE 10 MG/ML
INJECTION, SOLUTION EPIDURAL; INFILTRATION; INTRACAUDAL; PERINEURAL AS NEEDED
Status: DISCONTINUED | OUTPATIENT
Start: 2020-12-15 | End: 2020-12-15 | Stop reason: SURG

## 2020-12-15 RX ORDER — PROMETHAZINE HYDROCHLORIDE 25 MG/1
25 TABLET ORAL ONCE AS NEEDED
Status: DISCONTINUED | OUTPATIENT
Start: 2020-12-15 | End: 2020-12-15 | Stop reason: HOSPADM

## 2020-12-15 RX ORDER — FENTANYL CITRATE 50 UG/ML
INJECTION, SOLUTION INTRAMUSCULAR; INTRAVENOUS AS NEEDED
Status: DISCONTINUED | OUTPATIENT
Start: 2020-12-15 | End: 2020-12-15 | Stop reason: SURG

## 2020-12-15 RX ORDER — SODIUM CHLORIDE 9 MG/ML
INJECTION, SOLUTION INTRAVENOUS AS NEEDED
Status: DISCONTINUED | OUTPATIENT
Start: 2020-12-15 | End: 2020-12-15 | Stop reason: HOSPADM

## 2020-12-15 RX ORDER — ROCURONIUM BROMIDE 10 MG/ML
INJECTION, SOLUTION INTRAVENOUS AS NEEDED
Status: DISCONTINUED | OUTPATIENT
Start: 2020-12-15 | End: 2020-12-15 | Stop reason: SURG

## 2020-12-15 RX ORDER — LIDOCAINE HYDROCHLORIDE AND EPINEPHRINE 10; 10 MG/ML; UG/ML
INJECTION, SOLUTION INFILTRATION; PERINEURAL AS NEEDED
Status: DISCONTINUED | OUTPATIENT
Start: 2020-12-15 | End: 2020-12-15 | Stop reason: HOSPADM

## 2020-12-15 RX ORDER — LABETALOL HYDROCHLORIDE 5 MG/ML
5 INJECTION, SOLUTION INTRAVENOUS
Status: DISCONTINUED | OUTPATIENT
Start: 2020-12-15 | End: 2020-12-15 | Stop reason: HOSPADM

## 2020-12-15 RX ORDER — METOCLOPRAMIDE HYDROCHLORIDE 5 MG/ML
10 INJECTION INTRAMUSCULAR; INTRAVENOUS ONCE AS NEEDED
Status: DISCONTINUED | OUTPATIENT
Start: 2020-12-15 | End: 2020-12-15 | Stop reason: HOSPADM

## 2020-12-15 RX ORDER — GLYCOPYRROLATE 0.2 MG/ML
INJECTION INTRAMUSCULAR; INTRAVENOUS AS NEEDED
Status: DISCONTINUED | OUTPATIENT
Start: 2020-12-15 | End: 2020-12-15 | Stop reason: SURG

## 2020-12-15 RX ORDER — NEOSTIGMINE METHYLSULFATE 1 MG/ML
INJECTION, SOLUTION INTRAVENOUS AS NEEDED
Status: DISCONTINUED | OUTPATIENT
Start: 2020-12-15 | End: 2020-12-15 | Stop reason: SURG

## 2020-12-15 RX ORDER — FENTANYL CITRATE 50 UG/ML
50 INJECTION, SOLUTION INTRAMUSCULAR; INTRAVENOUS
Status: DISCONTINUED | OUTPATIENT
Start: 2020-12-15 | End: 2020-12-15 | Stop reason: HOSPADM

## 2020-12-15 RX ORDER — FAMOTIDINE 10 MG/ML
20 INJECTION, SOLUTION INTRAVENOUS ONCE
Status: COMPLETED | OUTPATIENT
Start: 2020-12-15 | End: 2020-12-15

## 2020-12-15 RX ORDER — SODIUM CHLORIDE, SODIUM LACTATE, POTASSIUM CHLORIDE, CALCIUM CHLORIDE 600; 310; 30; 20 MG/100ML; MG/100ML; MG/100ML; MG/100ML
9 INJECTION, SOLUTION INTRAVENOUS CONTINUOUS
Status: DISCONTINUED | OUTPATIENT
Start: 2020-12-15 | End: 2020-12-15 | Stop reason: HOSPADM

## 2020-12-15 RX ORDER — LIDOCAINE HYDROCHLORIDE 10 MG/ML
0.5 INJECTION, SOLUTION EPIDURAL; INFILTRATION; INTRACAUDAL; PERINEURAL ONCE AS NEEDED
Status: DISCONTINUED | OUTPATIENT
Start: 2020-12-15 | End: 2020-12-15 | Stop reason: HOSPADM

## 2020-12-15 RX ORDER — ONDANSETRON 2 MG/ML
INJECTION INTRAMUSCULAR; INTRAVENOUS AS NEEDED
Status: DISCONTINUED | OUTPATIENT
Start: 2020-12-15 | End: 2020-12-15 | Stop reason: SURG

## 2020-12-15 RX ORDER — ONDANSETRON 2 MG/ML
4 INJECTION INTRAMUSCULAR; INTRAVENOUS ONCE AS NEEDED
Status: DISCONTINUED | OUTPATIENT
Start: 2020-12-15 | End: 2020-12-15 | Stop reason: HOSPADM

## 2020-12-15 RX ORDER — PROPOFOL 10 MG/ML
VIAL (ML) INTRAVENOUS AS NEEDED
Status: DISCONTINUED | OUTPATIENT
Start: 2020-12-15 | End: 2020-12-15 | Stop reason: SURG

## 2020-12-15 RX ORDER — CEFAZOLIN SODIUM 2 G/100ML
2 INJECTION, SOLUTION INTRAVENOUS ONCE
Status: COMPLETED | OUTPATIENT
Start: 2020-12-15 | End: 2020-12-15

## 2020-12-15 RX ADMIN — FENTANYL CITRATE 50 MCG: 50 INJECTION, SOLUTION INTRAMUSCULAR; INTRAVENOUS at 07:43

## 2020-12-15 RX ADMIN — CEFAZOLIN SODIUM 2 G: 2 INJECTION, SOLUTION INTRAVENOUS at 07:39

## 2020-12-15 RX ADMIN — DEXAMETHASONE SODIUM PHOSPHATE 8 MG: 4 INJECTION, SOLUTION INTRAMUSCULAR; INTRAVENOUS at 07:46

## 2020-12-15 RX ADMIN — GLYCOPYRROLATE 0.4 MG: 0.2 INJECTION INTRAMUSCULAR; INTRAVENOUS at 10:13

## 2020-12-15 RX ADMIN — PHENYLEPHRINE HYDROCHLORIDE 80 MCG: 10 INJECTION INTRAVENOUS at 08:21

## 2020-12-15 RX ADMIN — NEOSTIGMINE 3 MG: 1 INJECTION INTRAVENOUS at 10:13

## 2020-12-15 RX ADMIN — DIPHENHYDRAMINE HYDROCHLORIDE 12.5 MG: 50 INJECTION, SOLUTION INTRAMUSCULAR; INTRAVENOUS at 11:09

## 2020-12-15 RX ADMIN — FENTANYL CITRATE 50 MCG: 50 INJECTION, SOLUTION INTRAMUSCULAR; INTRAVENOUS at 07:47

## 2020-12-15 RX ADMIN — ROCURONIUM BROMIDE 50 MG: 10 INJECTION INTRAVENOUS at 07:43

## 2020-12-15 RX ADMIN — SODIUM CHLORIDE, POTASSIUM CHLORIDE, SODIUM LACTATE AND CALCIUM CHLORIDE: 600; 310; 30; 20 INJECTION, SOLUTION INTRAVENOUS at 09:50

## 2020-12-15 RX ADMIN — ONDANSETRON 4 MG: 2 INJECTION INTRAMUSCULAR; INTRAVENOUS at 10:01

## 2020-12-15 RX ADMIN — PROPOFOL 200 MG: 10 INJECTION, EMULSION INTRAVENOUS at 07:43

## 2020-12-15 RX ADMIN — SODIUM CHLORIDE, POTASSIUM CHLORIDE, SODIUM LACTATE AND CALCIUM CHLORIDE 9 ML/HR: 600; 310; 30; 20 INJECTION, SOLUTION INTRAVENOUS at 07:11

## 2020-12-15 RX ADMIN — PROPOFOL 25 MCG/KG/MIN: 10 INJECTION, EMULSION INTRAVENOUS at 07:48

## 2020-12-15 RX ADMIN — FAMOTIDINE 20 MG: 10 INJECTION, SOLUTION INTRAVENOUS at 07:11

## 2020-12-15 RX ADMIN — LIDOCAINE HYDROCHLORIDE 50 MG: 10 INJECTION, SOLUTION EPIDURAL; INFILTRATION; INTRACAUDAL; PERINEURAL at 07:43

## 2020-12-15 NOTE — OP NOTE
Preoperative diagnosis:  1.  Personal history left breast cancer  2.  Absent bilateral breast and nipple areolar complex  3.  Breast asymmetry  4.  Pain from breast implant    Postoperative diagnosis:  1.  Personal history left breast cancer  2.  Absent bilateral breast and nipple areolar complex  3.  Breast asymmetry  4.  Pain from breast implant    Surgeon: José Miguel Chatman M.D.    Assistant: Angelina BARRAZA was responsible for performing the following activities: Retraction, Suction, Irrigation, Suturing, Closing and Placing Dressing and their skilled assistance was necessary for the success of this case.    Anesthesia: General    Procedure: Bilateral conversion of a partial submuscular implant-based breast reconstruction following bilateral mastectomy for breast cancer to a prepectoralis position with placement of tissue expanders and AlloDerm.  The tissue expanders are Allergan volume 400 cc basewidth 12 cm filled with 200 cc of air.  The serial number on the right is [id number] in the serial number on the left is [id number].  The AlloDerm was 320 cm².  The lot number on the right is [id number] in the lot number on the left is [id number].    Indication: The patient is a 45-year-old white female who developed left breast cancer.  She underwent bilateral mastectomy and bilateral partial submuscular breast reconstruction.  She presents to my office complaining of generalized chest wall pain secondary to her implants in a partial submuscular plane as well as breast asymmetry.  I offered bilateral conversion of her partial submuscular breast reconstruction to a prepectoralis plane with tissue expanders and placement of AlloDerm.  She elected to pursue this option.  The technique potential complications and typical postoperative course were discussed with the patient.  She indicated her understanding and wished to proceed.    Findings:  1.  Absent breast and nipple or areolar complex    Description: The patient was taken  from preoperative holding to the operating room after informed consent was signed on the chart and placed under general anesthesia successfully.  The patient received a prophylactic dose of antibiotics and had bilateral lower extremity sequential compression devices in place and operational prior to induction of anesthesia.  She was supine on the operating table.  She had a pillow placed beneath her knees.  Her shoulders were gently abducted to 90° and she had ulnar nerve padding.  She had a Escudero catheter placed.  Her chest wall was prepped in the usual sterile fashion.  After properly identifying the patient patient's problem, her right breast was addressed first.  The nipple areolar complex reconstruction was excised in a horizontal ellipse.  The subcutaneous tissue was dissected with a combination of Metzenbaum scissors and electrocautery.  The implant was removed after performing a capsulotomy in the old pocket.  This capsulotomy was repaired with 3-0 Vicryl suture in a simple running fashion.  The remainder of by skin flaps were then elevated in a similar fashion to the level of my marks.  The pocket was copiously irrigated with an antibiotic and Betadine solution.  Hemostasis was achieved with electrocautery.  The tissue expander and AlloDerm were brought in the operative field.  It was soaked in saline for 2 minutes.  The tissue expander was placed into the subcutaneous pocket and secured at the medial lateral and superior suture tab with 2-0 silk suture.  The AlloDerm was used for soft tissue support and secured with 3-0 Vicryl suture in a horizontal mattress fashion.  2 15 [place] Tobias drains were placed in the subcutaneous space.  They were brought out the thoracoabdominal region and secured in standard fashion.  The skin was closed with 3-0 Monocryl suture in a deep dermal buried interrupted fashion.  The expander was filled with 200 cc of air.  My attention then turned to the left side.  The nipple  areolar complex reconstruction was excised in a horizontal ellipse.  The subcutaneous tissue was dissected with a combination of Metzenbaum scissors and electrocautery.  The implant was removed after performing a capsulotomy in the old pocket.  This capsulotomy was repaired with 3-0 Vicryl suture in a simple running fashion.  The remainder of by skin flaps were then elevated in a similar fashion to the level of my marks.  The pocket was copiously irrigated with an antibiotic and Betadine solution.  Hemostasis was achieved with electrocautery.  The tissue expander and AlloDerm were brought in the operative field.  It was soaked in saline for 2 minutes.  The tissue expander was placed into the subcutaneous pocket and secured at the medial lateral and superior suture tab with 2-0 silk suture.  The AlloDerm was used for soft tissue support and secured with 3-0 Vicryl suture in a horizontal mattress fashion.  2 15 [place]ian Tobias drains were placed in the subcutaneous space.  They were brought out the thoracoabdominal region and secured in standard fashion.  The skin was closed with 3-0 Monocryl suture in a deep dermal buried interrupted fashion.  The expander was filled with 200 cc of air.  Simultaneously, the skin was closed with 3-0 strata fix suture in an intracuticular fashion.  A Prevena incisional wound VAC was applied.  A surgical bra and Kerlix fluffs was also applied.  The case was then turned over to anesthesia at which point the patient was awoken from general anesthesia successfully and taken to PACU in stable condition.  I was present for the entire procedure.  All counts were correct.    Estimated blood loss: Minimal    Drains: 4    Complications: None immediate

## 2020-12-15 NOTE — ANESTHESIA PROCEDURE NOTES
Airway  Urgency: elective    Date/Time: 12/15/2020 7:44 AM  Airway not difficult    General Information and Staff    Patient location during procedure: OR  CRNA: Elvi Santana CRNA    Indications and Patient Condition  Indications for airway management: airway protection    Preoxygenated: yes  MILS not maintained throughout  Mask difficulty assessment: 1 - vent by mask    Final Airway Details  Final airway type: endotracheal airway      Successful airway: ETT  Cuffed: yes   Successful intubation technique: direct laryngoscopy  Facilitating devices/methods: intubating stylet  Endotracheal tube insertion site: oral  Blade: Geovanna  Blade size: 3  ETT size (mm): 7.0  Cormack-Lehane Classification: grade I - full view of glottis  Placement verified by: chest auscultation and capnometry   Measured from: lips  ETT/EBT  to lips (cm): 20  Number of attempts at approach: 1  Assessment: lips, teeth, and gum same as pre-op and atraumatic intubation    Additional Comments  Negative epigastric sounds, Breath sound equal bilaterally with symmetric chest rise and fall

## 2020-12-15 NOTE — BRIEF OP NOTE
BREAST RECONSTRUCTION, BREAST TISSUE EXPANDER INSERTION  Progress Note    Maryana Love  12/15/2020    Pre-op Diagnosis:   * Breast asymmetry [N64.89]     * Pain from breast implant [T85.848A]     * Personal history of breast cancer [Z85.3]     * Acquired absence of breast and absent nipple, bilateral [Z90.13]       Post-Op Diagnosis Codes:     * Breast asymmetry [N64.89]     * Pain from breast implant [T85.848A]     * Personal history of breast cancer [Z85.3]     * Acquired absence of breast and absent nipple, bilateral [Z90.13]    Procedure/CPT® Codes:        Procedure(s):  BILATERAL CONVERSION OF RECONSTRUCTED BREASTS TISSUE EXPANDER PLACEMENT    Surgeon(s):  José Miguel Chatman MD    Anesthesia: General with Block    Staff:   Circulator: Paulina Pretty RN; Vlad Lindsay RN  Scrub Person: Charu Romero  Assistant: Angelina Kwan PA  Assistant: Angelina Kwan PA      Estimated Blood Loss: minimal    Urine Voided: * No values recorded between 12/15/2020  7:37 AM and 12/15/2020 10:29 AM *    Specimens:                None          Drains:   Closed/Suction Drain Inferior;Right Breast Other (Comment) 15 Fr. (Active)       Closed/Suction Drain Inferior;Right Breast Other (Comment) 15 Fr. (Active)       Closed/Suction Drain Inferior;Left Breast Other (Comment) 15 Fr. (Active)       Closed/Suction Drain Inferior;Left Breast Other (Comment) 15 Fr. (Active)       Findings: absent breast    Complications: none immediate    Assistant: Angelina Kwan PA  was responsible for performing the following activities: Retraction, Suction, Irrigation, Suturing, Closing and Placing Dressing and their skilled assistance was necessary for the success of this case.    José Miguel Chatman MD     Date: 12/15/2020  Time: 10:29 EST

## 2020-12-15 NOTE — ANESTHESIA PREPROCEDURE EVALUATION
Anesthesia Evaluation                  Airway   Mallampati: I  TM distance: >3 FB  Neck ROM: full  No difficulty expected  Dental      Pulmonary    (+) a smoker,   Cardiovascular     ECG reviewed        Neuro/Psych  (+) psychiatric history Bipolar and Anxiety,     GI/Hepatic/Renal/Endo      Musculoskeletal     Abdominal    Substance History      OB/GYN          Other                        Anesthesia Plan    ASA 2     general     intravenous induction     Anesthetic plan, all risks, benefits, and alternatives have been provided, discussed and informed consent has been obtained with: patient.    Plan discussed with CRNA.

## 2020-12-15 NOTE — ANESTHESIA POSTPROCEDURE EVALUATION
Patient: Maryana Love    Procedure Summary     Date: 12/15/20 Room / Location:  [facility] / [facility]    Anesthesia Start: 0739 Anesthesia Stop: 1048    Procedure: BILATERAL CONVERSION OF RECONSTRUCTED BREASTS TISSUE EXPANDER PLACEMENT (Bilateral Breast) Diagnosis:       Personal history of breast cancer      Acquired absence of breast and absent nipple, bilateral      Breast asymmetry      Pain from breast implant    Surgeon: José Miguel Chatman MD Provider: Pradip Nunes MD    Anesthesia Type: general ASA Status: 2          Anesthesia Type: general    Vitals  Vitals Value Taken Time   /76 12/15/20 1048   Temp 98 °F (36.7 °C) 12/15/20 1048   Pulse 108 12/15/20 1048   Resp 14 12/15/20 1048   SpO2 100 % 12/15/20 1048           Post Anesthesia Care and Evaluation    Patient location during evaluation: PACU  Patient participation: complete - patient participated  Level of consciousness: awake and alert  Pain management: adequate  Airway patency: patent  Anesthetic complications: No anesthetic complications  PONV Status: none  Cardiovascular status: hemodynamically stable and acceptable  Respiratory status: nonlabored ventilation, acceptable and nasal cannula  Hydration status: acceptable

## 2020-12-15 NOTE — H&P
Pre-Op H&P  Maryana Love  [id number]  1975    Chief complaint: acquired absence of bilateral breasts    HPI:    Patient is a 45 y.o.female who presents today for bilateral conversion of reconstructed breasts with tissue expander placement. She had bilateral mastectomies about 5 years ago with immediate reconstruction. She notes that her right implant has been migrating recently. She denies other symptoms.     She notes having a current UTI and yeast infection for which she is on Cefdinir. She denies chest pain or shortness of breath.     Review of Systems:  General ROS: negative for chills, fever or skin lesions;  No changes since last office visit.  Neg for recent sick exposure  Cardiovascular ROS: no chest pain or dyspnea on exertion  Respiratory ROS: no cough, shortness of breath, or wheezing    Allergies:   Allergies   Allergen Reactions   • Nitrofurantoin Hives   • Percocet [Oxycodone-Acetaminophen] Hives       Home Meds:    No current facility-administered medications on file prior to encounter.      Current Outpatient Medications on File Prior to Encounter   Medication Sig Dispense Refill   • buPROPion XL (WELLBUTRIN XL) 150 MG 24 hr tablet Take 150 mg by mouth Daily.     • lamoTRIgine (LaMICtal) 150 MG tablet Take 150 mg by mouth 2 (Two) Times a Day. Take 2 tablets daily         PMH:   Past Medical History:   Diagnosis Date   • Allergic rhinitis    • Anxiety disorder     Description: Long-term.   • Bipolar affective disorder (CMS/HCC)     Dx 2014- [facility].   • Chronic urinary tract infection     Description: Diagnosed 2008.  Cystoscopy normal 1/10/13, other than mild urethral inflammation. Normal diagnostic cystourethroscopy with urodynamics at  4/15.   • Colon polyps     Dx 4/19- fragments of tubular adenoma (ascending colon)- [facility]   • Cyst of ovary     Description: Left (2.5 cm) dx by CT Scan 9/6/13.   • Depression     Description: Long-term.   • Eczema    • H/O abnormal mammogram      08/18/15-cat 5   • Hearing loss    • Hemangioma of liver     Description: Diagnosed by CT scan 9/13   • History of Papanicolaou smear of cervix 2008    normal per patient   • History of UTI     most recent august 24, 2020-taking Bactrim (only 3 doses left as of 8/31/20)   • History of varicella    • Malignant neoplasm of upper-outer quadrant of left female breast (CMS/HCC)     Description: dx 8/15- infiltrating and in situ low-grade ductal adenocarcinoma (Stage 2A, receptor +. HER2 neg)      • Tremor    • Wears contact lenses    • Wears glasses      PSH:    Past Surgical History:   Procedure Laterality Date   • ABDOMINOPLASTY     • AUGMENTATION MAMMAPLASTY     • BELPHAROPTOSIS REPAIR Bilateral approx 11/2017   • BLADDER SURGERY      6/2/15- part of mesh removed   • BREAST BIOPSY Left 08/2005    cancer   • BREAST IMPLANT SURGERY Right 6/23/2016    Procedure: RIGHT BREAST RECONSTRUCTION, REVISION;  Surgeon: José Miguel Chatman MD;  Location:  [facility];  Service:    • COSMETIC SURGERY  09/2020   • EYE SURGERY Right approx 10/2017    Stye removal   • FAT GRAFTING Bilateral 9/29/2016    Procedure: BILATERAL BREAST FAT GRAFTING;  Surgeon: José Miguel Chatman MD;  Location:  [facility];  Service:    • GASTRIC SLEEVE LAPAROSCOPIC      12/29/15   • LASIK Left approx 1/2018   • MASTECTOMY COMPLETE / SIMPLE W/ SENTINEL NODE BIOPSY Bilateral 10/05/2015    with reconstruction (sentinel node bx neg)   • NIPPLE RECONSTRUCTION Bilateral 9/29/2016    Procedure: BILATERAL NIPPLE/AEROLA RECONSTRUCTION WITH FLAP AND GRAFT;  Surgeon: José Miguel Chatman MD;  Location:  [facility];  Service:    • SINUS SURGERY  11/2012     endoscopic sinusotomies and polypectomies   • TOTAL ABDOMINAL HYSTERECTOMY  02/2008   • TUBAL ABDOMINAL LIGATION Bilateral 03/2008   • WISDOM TOOTH EXTRACTION             Social History:   Tobacco:   Social History     Tobacco Use   Smoking Status Former Smoker   • Packs/day: 0.25   • Years: 20.00   • Pack years: 5.00   • Types:  "Cigarettes, Electronic Cigarette   • Start date:    • Quit date: 2013   • Years since quittin.4   Smokeless Tobacco Never Used   Tobacco Comment    Quit intermittently for several years at a time.  Vaping daily since 12/15with nicotine       Alcohol:     Social History     Substance and Sexual Activity   Alcohol Use Yes   • Alcohol/week: 3.0 standard drinks   • Types: 3 Shots of liquor per week    Comment: 3-4 drinks weekly        Vitals:           /66 (BP Location: Right arm, Patient Position: Lying)   Pulse 76   Temp 98.6 °F (37 °C) (Tympanic)   Resp 18   Ht 165.1 cm (65\")   Wt 74.8 kg (165 lb)   LMP  (LMP Unknown) Comment: LAST MAMOGRAM 2015  SpO2 98%   BMI 27.46 kg/m²     Physical Exam:  General Appearance:    Alert, cooperative, no distress, appears stated age   Head:    Normocephalic, without obvious abnormality, atraumatic   Lungs:     Clear to auscultation bilaterally, respirations unlabored    Heart:   Regular rate and rhythm, S1 and S2 normal, no murmur, rub    or gallop    Abdomen:    Soft, nontender.    Breast Exam:    deferred   Genitalia:    deferred   Extremities:   Extremities normal, atraumatic, no cyanosis or edema   Skin:   Skin color, texture, turgor normal, no rashes or lesions   Neurologic:   Grossly intact   Results Review  LABS:  Lab Results   Component Value Date    WBC 7.75 2020    HGB 13.1 2020    HCT 42.3 2020    .2 (H) 2020     2020    NEUTROABS 4.68 2020    GLUCOSE 95 2020    BUN 14 2020    CREATININE 0.69 2020    EGFRIFNONA 92 2020     2020    K 4.6 2020     2020    CO2 24.0 2020    CALCIUM 9.4 2020    ALBUMIN 3.90 2020    AST 22 2020    ALT 16 2020    BILITOT 0.2 2020       RADIOLOGY:  No radiology results for the last 3 days     I reviewed the patient's new clinical results.    Impression:   1. Acquired absence of " bilateral breasts  2. Personal history of left breast cancer    Plan:   1. Bilateral conversion of reconstructed breasts with tissue expander placement   Please see office note for full details.     CHRISTI Romero   12/15/2020   07:02 EST

## 2021-01-15 ENCOUNTER — TELEPHONE (OUTPATIENT)
Dept: INTERNAL MEDICINE | Facility: CLINIC | Age: 46
End: 2021-01-15

## 2021-01-15 DIAGNOSIS — N39.0 ACUTE UTI: Primary | ICD-10-CM

## 2021-01-15 RX ORDER — CEFDINIR 300 MG/1
300 CAPSULE ORAL 2 TIMES DAILY
Qty: 20 CAPSULE | Refills: 0 | Status: SHIPPED | OUTPATIENT
Start: 2021-01-15 | End: 2021-01-25

## 2021-01-15 NOTE — TELEPHONE ENCOUNTER
Caller: Maryana Love    Relationship to patient: Self    Best call back number: [phone]    Concerns or Questions if Applicable: PATIENT HAS BEEN EXPOSED TWICE THIS WEEK TO COVD; SHE IS ONLY HAVING A SLIGHT FEVER; ALSO PATIENT THINKS SHE HAS UTI: FREQUENCY, VOIDANCE, FOUL SMELLING URINE; WOULD LIKE THE SAME MEDS CALLED IN FROM LAST TIME    JENNIFER HERRERA [facility]

## 2021-01-15 NOTE — TELEPHONE ENCOUNTER
Prescription for cefdinir sent to the pharmacy, but the patient needs to be seen if she does not improve.  I would recommend she get tested for Covid.

## 2021-02-08 ENCOUNTER — OFFICE VISIT (OUTPATIENT)
Dept: INTERNAL MEDICINE | Facility: CLINIC | Age: 46
End: 2021-02-08

## 2021-02-08 VITALS
HEIGHT: 65 IN | BODY MASS INDEX: 27.46 KG/M2 | DIASTOLIC BLOOD PRESSURE: 84 MMHG | OXYGEN SATURATION: 96 % | SYSTOLIC BLOOD PRESSURE: 112 MMHG | RESPIRATION RATE: 20 BRPM | HEART RATE: 80 BPM | TEMPERATURE: 97.8 F

## 2021-02-08 DIAGNOSIS — N76.0 ACUTE VAGINITIS: ICD-10-CM

## 2021-02-08 DIAGNOSIS — N39.0 ACUTE UTI: Primary | ICD-10-CM

## 2021-02-08 DIAGNOSIS — R39.198 DIFFICULTY URINATING: ICD-10-CM

## 2021-02-08 LAB
BILIRUB BLD-MCNC: ABNORMAL MG/DL
CLARITY, POC: ABNORMAL
COLOR UR: ABNORMAL
EXPIRATION DATE: ABNORMAL
GLUCOSE UR STRIP-MCNC: NEGATIVE MG/DL
KETONES UR QL: NEGATIVE
LEUKOCYTE EST, POC: ABNORMAL
Lab: ABNORMAL
NITRITE UR-MCNC: POSITIVE MG/ML
PH UR: 7 [PH] (ref 5–8)
PROT UR STRIP-MCNC: ABNORMAL MG/DL
RBC # UR STRIP: ABNORMAL /UL
SP GR UR: 1.01 (ref 1–1.03)
UROBILINOGEN UR QL: ABNORMAL

## 2021-02-08 PROCEDURE — 99214 OFFICE O/P EST MOD 30 MIN: CPT | Performed by: INTERNAL MEDICINE

## 2021-02-08 PROCEDURE — 87186 SC STD MICRODIL/AGAR DIL: CPT | Performed by: INTERNAL MEDICINE

## 2021-02-08 PROCEDURE — 87088 URINE BACTERIA CULTURE: CPT | Performed by: INTERNAL MEDICINE

## 2021-02-08 PROCEDURE — 81003 URINALYSIS AUTO W/O SCOPE: CPT | Performed by: INTERNAL MEDICINE

## 2021-02-08 PROCEDURE — 87086 URINE CULTURE/COLONY COUNT: CPT | Performed by: INTERNAL MEDICINE

## 2021-02-08 RX ORDER — CEFDINIR 300 MG/1
300 CAPSULE ORAL 2 TIMES DAILY
Qty: 20 CAPSULE | Refills: 0 | Status: SHIPPED | OUTPATIENT
Start: 2021-02-08 | End: 2021-02-18

## 2021-02-08 RX ORDER — FLUCONAZOLE 150 MG/1
150 TABLET ORAL ONCE
Qty: 2 TABLET | Refills: 0 | Status: SHIPPED | OUTPATIENT
Start: 2021-02-08 | End: 2021-02-08

## 2021-02-08 NOTE — PROGRESS NOTES
"Subjective       Maryana Love is a 45 y.o. female.     Chief Complaint   Patient presents with   • Difficulty Urinating       History obtained from the patient.      Urinary Tract Infection   This is a new problem. Episode onset: 3 days ago. The problem has been gradually worsening. The quality of the pain is described as burning. The pain is mild. There has been no fever. She is sexually active. There is no history of pyelonephritis. Associated symptoms include frequency and urgency. Pertinent negatives include no chills, discharge, flank pain, hematuria, nausea or vomiting. Associated symptoms comments: Reports nocturia, incomplete emptying, and malodorous urine  . Treatments tried: Pyridium. The treatment provided mild relief. Her past medical history is significant for recurrent UTIs. There is no history of kidney stones.        The following portions of the patient's history were reviewed and updated as appropriate: allergies, current medications, past family history, past medical history, past social history, past surgical history and problem list.      Review of Systems   Constitutional: Positive for fatigue. Negative for chills and fever.   Gastrointestinal: Negative for abdominal pain, diarrhea, nausea and vomiting.   Endocrine: Positive for polydipsia.   Genitourinary: Positive for dysuria, frequency and urgency. Negative for flank pain, hematuria, vaginal bleeding and vaginal discharge (but has some itching).   Musculoskeletal: Negative for arthralgias and myalgias.   Skin: Negative for rash.   Neurological: Negative for headaches.   Hematological: Negative for adenopathy.           Objective     Blood pressure 112/84, pulse 80, temperature 97.8 °F (36.6 °C), temperature source Temporal, resp. rate 20, height 165.1 cm (65\"), SpO2 96 %, not currently breastfeeding.    Physical Exam  Vitals signs and nursing note reviewed.   Constitutional:       Appearance: She is well-developed and normal weight. "   Cardiovascular:      Rate and Rhythm: Normal rate and regular rhythm.      Heart sounds: Normal heart sounds. No murmur.   Pulmonary:      Effort: Pulmonary effort is normal.      Breath sounds: Normal breath sounds.   Abdominal:      General: Bowel sounds are normal. There is no distension.      Palpations: Abdomen is soft. There is no hepatomegaly, splenomegaly or mass.      Tenderness: There is no abdominal tenderness. There is no right CVA tenderness or left CVA tenderness.   Skin:     Findings: No rash.   Neurological:      Mental Status: She is alert.   Psychiatric:         Mood and Affect: Mood normal.       Results for orders placed or performed in visit on 02/08/21   POC Urinalysis Dipstick, Automated    Specimen: Urine   Result Value Ref Range    Color Orange (A) Yellow, Straw, Dark Yellow, Briana    Clarity, UA Hazy (A) Clear    Specific Gravity  1.010 1.005 - 1.030    pH, Urine 7.0 5.0 - 8.0    Leukocytes 500 Gulshan/ul (A) Negative    Nitrite, UA Positive (A) Negative    Protein,  mg/dL (A) Negative mg/dL    Glucose, UA Negative Negative, 1000 mg/dL (3+) mg/dL    Ketones, UA Negative Negative    Urobilinogen, UA 12 E.U./dL  (A) Normal    Bilirubin 6 mg/dL (A) Negative    Blood, UA 50 Samson/ul (A) Negative    Lot Number [id number]     Expiration Date 8-31-21            Assessment/Plan   Diagnoses and all orders for this visit:    1. Acute UTI (Primary)  -     Urine Culture - Urine, Urine, Clean Catch  -     cefdinir (OMNICEF) 300 MG capsule; Take 1 capsule by mouth 2 (Two) Times a Day for 10 days.  Dispense: 20 capsule; Refill: 0    2. Difficulty urinating  -     POC Urinalysis Dipstick, Automated    3. Acute vaginitis  -     fluconazole (Diflucan) 150 MG tablet; Take 1 tablet by mouth 1 (One) Time for 1 dose. May repeat after 4 days.  Dispense: 2 tablet; Refill: 0        Recommended plenty of fluids and continue Pyridium.    Return if symptoms worsen or fail to improve.

## 2021-02-09 ENCOUNTER — TELEPHONE (OUTPATIENT)
Dept: ONCOLOGY | Facility: CLINIC | Age: 46
End: 2021-02-09

## 2021-02-09 NOTE — TELEPHONE ENCOUNTER
"Patient called triage line. Stating she has had a couple of lymphnodes to come up in last few days. Stating however she thinks it's related to a UTI that she has. Patient stating she is not on antibiotics. Requesting a call back.    Upon returning call to patient read  note that states if patient has raised area for 1-2 weeks he would scan her. Upon calling patient back. She states that her lymph nodes have been enlarged for 2 days however she believes it's related to her UTI and can tell they are decreasing. Patient stating \" I'm not really worried about it I think it's going to be ok.\"Informing patient that nurse will discuss with  in am and if he is concerned and wants to do scans nurse would call and let her know. Patient stating she understood.   "

## 2021-02-10 ENCOUNTER — TELEPHONE (OUTPATIENT)
Dept: INTERNAL MEDICINE | Facility: CLINIC | Age: 46
End: 2021-02-10

## 2021-02-10 DIAGNOSIS — Z16.12 UTI DUE TO EXTENDED-SPECTRUM BETA LACTAMASE (ESBL) PRODUCING ESCHERICHIA COLI: Primary | ICD-10-CM

## 2021-02-10 DIAGNOSIS — B96.29 UTI DUE TO EXTENDED-SPECTRUM BETA LACTAMASE (ESBL) PRODUCING ESCHERICHIA COLI: Primary | ICD-10-CM

## 2021-02-10 DIAGNOSIS — N39.0 UTI DUE TO EXTENDED-SPECTRUM BETA LACTAMASE (ESBL) PRODUCING ESCHERICHIA COLI: Primary | ICD-10-CM

## 2021-02-10 LAB — BACTERIA SPEC AEROBE CULT: ABNORMAL

## 2021-02-10 NOTE — TELEPHONE ENCOUNTER
Pt wanted to let Dr. Camargo know that her antibiotics are not working and she stated she would like different ones. Please advise

## 2021-02-10 NOTE — TELEPHONE ENCOUNTER
Call patient please.  Her culture grew a multi resistant bacteria.  Nitrofurantoin will work for it, but she is allergic to that.  Therefore, I would recommend an ID consult to determine treatment.  If she is agreeable, I will enter an order.

## 2021-02-10 NOTE — TELEPHONE ENCOUNTER
Maryana Love [phone]  Spoke to pt, advised of clinical message. Pt's in agreement with plan to see ID. Advised as soon as referral is placed if placed stat they will contact her in order to scheduled the appointment at her convenience. Good verbal understanding.     Please advise?

## 2021-02-18 ENCOUNTER — TRANSCRIBE ORDERS (OUTPATIENT)
Dept: LAB | Facility: HOSPITAL | Age: 46
End: 2021-02-18

## 2021-02-18 ENCOUNTER — LAB (OUTPATIENT)
Dept: LAB | Facility: HOSPITAL | Age: 46
End: 2021-02-18

## 2021-02-18 DIAGNOSIS — N39.0 URINARY TRACT INFECTION WITHOUT HEMATURIA, SITE UNSPECIFIED: ICD-10-CM

## 2021-02-18 DIAGNOSIS — N39.0 URINARY TRACT INFECTION WITHOUT HEMATURIA, SITE UNSPECIFIED: Primary | ICD-10-CM

## 2021-02-18 PROCEDURE — 87086 URINE CULTURE/COLONY COUNT: CPT

## 2021-02-18 PROCEDURE — 87186 SC STD MICRODIL/AGAR DIL: CPT

## 2021-02-18 PROCEDURE — 87088 URINE BACTERIA CULTURE: CPT

## 2021-02-19 ENCOUNTER — TRANSCRIBE ORDERS (OUTPATIENT)
Dept: LAB | Facility: HOSPITAL | Age: 46
End: 2021-02-19

## 2021-02-19 ENCOUNTER — LAB (OUTPATIENT)
Dept: LAB | Facility: HOSPITAL | Age: 46
End: 2021-02-19

## 2021-02-19 DIAGNOSIS — N39.0 URINARY TRACT INFECTION WITHOUT HEMATURIA, SITE UNSPECIFIED: Primary | ICD-10-CM

## 2021-02-19 DIAGNOSIS — B96.29 NON-SHIGA TOXIN-PRODUCING E. COLI: ICD-10-CM

## 2021-02-19 DIAGNOSIS — Z16.12 INFECTION DUE TO EXTENDED SPECTRUM BETA-LACTAMASE PRODUCING BACTERIA: ICD-10-CM

## 2021-02-19 DIAGNOSIS — N39.0 URINARY TRACT INFECTION WITHOUT HEMATURIA, SITE UNSPECIFIED: ICD-10-CM

## 2021-02-19 DIAGNOSIS — A49.9 INFECTION DUE TO EXTENDED SPECTRUM BETA-LACTAMASE PRODUCING BACTERIA: ICD-10-CM

## 2021-02-19 LAB
ALBUMIN SERPL-MCNC: 3.9 G/DL (ref 3.5–5.2)
ALBUMIN/GLOB SERPL: 1.4 G/DL
ALP SERPL-CCNC: 103 U/L (ref 39–117)
ALT SERPL W P-5'-P-CCNC: 17 U/L (ref 1–33)
ANION GAP SERPL CALCULATED.3IONS-SCNC: 9 MMOL/L (ref 5–15)
AST SERPL-CCNC: 27 U/L (ref 1–32)
BACTERIA UR QL AUTO: ABNORMAL /HPF
BILIRUB SERPL-MCNC: 0.2 MG/DL (ref 0–1.2)
BILIRUB UR QL STRIP: NEGATIVE
BUN SERPL-MCNC: 15 MG/DL (ref 6–20)
BUN/CREAT SERPL: 21.1 (ref 7–25)
CALCIUM SPEC-SCNC: 8.8 MG/DL (ref 8.6–10.5)
CHLORIDE SERPL-SCNC: 104 MMOL/L (ref 98–107)
CLARITY UR: CLEAR
CO2 SERPL-SCNC: 28 MMOL/L (ref 22–29)
COLOR UR: YELLOW
CREAT SERPL-MCNC: 0.71 MG/DL (ref 0.57–1)
DEPRECATED RDW RBC AUTO: 46.2 FL (ref 37–54)
ERYTHROCYTE [DISTWIDTH] IN BLOOD BY AUTOMATED COUNT: 12.6 % (ref 12.3–15.4)
GFR SERPL CREATININE-BSD FRML MDRD: 89 ML/MIN/1.73
GLOBULIN UR ELPH-MCNC: 2.7 GM/DL
GLUCOSE SERPL-MCNC: 100 MG/DL (ref 65–99)
GLUCOSE UR STRIP-MCNC: NEGATIVE MG/DL
HCT VFR BLD AUTO: 41.7 % (ref 34–46.6)
HGB BLD-MCNC: 13.4 G/DL (ref 12–15.9)
HGB UR QL STRIP.AUTO: NEGATIVE
HYALINE CASTS UR QL AUTO: ABNORMAL /LPF
KETONES UR QL STRIP: NEGATIVE
LEUKOCYTE ESTERASE UR QL STRIP.AUTO: ABNORMAL
MCH RBC QN AUTO: 32.1 PG (ref 26.6–33)
MCHC RBC AUTO-ENTMCNC: 32.1 G/DL (ref 31.5–35.7)
MCV RBC AUTO: 100 FL (ref 79–97)
NITRITE UR QL STRIP: NEGATIVE
PH UR STRIP.AUTO: 7.5 [PH] (ref 5–8)
PLATELET # BLD AUTO: 311 10*3/MM3 (ref 140–450)
PMV BLD AUTO: 9.7 FL (ref 6–12)
POTASSIUM SERPL-SCNC: 4.2 MMOL/L (ref 3.5–5.2)
PROT SERPL-MCNC: 6.6 G/DL (ref 6–8.5)
PROT UR QL STRIP: ABNORMAL
RBC # BLD AUTO: 4.17 10*6/MM3 (ref 3.77–5.28)
RBC # UR: ABNORMAL /HPF
REF LAB TEST METHOD: ABNORMAL
SODIUM SERPL-SCNC: 141 MMOL/L (ref 136–145)
SP GR UR STRIP: 1.02 (ref 1–1.03)
SQUAMOUS #/AREA URNS HPF: ABNORMAL /HPF
UROBILINOGEN UR QL STRIP: ABNORMAL
WBC # BLD AUTO: 7.69 10*3/MM3 (ref 3.4–10.8)
WBC UR QL AUTO: ABNORMAL /HPF

## 2021-02-19 PROCEDURE — 80053 COMPREHEN METABOLIC PANEL: CPT

## 2021-02-19 PROCEDURE — 36415 COLL VENOUS BLD VENIPUNCTURE: CPT

## 2021-02-19 PROCEDURE — 81001 URINALYSIS AUTO W/SCOPE: CPT

## 2021-02-19 PROCEDURE — 87086 URINE CULTURE/COLONY COUNT: CPT

## 2021-02-19 PROCEDURE — 85027 COMPLETE CBC AUTOMATED: CPT

## 2021-02-20 LAB
BACTERIA SPEC AEROBE CULT: ABNORMAL
BACTERIA SPEC AEROBE CULT: NORMAL

## 2021-02-25 ENCOUNTER — TRANSCRIBE ORDERS (OUTPATIENT)
Dept: LAB | Facility: HOSPITAL | Age: 46
End: 2021-02-25

## 2021-02-25 ENCOUNTER — LAB (OUTPATIENT)
Dept: LAB | Facility: HOSPITAL | Age: 46
End: 2021-02-25

## 2021-02-25 DIAGNOSIS — A49.9 INFECTION DUE TO EXTENDED SPECTRUM BETA-LACTAMASE PRODUCING BACTERIA: Primary | ICD-10-CM

## 2021-02-25 DIAGNOSIS — A49.9 INFECTION DUE TO EXTENDED SPECTRUM BETA-LACTAMASE PRODUCING BACTERIA: ICD-10-CM

## 2021-02-25 DIAGNOSIS — N39.0 URINARY TRACT INFECTION WITHOUT HEMATURIA, SITE UNSPECIFIED: ICD-10-CM

## 2021-02-25 DIAGNOSIS — Z16.12 INFECTION DUE TO EXTENDED SPECTRUM BETA-LACTAMASE PRODUCING BACTERIA: Primary | ICD-10-CM

## 2021-02-25 DIAGNOSIS — B96.29 NON-SHIGA TOXIN-PRODUCING E. COLI: ICD-10-CM

## 2021-02-25 DIAGNOSIS — Z16.12 INFECTION DUE TO EXTENDED SPECTRUM BETA-LACTAMASE PRODUCING BACTERIA: ICD-10-CM

## 2021-02-25 LAB
ALBUMIN SERPL-MCNC: 3.8 G/DL (ref 3.5–5.2)
ALBUMIN/GLOB SERPL: 1.4 G/DL
ALP SERPL-CCNC: 92 U/L (ref 39–117)
ALT SERPL W P-5'-P-CCNC: 18 U/L (ref 1–33)
ANION GAP SERPL CALCULATED.3IONS-SCNC: 5 MMOL/L (ref 5–15)
AST SERPL-CCNC: 32 U/L (ref 1–32)
BILIRUB SERPL-MCNC: 0.2 MG/DL (ref 0–1.2)
BUN SERPL-MCNC: 10 MG/DL (ref 6–20)
BUN/CREAT SERPL: 12.7 (ref 7–25)
CALCIUM SPEC-SCNC: 9.2 MG/DL (ref 8.6–10.5)
CHLORIDE SERPL-SCNC: 103 MMOL/L (ref 98–107)
CO2 SERPL-SCNC: 30 MMOL/L (ref 22–29)
CREAT SERPL-MCNC: 0.79 MG/DL (ref 0.57–1)
DEPRECATED RDW RBC AUTO: 45.4 FL (ref 37–54)
ERYTHROCYTE [DISTWIDTH] IN BLOOD BY AUTOMATED COUNT: 12.6 % (ref 12.3–15.4)
GFR SERPL CREATININE-BSD FRML MDRD: 79 ML/MIN/1.73
GLOBULIN UR ELPH-MCNC: 2.8 GM/DL
GLUCOSE SERPL-MCNC: 101 MG/DL (ref 65–99)
HCT VFR BLD AUTO: 40.9 % (ref 34–46.6)
HGB BLD-MCNC: 13.3 G/DL (ref 12–15.9)
MCH RBC QN AUTO: 32.3 PG (ref 26.6–33)
MCHC RBC AUTO-ENTMCNC: 32.5 G/DL (ref 31.5–35.7)
MCV RBC AUTO: 99.3 FL (ref 79–97)
PLATELET # BLD AUTO: 218 10*3/MM3 (ref 140–450)
PMV BLD AUTO: 10 FL (ref 6–12)
POTASSIUM SERPL-SCNC: 3.9 MMOL/L (ref 3.5–5.2)
PROT SERPL-MCNC: 6.6 G/DL (ref 6–8.5)
RBC # BLD AUTO: 4.12 10*6/MM3 (ref 3.77–5.28)
SODIUM SERPL-SCNC: 138 MMOL/L (ref 136–145)
WBC # BLD AUTO: 6.17 10*3/MM3 (ref 3.4–10.8)

## 2021-02-25 PROCEDURE — 80053 COMPREHEN METABOLIC PANEL: CPT

## 2021-02-25 PROCEDURE — 85027 COMPLETE CBC AUTOMATED: CPT

## 2021-02-25 PROCEDURE — 36415 COLL VENOUS BLD VENIPUNCTURE: CPT

## 2021-03-01 ENCOUNTER — TRANSCRIBE ORDERS (OUTPATIENT)
Dept: LAB | Facility: HOSPITAL | Age: 46
End: 2021-03-01

## 2021-03-01 ENCOUNTER — LAB (OUTPATIENT)
Dept: LAB | Facility: HOSPITAL | Age: 46
End: 2021-03-01

## 2021-03-01 DIAGNOSIS — Z16.12 INFECTION DUE TO EXTENDED SPECTRUM BETA-LACTAMASE PRODUCING BACTERIA: ICD-10-CM

## 2021-03-01 DIAGNOSIS — Z16.12 INFECTION DUE TO EXTENDED SPECTRUM BETA-LACTAMASE PRODUCING BACTERIA: Primary | ICD-10-CM

## 2021-03-01 DIAGNOSIS — A49.9 INFECTION DUE TO EXTENDED SPECTRUM BETA-LACTAMASE PRODUCING BACTERIA: Primary | ICD-10-CM

## 2021-03-01 DIAGNOSIS — A49.9 INFECTION DUE TO EXTENDED SPECTRUM BETA-LACTAMASE PRODUCING BACTERIA: ICD-10-CM

## 2021-03-01 DIAGNOSIS — N39.0 URINARY TRACT INFECTION WITHOUT HEMATURIA, SITE UNSPECIFIED: ICD-10-CM

## 2021-03-01 DIAGNOSIS — N39.0 URINARY TRACT INFECTION WITHOUT HEMATURIA, SITE UNSPECIFIED: Primary | ICD-10-CM

## 2021-03-01 LAB
ALBUMIN SERPL-MCNC: 3.7 G/DL (ref 3.5–5.2)
ALBUMIN/GLOB SERPL: 1.4 G/DL
ALP SERPL-CCNC: 86 U/L (ref 39–117)
ALT SERPL W P-5'-P-CCNC: 14 U/L (ref 1–33)
ANION GAP SERPL CALCULATED.3IONS-SCNC: 5 MMOL/L (ref 5–15)
AST SERPL-CCNC: 19 U/L (ref 1–32)
BACTERIA UR QL AUTO: ABNORMAL /HPF
BASOPHILS # BLD AUTO: 0.08 10*3/MM3 (ref 0–0.2)
BASOPHILS NFR BLD AUTO: 1.6 % (ref 0–1.5)
BILIRUB SERPL-MCNC: 0.3 MG/DL (ref 0–1.2)
BILIRUB UR QL STRIP: NEGATIVE
BUN SERPL-MCNC: 12 MG/DL (ref 6–20)
BUN/CREAT SERPL: 18.5 (ref 7–25)
CALCIUM SPEC-SCNC: 9.3 MG/DL (ref 8.6–10.5)
CHLORIDE SERPL-SCNC: 104 MMOL/L (ref 98–107)
CLARITY UR: CLEAR
CO2 SERPL-SCNC: 27 MMOL/L (ref 22–29)
COLOR UR: YELLOW
CREAT SERPL-MCNC: 0.65 MG/DL (ref 0.57–1)
CRP SERPL-MCNC: <0.3 MG/DL (ref 0–0.5)
DEPRECATED RDW RBC AUTO: 44.6 FL (ref 37–54)
EOSINOPHIL # BLD AUTO: 0.5 10*3/MM3 (ref 0–0.4)
EOSINOPHIL NFR BLD AUTO: 9.9 % (ref 0.3–6.2)
ERYTHROCYTE [DISTWIDTH] IN BLOOD BY AUTOMATED COUNT: 12 % (ref 12.3–15.4)
ERYTHROCYTE [SEDIMENTATION RATE] IN BLOOD: 5 MM/HR (ref 0–20)
GFR SERPL CREATININE-BSD FRML MDRD: 99 ML/MIN/1.73
GLOBULIN UR ELPH-MCNC: 2.7 GM/DL
GLUCOSE SERPL-MCNC: 107 MG/DL (ref 65–99)
GLUCOSE UR STRIP-MCNC: NEGATIVE MG/DL
HCT VFR BLD AUTO: 39.6 % (ref 34–46.6)
HGB BLD-MCNC: 12.8 G/DL (ref 12–15.9)
HGB UR QL STRIP.AUTO: ABNORMAL
HYALINE CASTS UR QL AUTO: ABNORMAL /LPF
IMM GRANULOCYTES # BLD AUTO: 0.01 10*3/MM3 (ref 0–0.05)
IMM GRANULOCYTES NFR BLD AUTO: 0.2 % (ref 0–0.5)
KETONES UR QL STRIP: NEGATIVE
LEUKOCYTE ESTERASE UR QL STRIP.AUTO: NEGATIVE
LYMPHOCYTES # BLD AUTO: 1.95 10*3/MM3 (ref 0.7–3.1)
LYMPHOCYTES NFR BLD AUTO: 38.5 % (ref 19.6–45.3)
MCH RBC QN AUTO: 32.5 PG (ref 26.6–33)
MCHC RBC AUTO-ENTMCNC: 32.3 G/DL (ref 31.5–35.7)
MCV RBC AUTO: 100.5 FL (ref 79–97)
MONOCYTES # BLD AUTO: 0.53 10*3/MM3 (ref 0.1–0.9)
MONOCYTES NFR BLD AUTO: 10.5 % (ref 5–12)
NEUTROPHILS NFR BLD AUTO: 1.99 10*3/MM3 (ref 1.7–7)
NEUTROPHILS NFR BLD AUTO: 39.3 % (ref 42.7–76)
NITRITE UR QL STRIP: NEGATIVE
NRBC BLD AUTO-RTO: 0 /100 WBC (ref 0–0.2)
PH UR STRIP.AUTO: 7 [PH] (ref 5–8)
PLATELET # BLD AUTO: 241 10*3/MM3 (ref 140–450)
PMV BLD AUTO: 9.8 FL (ref 6–12)
POTASSIUM SERPL-SCNC: 3.7 MMOL/L (ref 3.5–5.2)
PROT SERPL-MCNC: 6.4 G/DL (ref 6–8.5)
PROT UR QL STRIP: NEGATIVE
RBC # BLD AUTO: 3.94 10*6/MM3 (ref 3.77–5.28)
RBC # UR: ABNORMAL /HPF
REF LAB TEST METHOD: ABNORMAL
SODIUM SERPL-SCNC: 136 MMOL/L (ref 136–145)
SP GR UR STRIP: 1.02 (ref 1–1.03)
SQUAMOUS #/AREA URNS HPF: ABNORMAL /HPF
UROBILINOGEN UR QL STRIP: ABNORMAL
WBC # BLD AUTO: 5.06 10*3/MM3 (ref 3.4–10.8)
WBC UR QL AUTO: ABNORMAL /HPF

## 2021-03-01 PROCEDURE — 36415 COLL VENOUS BLD VENIPUNCTURE: CPT | Performed by: INTERNAL MEDICINE

## 2021-03-01 PROCEDURE — 86140 C-REACTIVE PROTEIN: CPT | Performed by: INTERNAL MEDICINE

## 2021-03-01 PROCEDURE — 85652 RBC SED RATE AUTOMATED: CPT | Performed by: INTERNAL MEDICINE

## 2021-03-01 PROCEDURE — 81001 URINALYSIS AUTO W/SCOPE: CPT

## 2021-03-01 PROCEDURE — 85025 COMPLETE CBC W/AUTO DIFF WBC: CPT

## 2021-03-01 PROCEDURE — 80053 COMPREHEN METABOLIC PANEL: CPT | Performed by: INTERNAL MEDICINE

## 2021-03-01 PROCEDURE — 87086 URINE CULTURE/COLONY COUNT: CPT

## 2021-03-03 LAB — BACTERIA SPEC AEROBE CULT: ABNORMAL

## 2021-03-23 ENCOUNTER — APPOINTMENT (OUTPATIENT)
Dept: PREADMISSION TESTING | Facility: HOSPITAL | Age: 46
End: 2021-03-23

## 2021-03-26 ENCOUNTER — TELEPHONE (OUTPATIENT)
Dept: ONCOLOGY | Facility: CLINIC | Age: 46
End: 2021-03-26

## 2021-03-26 DIAGNOSIS — C50.412 MALIGNANT NEOPLASM OF UPPER-OUTER QUADRANT OF LEFT BREAST IN FEMALE, ESTROGEN RECEPTOR POSITIVE (HCC): Primary | ICD-10-CM

## 2021-03-26 DIAGNOSIS — Z17.0 MALIGNANT NEOPLASM OF UPPER-OUTER QUADRANT OF LEFT BREAST IN FEMALE, ESTROGEN RECEPTOR POSITIVE (HCC): Primary | ICD-10-CM

## 2021-03-26 NOTE — TELEPHONE ENCOUNTER
Called patient and informed her Dr. Lopez had nurse put orders in for ct scan. Informing her that we will try to get scheduled for this week. Patient stating she understood.

## 2021-03-26 NOTE — TELEPHONE ENCOUNTER
----- Message from Ellie Dawn RN sent at 3/26/2021  8:44 AM EDT -----  Patient called triage line to report that she has enlarged lymph nodes that have stayed enlarged.  She said that she would like some direction from Dr. Lopez.  Phone [phone].

## 2021-03-31 ENCOUNTER — HOSPITAL ENCOUNTER (OUTPATIENT)
Dept: CT IMAGING | Facility: HOSPITAL | Age: 46
Discharge: HOME | End: 2021-03-31
Admitting: INTERNAL MEDICINE

## 2021-03-31 DIAGNOSIS — C50.412 MALIGNANT NEOPLASM OF UPPER-OUTER QUADRANT OF LEFT BREAST IN FEMALE, ESTROGEN RECEPTOR POSITIVE (HCC): ICD-10-CM

## 2021-03-31 DIAGNOSIS — Z17.0 MALIGNANT NEOPLASM OF UPPER-OUTER QUADRANT OF LEFT BREAST IN FEMALE, ESTROGEN RECEPTOR POSITIVE (HCC): ICD-10-CM

## 2021-03-31 PROCEDURE — 71260 CT THORAX DX C+: CPT

## 2021-03-31 PROCEDURE — 74177 CT ABD & PELVIS W/CONTRAST: CPT

## 2021-03-31 PROCEDURE — 25010000002 IOPAMIDOL 61 % SOLUTION: Performed by: INTERNAL MEDICINE

## 2021-03-31 RX ADMIN — IOPAMIDOL 80 ML: 612 INJECTION, SOLUTION INTRAVENOUS at 15:33

## 2021-04-01 ENCOUNTER — TELEPHONE (OUTPATIENT)
Dept: ONCOLOGY | Facility: CLINIC | Age: 46
End: 2021-04-01

## 2021-04-01 NOTE — TELEPHONE ENCOUNTER
Returned call to patient after discussing with Sparkle BARRON. Informing patient that per Sparkle BARRON there is no obvious signs of Metastatic disease. Informing her that nurse will have Dr. Lopez look over scans. Patient asking if there is any lab work she could have done. Informing patient nurse would discuss with Dr. Lopez and would call her when lab orders are entered.

## 2021-04-01 NOTE — TELEPHONE ENCOUNTER
Caller: RAYMOND CORONADO    Relationship: SELF    Best call back number: [phone]    What test was performed: CT SCAN    When was the test performed: 3-

## 2021-04-02 ENCOUNTER — OFFICE VISIT (OUTPATIENT)
Dept: INTERNAL MEDICINE | Facility: CLINIC | Age: 46
End: 2021-04-02

## 2021-04-02 VITALS
TEMPERATURE: 97.8 F | HEIGHT: 65 IN | SYSTOLIC BLOOD PRESSURE: 122 MMHG | RESPIRATION RATE: 20 BRPM | OXYGEN SATURATION: 98 % | DIASTOLIC BLOOD PRESSURE: 82 MMHG | BODY MASS INDEX: 27.46 KG/M2 | HEART RATE: 89 BPM

## 2021-04-02 DIAGNOSIS — J02.9 ACUTE PHARYNGITIS, UNSPECIFIED ETIOLOGY: ICD-10-CM

## 2021-04-02 DIAGNOSIS — N76.0 ACUTE VAGINITIS: ICD-10-CM

## 2021-04-02 DIAGNOSIS — I88.9 LYMPHADENITIS: ICD-10-CM

## 2021-04-02 DIAGNOSIS — R53.83 OTHER FATIGUE: Primary | ICD-10-CM

## 2021-04-02 LAB
25(OH)D3 SERPL-MCNC: 21 NG/ML (ref 30–100)
ALBUMIN SERPL-MCNC: 4 G/DL (ref 3.5–5.2)
ALBUMIN/GLOB SERPL: 1.4 G/DL
ALP SERPL-CCNC: 108 U/L (ref 39–117)
ALT SERPL W P-5'-P-CCNC: 10 U/L (ref 1–33)
ANION GAP SERPL CALCULATED.3IONS-SCNC: 14.6 MMOL/L (ref 5–15)
AST SERPL-CCNC: 21 U/L (ref 1–32)
BASOPHILS # BLD AUTO: 0.09 10*3/MM3 (ref 0–0.2)
BASOPHILS NFR BLD AUTO: 1.5 % (ref 0–1.5)
BILIRUB SERPL-MCNC: <0.2 MG/DL (ref 0–1.2)
BUN SERPL-MCNC: 11 MG/DL (ref 6–20)
BUN/CREAT SERPL: 17.2 (ref 7–25)
CALCIUM SPEC-SCNC: 9.8 MG/DL (ref 8.6–10.5)
CHLORIDE SERPL-SCNC: 105 MMOL/L (ref 98–107)
CO2 SERPL-SCNC: 21.4 MMOL/L (ref 22–29)
CREAT SERPL-MCNC: 0.64 MG/DL (ref 0.57–1)
DEPRECATED RDW RBC AUTO: 41.2 FL (ref 37–54)
EOSINOPHIL # BLD AUTO: 0.41 10*3/MM3 (ref 0–0.4)
EOSINOPHIL NFR BLD AUTO: 6.8 % (ref 0.3–6.2)
ERYTHROCYTE [DISTWIDTH] IN BLOOD BY AUTOMATED COUNT: 12.1 % (ref 12.3–15.4)
FOLATE SERPL-MCNC: 4.67 NG/ML (ref 4.78–24.2)
GFR SERPL CREATININE-BSD FRML MDRD: 100 ML/MIN/1.73
GLOBULIN UR ELPH-MCNC: 2.8 GM/DL
GLUCOSE SERPL-MCNC: 71 MG/DL (ref 65–99)
HCT VFR BLD AUTO: 39.8 % (ref 34–46.6)
HGB BLD-MCNC: 13.8 G/DL (ref 12–15.9)
HIV1+2 AB SER QL: NORMAL
LYMPHOCYTES # BLD AUTO: 1.46 10*3/MM3 (ref 0.7–3.1)
LYMPHOCYTES NFR BLD AUTO: 24.2 % (ref 19.6–45.3)
MCH RBC QN AUTO: 32.2 PG (ref 26.6–33)
MCHC RBC AUTO-ENTMCNC: 34.7 G/DL (ref 31.5–35.7)
MCV RBC AUTO: 93 FL (ref 79–97)
MONOCYTES # BLD AUTO: 0.32 10*3/MM3 (ref 0.1–0.9)
MONOCYTES NFR BLD AUTO: 5.3 % (ref 5–12)
NEUTROPHILS NFR BLD AUTO: 3.74 10*3/MM3 (ref 1.7–7)
NEUTROPHILS NFR BLD AUTO: 62 % (ref 42.7–76)
PLATELET # BLD AUTO: 388 10*3/MM3 (ref 140–450)
PMV BLD AUTO: 10.7 FL (ref 6–12)
POTASSIUM SERPL-SCNC: 4.2 MMOL/L (ref 3.5–5.2)
PROT SERPL-MCNC: 6.8 G/DL (ref 6–8.5)
RBC # BLD AUTO: 4.28 10*6/MM3 (ref 3.77–5.28)
SODIUM SERPL-SCNC: 141 MMOL/L (ref 136–145)
T4 FREE SERPL-MCNC: 1.03 NG/DL (ref 0.93–1.7)
TSH SERPL DL<=0.05 MIU/L-ACNC: 0.75 UIU/ML (ref 0.27–4.2)
VIT B12 BLD-MCNC: 496 PG/ML (ref 211–946)
WBC # BLD AUTO: 6.03 10*3/MM3 (ref 3.4–10.8)

## 2021-04-02 PROCEDURE — 80053 COMPREHEN METABOLIC PANEL: CPT | Performed by: INTERNAL MEDICINE

## 2021-04-02 PROCEDURE — 84439 ASSAY OF FREE THYROXINE: CPT | Performed by: INTERNAL MEDICINE

## 2021-04-02 PROCEDURE — 82306 VITAMIN D 25 HYDROXY: CPT | Performed by: INTERNAL MEDICINE

## 2021-04-02 PROCEDURE — 82746 ASSAY OF FOLIC ACID SERUM: CPT | Performed by: INTERNAL MEDICINE

## 2021-04-02 PROCEDURE — 86665 EPSTEIN-BARR CAPSID VCA: CPT | Performed by: INTERNAL MEDICINE

## 2021-04-02 PROCEDURE — G0432 EIA HIV-1/HIV-2 SCREEN: HCPCS | Performed by: INTERNAL MEDICINE

## 2021-04-02 PROCEDURE — 85025 COMPLETE CBC W/AUTO DIFF WBC: CPT | Performed by: INTERNAL MEDICINE

## 2021-04-02 PROCEDURE — 82607 VITAMIN B-12: CPT | Performed by: INTERNAL MEDICINE

## 2021-04-02 PROCEDURE — U0004 COV-19 TEST NON-CDC HGH THRU: HCPCS | Performed by: INTERNAL MEDICINE

## 2021-04-02 PROCEDURE — 99214 OFFICE O/P EST MOD 30 MIN: CPT | Performed by: INTERNAL MEDICINE

## 2021-04-02 PROCEDURE — 86664 EPSTEIN-BARR NUCLEAR ANTIGEN: CPT | Performed by: INTERNAL MEDICINE

## 2021-04-02 PROCEDURE — 84443 ASSAY THYROID STIM HORMONE: CPT | Performed by: INTERNAL MEDICINE

## 2021-04-02 PROCEDURE — 36415 COLL VENOUS BLD VENIPUNCTURE: CPT | Performed by: INTERNAL MEDICINE

## 2021-04-02 RX ORDER — FLUCONAZOLE 150 MG/1
150 TABLET ORAL ONCE
Qty: 2 TABLET | Refills: 0 | Status: SHIPPED | OUTPATIENT
Start: 2021-04-02 | End: 2021-04-15

## 2021-04-02 NOTE — PATIENT INSTRUCTIONS
Recommend Tylenol, Ibuprofen, and plenty of fluids.    How to Quarantine at Home  Information for Patients and Families    These instructions are for people with confirmed or suspected COVID-19 who do not need to be hospitalized and those with confirmed COVID-19 who were hospitalized and discharged to care for themselves at home.    If you were tested through the Health Department  The Health Department will monitor your wellbeing.  If it is determined that you do not need to be hospitalized and can be isolated at home, you will be monitored by staff from your local or state health department.     If you were tested through a Commercial Lab  You will need to monitor yourself and report changes in your symptoms to your doctor.  See the section below called Monitor Your Symptoms.    Follow these steps until a healthcare provider or local or state health department says you can return to your normal activities.    Stay home except to get medical care  • Restrict activities outside your home, except for getting medical care.   • Do not go to work, school, or public areas.   • Avoid using public transportation, ride-sharing, or taxis.    Separate yourself from other people and animals in your home  People  As much as possible, you should stay in a specific room and away from other people in your home. Also, you should use a separate bathroom, if available.    Animals  You should restrict contact with pets and other animals while you are sick with COVID-19, just like you would around other people. When possible, have another member of your household care for your animals while you are sick. If you are sick with COVID-19, avoid contact with your pet, including petting, snuggling, being kissed or licked, and sharing food. If you must care for your pet or be around animals while you are sick, wash your hands before and after you interact with pets and wear a facemask. See COVID-19 and Animals for more information.    Call  ahead before visiting your doctor  If you have a medical appointment, call the healthcare provider and tell them that you have or may have COVID-19. This information will help the healthcare provider’s office take steps to keep other people from getting infected or exposed.    Wear a facemask  You should wear a facemask when you are around other people (e.g., sharing a room or vehicle) or pets and before you enter a healthcare provider’s office.     If you are not able to wear a facemask (for example, because it causes trouble breathing), then people who live with you should not stay in the same room with you, or they should wear a facemask if they enter your room.    Cover your coughs and sneezes  • Cover your mouth and nose with a tissue when you cough or sneeze.   • Throw used tissues in a lined trash can.   • Immediately wash your hands with soap and water for at least 20 seconds or, if soap and water are not available, clean your hands with an alcohol-based hand  that contains at least 60% alcohol.    Clean your hands often  • Wash your hands often with soap and water for at least 20 seconds, especially after blowing your nose, coughing, or sneezing; going to the bathroom; and before eating or preparing food.     • If soap and water are not readily available, use an alcohol-based hand  with at least 60% alcohol, covering all surfaces of your hands and rubbing them together until they feel dry.    • Soap and water are the best option if hands are visibly dirty. Avoid touching your eyes, nose, and mouth with unwashed hands.    Avoid sharing personal household items  • You should not share dishes, drinking glasses, cups, eating utensils, towels, or bedding with other people or pets in your home.   • After using these items, they should be washed thoroughly with soap and water.    Clean all “high-touch” surfaces everyday  • High touch surfaces include counters, tabletops, doorknobs, bathroom  fixtures, toilets, phones, keyboards, tablets, and bedside tables.   • Also, clean any surfaces that may have blood, stool, or body fluids on them.   • Use a household cleaning spray or wipe, according to the label instructions. Labels contain instructions for safe and effective use of the cleaning product, including precautions you should take when applying the product, such as wearing gloves and making sure you have good ventilation during use of the product.    Monitor your symptoms  • Seek prompt medical attention if your illness is worsening (e.g., difficulty breathing).   • Before seeking care, call your healthcare provider and tell them that you have, or are being evaluated for, COVID-19.   • Put on a facemask before you enter the facility.     • These steps will help the healthcare provider’s office to keep other people in the office or waiting room from getting infected or exposed.   • Persons who are placed under active monitoring or facilitated self-monitoring should follow instructions provided by their local health department or occupational health professionals, as appropriate.  • If you have a medical emergency and need to call 911, notify the dispatch personnel that you have, or are being evaluated for COVID-19. If possible, put on a facemask before emergency medical services arrive.    Discontinuing home isolation  Patients with confirmed COVID-19 should remain under home isolation precautions until the risk of secondary transmission to others is thought to be low. The decision to discontinue home isolation precautions should be made on a case-by-case basis, in consultation with healthcare providers and state and local health departments.    The below content are for household members, intimate partners, and caregivers of a patient with symptomatic laboratory-confirmed COVID-19 or a patient under investigation:    Household members, intimate partners, and caregivers may have close contact with a  person with symptomatic, laboratory-confirmed COVID-19 or a person under investigation.     Close contacts should monitor their health; they should call their healthcare provider right away if they develop symptoms suggestive of COVID-19 (e.g., fever, cough, shortness of breath)     Close contacts should also follow these recommendations:  • Make sure that you understand and can help the patient follow their healthcare provider’s instructions for medication(s) and care. You should help the patient with basic needs in the home and provide support for getting groceries, prescriptions, and other personal needs.  • Monitor the patient’s symptoms. If the patient is getting sicker, call his or her healthcare provider and tell them that the patient has laboratory-confirmed COVID-19. This will help the healthcare provider’s office take steps to keep other people in the office or waiting room from getting infected. Ask the healthcare provider to call the local or [facility] health department for additional guidance. If the patient has a medical emergency and you need to call 911, notify the dispatch personnel that the patient has, or is being evaluated for COVID-19.  • Household members should stay in another room or be  from the patient as much as possible. Household members should use a separate bedroom and bathroom, if available.  • Prohibit visitors who do not have an essential need to be in the home.  • Household members should care for any pets in the home. Do not handle pets or other animals while sick.  For more information, see COVID-19 and Animals.  • Make sure that shared spaces in the home have good air flow, such as by an air conditioner or an opened window, weather permitting.  • Perform hand hygiene frequently. Wash your hands often with soap and water for at least 20 seconds or use an alcohol-based hand  that contains 60 to 95% alcohol, covering all surfaces of your hands and rubbing them  together until they feel dry. Soap and water should be used preferentially if hands are visibly dirty.  • Avoid touching your eyes, nose, and mouth with unwashed hands.  • The patient should wear a facemask when you are around other people. If the patient is not able to wear a facemask (for example, because it causes trouble breathing), you, as the caregiver, should wear a mask when you are in the same room as the patient.  • Wear a disposable facemask and gloves when you touch or have contact with the patient’s blood, stool, or body fluids, such as saliva, sputum, nasal mucus, vomit, or urine.   o Throw out disposable facemasks and gloves after using them. Do not reuse.  o When removing personal protective equipment, first remove and dispose of gloves. Then, immediately clean your hands with soap and water or alcohol-based hand . Next, remove and dispose of facemask, and immediately clean your hands again with soap and water or alcohol-based hand .  • Avoid sharing household items with the patient. You should not share dishes, drinking glasses, cups, eating utensils, towels, bedding, or other items. After the patient uses these items, you should wash them thoroughly (see below “Wash laundry thoroughly”).  • Clean all “high-touch” surfaces, such as counters, tabletops, doorknobs, bathroom fixtures, toilets, phones, keyboards, tablets, and bedside tables, every day. Also, clean any surfaces that may have blood, stool, or body fluids on them.   o Use a household cleaning spray or wipe, according to the label instructions. Labels contain instructions for safe and effective use of the cleaning product including precautions you should take when applying the product, such as wearing gloves and making sure you have good ventilation during use of the product.  • Wash laundry thoroughly.   o Immediately remove and wash clothes or bedding that have blood, stool, or body fluids on them.  o Wear disposable  gloves while handling soiled items and keep soiled items away from your body. Clean your hands (with soap and water or an alcohol-based hand ) immediately after removing your gloves.  o Read and follow directions on labels of laundry or clothing items and detergent. In general, using a normal laundry detergent according to washing machine instructions and dry thoroughly using the warmest temperatures recommended on the clothing label.  • Place all used disposable gloves, facemasks, and other contaminated items in a lined container before disposing of them with other household waste. Clean your hands (with soap and water or an alcohol-based hand ) immediately after handling these items. Soap and water should be used preferentially if hands are visibly dirty.  • Discuss any additional questions with your state or local health department or healthcare provider.    Adapted from information provided by the Centers for Disease Control and Prevention.  For more information, visit https://www.cdc.gov/coronavirus/2019-ncov/hcp/guidance-prevent-spread.html

## 2021-04-02 NOTE — PROGRESS NOTES
Subjective       Maryana Love is a 45 y.o. female.     Chief Complaint   Patient presents with   • Fatigue   • Adenopathy       History obtained from the patient.    Of note, the patient was diagnosed with E. coli ESBL UTI on 2/18/2021. She was referred to Infectious Disease. She states she was treated with oral antibiotics for 3 days initially without improvement in her symptoms. She states she was then placed on 2 weeks of IV antibiotics with resolution of her symptoms. She denies any urinary symptoms currently.    The patient states her  was exposed to Mono at work. They do not wear masks at work. There is no known Covid 19, Strep, or other illness exposure.    Fatigue  This is a new problem. Episode onset: 3 weeks ago. The problem occurs constantly. The problem has been unchanged. Associated symptoms include arthralgias (chronic), coughing (mild chronic), fatigue, headaches, a sore throat, swollen glands (bilateral  worsening) and vomiting (x 1). Pertinent negatives include no abdominal pain, chest pain, chills, congestion, fever, joint swelling, myalgias, nausea, neck pain, numbness (and no tingling), rash or urinary symptoms. Nothing aggravates the symptoms. She has tried nothing for the symptoms.   Sore Throat   This is a new problem. Episode onset: 3 weeks ago. The problem has been unchanged. The pain is worse on the right side. There has been no fever. The pain is mild. Associated symptoms include coughing (mild chronic), ear pain (left), headaches, swollen glands (bilateral  worsening) and vomiting (x 1). Pertinent negatives include no abdominal pain, congestion, diarrhea, ear discharge, hoarse voice, plugged ear sensation, neck pain, shortness of breath or stridor. She has had exposure to mono. She has had no exposure to strep. She has tried NSAIDs for the symptoms. The treatment provided moderate relief.      Due to the patient's enlarged lymph nodes, she called Dr. Lopez on 3/26/21. He  ordered a CT chest, abdomen, and pelvis. CT chest showed no bulky axillary adenopathy but there was an increase in the previously identified soft tissue thickening and subcutaneous edema. There was no discrete nodule and no sign of metastases. CT abdomen and pelvis was negative.    The following portions of the patient's history were reviewed and updated as appropriate: allergies, current medications, past family history, past medical history, past social history, past surgical history and problem list.      Review of Systems   Constitutional: Positive for fatigue. Negative for chills, fever and unexpected weight change.   HENT: Positive for ear pain (left) and sore throat. Negative for congestion, ear discharge, hoarse voice, nosebleeds, postnasal drip and rhinorrhea.    Respiratory: Positive for cough (mild chronic). Negative for shortness of breath, wheezing and stridor.         Denies hemoptysis     Cardiovascular: Negative for chest pain.   Gastrointestinal: Positive for vomiting (x 1). Negative for abdominal pain, blood in stool, constipation, diarrhea and nausea.        Denies melena.     Endocrine: Negative for cold intolerance, heat intolerance, polydipsia, polyphagia and polyuria.        Has dry skin, but no hair loss   Genitourinary: Negative for dysuria, frequency, hematuria, pelvic pain, urgency and vaginal discharge.        She is complaining of an itchy rash in the genital area, requesting Diflucan.   Musculoskeletal: Positive for arthralgias (chronic). Negative for joint swelling, myalgias and neck pain.   Skin: Negative for rash.        Denies breast pain and mass   Neurological: Positive for headaches. Negative for dizziness, light-headedness and numbness (and no tingling).   Hematological: Positive for adenopathy. Bruises/bleeds easily (easy bruises).   Psychiatric/Behavioral: Positive for sleep disturbance (chronic).           Objective     Blood pressure 122/82, pulse 89, temperature 97.8 °F  "(36.6 °C), temperature source Temporal, resp. rate 20, height 165.1 cm (65\"), SpO2 98 %, not currently breastfeeding.    Physical Exam  Vitals and nursing note reviewed.   Constitutional:       Appearance: She is well-developed and normal weight.   HENT:      Head: Normocephalic and atraumatic.      Comments: No maxillary or frontal sinus tenderness to palpation.     Right Ear: Tympanic membrane, ear canal and external ear normal.      Left Ear: Tympanic membrane, ear canal and external ear normal.      Mouth/Throat:      Mouth: Mucous membranes are moist. No oral lesions.      Pharynx: Oropharynx is clear.      Comments: Tonsils normal.  Eyes:      Conjunctiva/sclera: Conjunctivae normal.   Cardiovascular:      Rate and Rhythm: Normal rate and regular rhythm.      Heart sounds: No murmur heard.     Pulmonary:      Effort: Pulmonary effort is normal.      Breath sounds: Normal breath sounds.   Musculoskeletal:      Cervical back: Normal range of motion and neck supple.   Lymphadenopathy:      Cervical: Cervical adenopathy (bilateral enlarged, nontender anterior, but not posterior, cervical lymph nodes) present.      Upper Body:      Right upper body: Axillary adenopathy (2 enlarged and tender lymph node) present. No supraclavicular adenopathy.      Left upper body: No supraclavicular or axillary adenopathy.   Skin:     Findings: No rash.   Neurological:      Mental Status: She is alert.   Psychiatric:         Mood and Affect: Mood normal.           Assessment/Plan   Diagnoses and all orders for this visit:    1. Other fatigue (Primary)  -     COVID-19 PCR, [facility], NP SWAB IN [facility]SPORT MEDIA 24-30 HR TAT - Swab, Nasopharynx; Future  -     Comprehensive Metabolic Panel  -     Vitamin B12  -     CBC & Differential  -     Vitamin D 25 Hydroxy  -     TSH  -     T4, Free  -     EBV Antibody Profile  -     HIV-1 / O / 2 Ag / Antibody 4th Generation  -     Folate  -     COVID-19 PCR, [facility], NP SWAB IN " [facility] [employer]SP[employer] MEDIA 24-30 HR TAT - Swab, Nasopharynx    2. Acute pharyngitis, unspecified etiology  -     COVID-19 PCR, [facility], NP SWAB IN [facility] TRANSPORT MEDIA 24-30 HR TAT - Swab, Nasopharynx; Future  -     COVID-19 PCR, [facility], NP SWAB IN [facility]AL TRANSPORT MEDIA 24-30 HR TAT - Swab, Nasopharynx   Recommended Tylenol, Ibuprofen, and plenty of fluids.    3. Lymphadenitis  -     COVID-19 PCR, [facility], NP SWAB IN [facility] TRANSPORT MEDIA 24-30 HR TAT - Swab, Nasopharynx; Future  -     CBC & Differential  -     EBV Antibody Profile  -     HIV-1 / O / 2 Ag / Antibody 4th Generation  -     COVID-19 PCR, [facility], NP SWAB IN [facility] TRANSPORT MEDIA 24-30 HR TAT - Swab, Nasopharynx    4. Acute vaginitis  -     fluconazole (Diflucan) 150 MG tablet; Take 1 tablet by mouth 1 (One) Time for 1 dose. May repeat after 4 days.  Dispense: 2 tablet; Refill: 0        Return if symptoms worsen or fail to improve.

## 2021-04-03 LAB — SARS-COV-2 RNA NOSE QL NAA+PROBE: NOT DETECTED

## 2021-04-05 ENCOUNTER — TELEPHONE (OUTPATIENT)
Dept: INTERNAL MEDICINE | Facility: CLINIC | Age: 46
End: 2021-04-05

## 2021-04-05 DIAGNOSIS — R59.9 ADENOPATHY: Primary | ICD-10-CM

## 2021-04-05 LAB
EBV NA IGG SER IA-ACNC: 346 U/ML (ref 0–17.9)
EBV VCA IGG SER IA-ACNC: 462 U/ML (ref 0–17.9)
EBV VCA IGM SER IA-ACNC: <36 U/ML (ref 0–35.9)
SERVICE CMNT-IMP: ABNORMAL

## 2021-04-05 NOTE — TELEPHONE ENCOUNTER
Marjan notified .    Verbal understanding given   She states she is waiting on an appointment for Dr MCNAMARA for a surgical biopsy of lymph nodes under Right arm.  She is concerned she has cancer again and is afraid she is going to die.

## 2021-04-05 NOTE — TELEPHONE ENCOUNTER
Please reassure her the Vitamin D and Folate levels are only a little low and should not cause severe fatigue.  I will send a lab letter with instructions on supplementation.  Would recommend Vitamin D3 1000 to 2000 IU daily and a daily Vitamin B complex vitamin.    Also, see results note.

## 2021-04-05 NOTE — TELEPHONE ENCOUNTER
She is calling for lab results    She has extreme fatigue , lack  Of energy and headache  She is concerned about the folate and and vitamin D results  She is concerned she has folate anemia

## 2021-04-06 ENCOUNTER — TELEPHONE (OUTPATIENT)
Dept: INTERNAL MEDICINE | Facility: CLINIC | Age: 46
End: 2021-04-06

## 2021-04-12 ENCOUNTER — TELEPHONE (OUTPATIENT)
Dept: ONCOLOGY | Facility: CLINIC | Age: 46
End: 2021-04-12

## 2021-04-14 DIAGNOSIS — N76.0 ACUTE VAGINITIS: ICD-10-CM

## 2021-04-15 ENCOUNTER — TELEPHONE (OUTPATIENT)
Dept: INTERNAL MEDICINE | Facility: CLINIC | Age: 46
End: 2021-04-15

## 2021-04-15 DIAGNOSIS — F10.19 DISORDER DUE TO ALCOHOL ABUSE (HCC): Primary | ICD-10-CM

## 2021-04-15 RX ORDER — FLUCONAZOLE 150 MG/1
TABLET ORAL
Qty: 2 TABLET | Refills: 0 | Status: SHIPPED | OUTPATIENT
Start: 2021-04-15 | End: 2021-06-03

## 2021-04-15 NOTE — TELEPHONE ENCOUNTER
Caller: Maryana Love    Relationship: Self    Best call back number: [phone]    What is the best time to reach you: ANYTIME TODAY    Who are you requesting to speak with (clinical staff, provider,  specific staff member): PROVIDER    What was the call regarding: A REFERRAL THAT IS PERSONAL    Do you require a callback: YES TODAY IT'S VERY IMPORTANT

## 2021-04-15 NOTE — TELEPHONE ENCOUNTER
The patient called back.  She is requesting a referral to [facility] in [facility] for alcohol rehab.  Referral order entered.

## 2021-04-23 ENCOUNTER — TELEPHONE (OUTPATIENT)
Dept: INTERNAL MEDICINE | Facility: CLINIC | Age: 46
End: 2021-04-23

## 2021-04-23 NOTE — TELEPHONE ENCOUNTER
Caller: ALEJANDRA    Relationship: NURSE AT  [facility]    Best call back number: [phone]    What was the call regarding: ALEJANDRA CALLED STATING THAT THE PATIENT HAS BEEN ADMITTED TO [facility] FOR ALCOHOL USE DISORDER. ALEJANDRA ASKED IF THE DR WOULD LIKE WEEKLY UPDATES OR A DISCHARGE SUMMARY.    Do you require a callback: YES

## 2021-05-24 ENCOUNTER — TELEPHONE (OUTPATIENT)
Dept: INTERNAL MEDICINE | Facility: CLINIC | Age: 46
End: 2021-05-24

## 2021-05-24 NOTE — TELEPHONE ENCOUNTER
Caller: Maryana Love    Relationship: Self    Best call back number:[phone]    What is the best time to reach you: ANYTIME    Who are you requesting to speak with (clinical staff, provider,  specific staff member): BRANDIE    What was the call regarding: PATIENT STATES THAT SHE HAS 2 MEDICATIONS THE NEED TO BE REFILLED, IT'S COMPLICATED AND SHE ONLY WANTS TO EXPLAIN ONCE TO  .  Do you require a callback: YES

## 2021-05-24 NOTE — TELEPHONE ENCOUNTER
Maryana states the Rehab Facility is supposed to fax her discharge Summary.    She was prescribe  Vivitrol 380mg injections that she got 5/13/2021 and next dose is due after 6/24/2021  This blocks Opiates     She is also taking Campral tablets 333 mg 2 tablets TID  She is almost out of this RX     She is asking if Dr Camargo can prescribe the Rx and she may have to come here for the injection.    Notified her Dr Camargo will be back in the clinic tommorow.

## 2021-05-25 NOTE — TELEPHONE ENCOUNTER
Those are not medications I am comfortable prescribing.  These should be prescribed by her psychiatrist,  or by someone who specializes in alcohol dependence.

## 2021-06-03 ENCOUNTER — PRE-ADMISSION TESTING (OUTPATIENT)
Dept: PREADMISSION TESTING | Facility: HOSPITAL | Age: 46
End: 2021-06-03

## 2021-06-03 ENCOUNTER — ANESTHESIA EVENT (OUTPATIENT)
Dept: PERIOP | Facility: HOSPITAL | Age: 46
End: 2021-06-03

## 2021-06-03 VITALS — WEIGHT: 151.2 LBS | HEIGHT: 65 IN | BODY MASS INDEX: 25.19 KG/M2

## 2021-06-03 LAB
ANION GAP SERPL CALCULATED.3IONS-SCNC: 15 MMOL/L (ref 5–15)
BUN SERPL-MCNC: 14 MG/DL (ref 6–20)
BUN/CREAT SERPL: 17.5 (ref 7–25)
CALCIUM SPEC-SCNC: 9.9 MG/DL (ref 8.6–10.5)
CHLORIDE SERPL-SCNC: 103 MMOL/L (ref 98–107)
CO2 SERPL-SCNC: 23 MMOL/L (ref 22–29)
CREAT SERPL-MCNC: 0.8 MG/DL (ref 0.57–1)
DEPRECATED RDW RBC AUTO: 47.5 FL (ref 37–54)
ERYTHROCYTE [DISTWIDTH] IN BLOOD BY AUTOMATED COUNT: 13.2 % (ref 12.3–15.4)
FLUAV RNA RESP QL NAA+PROBE: NOT DETECTED
FLUBV RNA RESP QL NAA+PROBE: NOT DETECTED
GFR SERPL CREATININE-BSD FRML MDRD: 78 ML/MIN/1.73
GLUCOSE SERPL-MCNC: 80 MG/DL (ref 65–99)
HCT VFR BLD AUTO: 39.8 % (ref 34–46.6)
HGB BLD-MCNC: 12.7 G/DL (ref 12–15.9)
MCH RBC QN AUTO: 31.4 PG (ref 26.6–33)
MCHC RBC AUTO-ENTMCNC: 31.9 G/DL (ref 31.5–35.7)
MCV RBC AUTO: 98.3 FL (ref 79–97)
PLATELET # BLD AUTO: 311 10*3/MM3 (ref 140–450)
PMV BLD AUTO: 9.6 FL (ref 6–12)
POTASSIUM SERPL-SCNC: 4.4 MMOL/L (ref 3.5–5.2)
RBC # BLD AUTO: 4.05 10*6/MM3 (ref 3.77–5.28)
SARS-COV-2 RNA RESP QL NAA+PROBE: NOT DETECTED
SODIUM SERPL-SCNC: 141 MMOL/L (ref 136–145)
WBC # BLD AUTO: 4.9 10*3/MM3 (ref 3.4–10.8)

## 2021-06-03 PROCEDURE — 36415 COLL VENOUS BLD VENIPUNCTURE: CPT

## 2021-06-03 PROCEDURE — 85027 COMPLETE CBC AUTOMATED: CPT

## 2021-06-03 PROCEDURE — 80048 BASIC METABOLIC PNL TOTAL CA: CPT

## 2021-06-03 PROCEDURE — 87636 SARSCOV2 & INF A&B AMP PRB: CPT

## 2021-06-03 PROCEDURE — C9803 HOPD COVID-19 SPEC COLLECT: HCPCS

## 2021-06-03 RX ORDER — ACAMPROSATE CALCIUM 333 MG/1
333 TABLET, DELAYED RELEASE ORAL 2 TIMES DAILY
COMMUNITY

## 2021-06-03 RX ORDER — SODIUM CHLORIDE 0.9 % (FLUSH) 0.9 %
10 SYRINGE (ML) INJECTION AS NEEDED
Status: CANCELLED | OUTPATIENT
Start: 2021-06-03

## 2021-06-03 RX ORDER — FAMOTIDINE 10 MG/ML
20 INJECTION, SOLUTION INTRAVENOUS ONCE
Status: CANCELLED | OUTPATIENT
Start: 2021-06-03 | End: 2021-06-03

## 2021-06-03 RX ORDER — SODIUM CHLORIDE 0.9 % (FLUSH) 0.9 %
10 SYRINGE (ML) INJECTION EVERY 12 HOURS SCHEDULED
Status: CANCELLED | OUTPATIENT
Start: 2021-06-03

## 2021-06-03 NOTE — PAT
Patient recently discharged from alcohol rehab and is currently taking Vivitrol and Campral.  Patient also restarted smoking cigarettes during rehab but stopped after discharge and returned to vaping daily (no nicotine).    Patient to apply Chlorhexadine wipes  to surgical area (as instructed) the night before procedure and the AM of procedure. Wipes provided.    Patient instructed to drink 20 ounces (or until full) of Gatorade and it needs to be completed 1 hour before given arrival time for procedure (NO RED Gatorade)    Patient verbalized understanding.

## 2021-06-04 ENCOUNTER — HOSPITAL ENCOUNTER (OUTPATIENT)
Facility: HOSPITAL | Age: 46
Setting detail: HOSPITAL OUTPATIENT SURGERY
Discharge: HOME | End: 2021-06-04
Attending: PLASTIC SURGERY | Admitting: PLASTIC SURGERY

## 2021-06-04 ENCOUNTER — ANESTHESIA (OUTPATIENT)
Dept: PERIOP | Facility: HOSPITAL | Age: 46
End: 2021-06-04

## 2021-06-04 VITALS
HEART RATE: 70 BPM | DIASTOLIC BLOOD PRESSURE: 77 MMHG | WEIGHT: 151 LBS | BODY MASS INDEX: 25.16 KG/M2 | OXYGEN SATURATION: 96 % | RESPIRATION RATE: 16 BRPM | TEMPERATURE: 97.5 F | HEIGHT: 65 IN | SYSTOLIC BLOOD PRESSURE: 115 MMHG

## 2021-06-04 DIAGNOSIS — S21.001A OPEN WOUND OF RIGHT BREAST: ICD-10-CM

## 2021-06-04 PROCEDURE — 25010000003 CEFAZOLIN IN DEXTROSE 2-4 GM/100ML-% SOLUTION: Performed by: PLASTIC SURGERY

## 2021-06-04 PROCEDURE — 25010000002 ONDANSETRON PER 1 MG: Performed by: NURSE ANESTHETIST, CERTIFIED REGISTERED

## 2021-06-04 PROCEDURE — 87176 TISSUE HOMOGENIZATION CULTR: CPT | Performed by: PLASTIC SURGERY

## 2021-06-04 PROCEDURE — 87070 CULTURE OTHR SPECIMN AEROBIC: CPT | Performed by: PLASTIC SURGERY

## 2021-06-04 PROCEDURE — 87102 FUNGUS ISOLATION CULTURE: CPT | Performed by: PLASTIC SURGERY

## 2021-06-04 PROCEDURE — 87076 CULTURE ANAEROBE IDENT EACH: CPT | Performed by: PLASTIC SURGERY

## 2021-06-04 PROCEDURE — C1889 IMPLANT/INSERT DEVICE, NOC: HCPCS | Performed by: PLASTIC SURGERY

## 2021-06-04 PROCEDURE — 25010000002 NEOSTIGMINE 10 MG/10ML SOLUTION: Performed by: NURSE ANESTHETIST, CERTIFIED REGISTERED

## 2021-06-04 PROCEDURE — 87206 SMEAR FLUORESCENT/ACID STAI: CPT | Performed by: PLASTIC SURGERY

## 2021-06-04 PROCEDURE — 25010000002 DAPTOMYCIN PER 1 MG: Performed by: INTERNAL MEDICINE

## 2021-06-04 PROCEDURE — 87075 CULTR BACTERIA EXCEPT BLOOD: CPT | Performed by: PLASTIC SURGERY

## 2021-06-04 PROCEDURE — C1789 PROSTHESIS, BREAST, IMP: HCPCS | Performed by: PLASTIC SURGERY

## 2021-06-04 PROCEDURE — 25010000003 LIDOCAINE 1 % SOLUTION: Performed by: NURSE ANESTHETIST, CERTIFIED REGISTERED

## 2021-06-04 PROCEDURE — 25010000002 ERTAPENEM PER 500 MG: Performed by: INTERNAL MEDICINE

## 2021-06-04 PROCEDURE — 25010000003 CEFAZOLIN PER 500 MG: Performed by: PLASTIC SURGERY

## 2021-06-04 PROCEDURE — 25010000002 GENTAMICIN PER 80 MG: Performed by: PLASTIC SURGERY

## 2021-06-04 PROCEDURE — 87116 MYCOBACTERIA CULTURE: CPT | Performed by: PLASTIC SURGERY

## 2021-06-04 PROCEDURE — 87205 SMEAR GRAM STAIN: CPT | Performed by: PLASTIC SURGERY

## 2021-06-04 PROCEDURE — 25010000002 PROPOFOL 10 MG/ML EMULSION: Performed by: NURSE ANESTHETIST, CERTIFIED REGISTERED

## 2021-06-04 PROCEDURE — 25010000003 MEPERIDINE PER 100 MG: Performed by: NURSE ANESTHETIST, CERTIFIED REGISTERED

## 2021-06-04 PROCEDURE — 25010000002 DEXAMETHASONE PER 1 MG: Performed by: NURSE ANESTHETIST, CERTIFIED REGISTERED

## 2021-06-04 PROCEDURE — 25010000002 MIDAZOLAM PER 1 MG: Performed by: ANESTHESIOLOGY

## 2021-06-04 PROCEDURE — 25010000002 FENTANYL CITRATE (PF) 50 MCG/ML SOLUTION: Performed by: NURSE ANESTHETIST, CERTIFIED REGISTERED

## 2021-06-04 DEVICE — KNOTLESS TISSUE CONTROL DEVICE, UNDYED UNIDIRECTIONAL (ANTIBACTERIAL) SYNTHETIC ABSORBABLE DEVICE
Type: IMPLANTABLE DEVICE | Site: BREAST | Status: FUNCTIONAL
Brand: STRATAFIX

## 2021-06-04 DEVICE — NATRELLE TE SMOOTH 133S-FV-12-T (US)
Type: IMPLANTABLE DEVICE | Site: BREAST | Status: FUNCTIONAL
Brand: NATRELLE 133S TISSUE EXPANDERS

## 2021-06-04 RX ORDER — ONDANSETRON 2 MG/ML
INJECTION INTRAMUSCULAR; INTRAVENOUS AS NEEDED
Status: DISCONTINUED | OUTPATIENT
Start: 2021-06-04 | End: 2021-06-04 | Stop reason: SURG

## 2021-06-04 RX ORDER — LIDOCAINE HYDROCHLORIDE AND EPINEPHRINE 10; 10 MG/ML; UG/ML
INJECTION, SOLUTION INFILTRATION; PERINEURAL AS NEEDED
Status: DISCONTINUED | OUTPATIENT
Start: 2021-06-04 | End: 2021-06-04 | Stop reason: HOSPADM

## 2021-06-04 RX ORDER — PROMETHAZINE HYDROCHLORIDE 25 MG/1
25 SUPPOSITORY RECTAL ONCE AS NEEDED
Status: DISCONTINUED | OUTPATIENT
Start: 2021-06-04 | End: 2021-06-04 | Stop reason: HOSPADM

## 2021-06-04 RX ORDER — MAGNESIUM HYDROXIDE 1200 MG/15ML
LIQUID ORAL AS NEEDED
Status: DISCONTINUED | OUTPATIENT
Start: 2021-06-04 | End: 2021-06-04 | Stop reason: HOSPADM

## 2021-06-04 RX ORDER — LIDOCAINE HYDROCHLORIDE 20 MG/ML
JELLY TOPICAL AS NEEDED
Status: DISCONTINUED | OUTPATIENT
Start: 2021-06-04 | End: 2021-06-04 | Stop reason: SURG

## 2021-06-04 RX ORDER — FENTANYL CITRATE 50 UG/ML
50 INJECTION, SOLUTION INTRAMUSCULAR; INTRAVENOUS
Status: DISCONTINUED | OUTPATIENT
Start: 2021-06-04 | End: 2021-06-04 | Stop reason: HOSPADM

## 2021-06-04 RX ORDER — FAMOTIDINE 20 MG/1
20 TABLET, FILM COATED ORAL ONCE
Status: COMPLETED | OUTPATIENT
Start: 2021-06-04 | End: 2021-06-04

## 2021-06-04 RX ORDER — ROCURONIUM BROMIDE 10 MG/ML
INJECTION, SOLUTION INTRAVENOUS AS NEEDED
Status: DISCONTINUED | OUTPATIENT
Start: 2021-06-04 | End: 2021-06-04 | Stop reason: SURG

## 2021-06-04 RX ORDER — PROMETHAZINE HYDROCHLORIDE 25 MG/1
25 TABLET ORAL ONCE AS NEEDED
Status: DISCONTINUED | OUTPATIENT
Start: 2021-06-04 | End: 2021-06-04 | Stop reason: HOSPADM

## 2021-06-04 RX ORDER — NEOSTIGMINE METHYLSULFATE 1 MG/ML
INJECTION, SOLUTION INTRAVENOUS AS NEEDED
Status: DISCONTINUED | OUTPATIENT
Start: 2021-06-04 | End: 2021-06-04 | Stop reason: SURG

## 2021-06-04 RX ORDER — EPHEDRINE SULFATE 50 MG/ML
INJECTION, SOLUTION INTRAVENOUS AS NEEDED
Status: DISCONTINUED | OUTPATIENT
Start: 2021-06-04 | End: 2021-06-04 | Stop reason: SURG

## 2021-06-04 RX ORDER — ONDANSETRON 2 MG/ML
4 INJECTION INTRAMUSCULAR; INTRAVENOUS ONCE AS NEEDED
Status: DISCONTINUED | OUTPATIENT
Start: 2021-06-04 | End: 2021-06-04 | Stop reason: HOSPADM

## 2021-06-04 RX ORDER — BUPIVACAINE HYDROCHLORIDE AND EPINEPHRINE 2.5; 5 MG/ML; UG/ML
INJECTION, SOLUTION EPIDURAL; INFILTRATION; INTRACAUDAL; PERINEURAL AS NEEDED
Status: DISCONTINUED | OUTPATIENT
Start: 2021-06-04 | End: 2021-06-04 | Stop reason: HOSPADM

## 2021-06-04 RX ORDER — MIDAZOLAM HYDROCHLORIDE 1 MG/ML
1 INJECTION INTRAMUSCULAR; INTRAVENOUS
Status: DISCONTINUED | OUTPATIENT
Start: 2021-06-04 | End: 2021-06-04 | Stop reason: HOSPADM

## 2021-06-04 RX ORDER — CEFAZOLIN SODIUM 2 G/100ML
2 INJECTION, SOLUTION INTRAVENOUS ONCE
Status: COMPLETED | OUTPATIENT
Start: 2021-06-04 | End: 2021-06-04

## 2021-06-04 RX ORDER — GLYCOPYRROLATE 0.2 MG/ML
INJECTION INTRAMUSCULAR; INTRAVENOUS AS NEEDED
Status: DISCONTINUED | OUTPATIENT
Start: 2021-06-04 | End: 2021-06-04 | Stop reason: SURG

## 2021-06-04 RX ORDER — MEPERIDINE HYDROCHLORIDE 25 MG/ML
12.5 INJECTION INTRAMUSCULAR; INTRAVENOUS; SUBCUTANEOUS
Status: CANCELLED | OUTPATIENT
Start: 2021-06-04 | End: 2021-06-05

## 2021-06-04 RX ORDER — LIDOCAINE HYDROCHLORIDE 10 MG/ML
INJECTION, SOLUTION INFILTRATION; PERINEURAL AS NEEDED
Status: DISCONTINUED | OUTPATIENT
Start: 2021-06-04 | End: 2021-06-04 | Stop reason: SURG

## 2021-06-04 RX ORDER — FENTANYL CITRATE 50 UG/ML
INJECTION, SOLUTION INTRAMUSCULAR; INTRAVENOUS AS NEEDED
Status: DISCONTINUED | OUTPATIENT
Start: 2021-06-04 | End: 2021-06-04 | Stop reason: SURG

## 2021-06-04 RX ORDER — LIDOCAINE HYDROCHLORIDE 10 MG/ML
0.5 INJECTION, SOLUTION EPIDURAL; INFILTRATION; INTRACAUDAL; PERINEURAL ONCE AS NEEDED
Status: COMPLETED | OUTPATIENT
Start: 2021-06-04 | End: 2021-06-04

## 2021-06-04 RX ORDER — MEPERIDINE HYDROCHLORIDE 25 MG/ML
12.5 INJECTION INTRAMUSCULAR; INTRAVENOUS; SUBCUTANEOUS
Status: COMPLETED | OUTPATIENT
Start: 2021-06-04 | End: 2021-06-04

## 2021-06-04 RX ORDER — DEXAMETHASONE SODIUM PHOSPHATE 4 MG/ML
INJECTION, SOLUTION INTRA-ARTICULAR; INTRALESIONAL; INTRAMUSCULAR; INTRAVENOUS; SOFT TISSUE AS NEEDED
Status: DISCONTINUED | OUTPATIENT
Start: 2021-06-04 | End: 2021-06-04 | Stop reason: SURG

## 2021-06-04 RX ORDER — SODIUM CHLORIDE, SODIUM LACTATE, POTASSIUM CHLORIDE, CALCIUM CHLORIDE 600; 310; 30; 20 MG/100ML; MG/100ML; MG/100ML; MG/100ML
9 INJECTION, SOLUTION INTRAVENOUS CONTINUOUS
Status: DISCONTINUED | OUTPATIENT
Start: 2021-06-04 | End: 2021-06-04 | Stop reason: HOSPADM

## 2021-06-04 RX ORDER — PROPOFOL 10 MG/ML
VIAL (ML) INTRAVENOUS AS NEEDED
Status: DISCONTINUED | OUTPATIENT
Start: 2021-06-04 | End: 2021-06-04 | Stop reason: SURG

## 2021-06-04 RX ADMIN — LIDOCAINE HYDROCHLORIDE 50 MG: 10 INJECTION, SOLUTION INFILTRATION; PERINEURAL at 13:08

## 2021-06-04 RX ADMIN — CEFAZOLIN SODIUM 2 G: 10 INJECTION, POWDER, FOR SOLUTION INTRAVENOUS at 13:04

## 2021-06-04 RX ADMIN — EPHEDRINE SULFATE 5 MG: 50 INJECTION INTRAVENOUS at 13:33

## 2021-06-04 RX ADMIN — DAPTOMYCIN 500 MG: 500 INJECTION, POWDER, LYOPHILIZED, FOR SOLUTION INTRAVENOUS at 15:54

## 2021-06-04 RX ADMIN — PROPOFOL 160 MG: 10 INJECTION, EMULSION INTRAVENOUS at 13:08

## 2021-06-04 RX ADMIN — ROCURONIUM BROMIDE 30 MG: 10 INJECTION, SOLUTION INTRAVENOUS at 13:08

## 2021-06-04 RX ADMIN — FENTANYL CITRATE 100 MCG: 50 INJECTION, SOLUTION INTRAMUSCULAR; INTRAVENOUS at 13:08

## 2021-06-04 RX ADMIN — MIDAZOLAM 1 MG: 1 INJECTION INTRAMUSCULAR; INTRAVENOUS at 12:37

## 2021-06-04 RX ADMIN — MEPERIDINE HYDROCHLORIDE 12.5 MG: 25 INJECTION INTRAMUSCULAR; INTRAVENOUS; SUBCUTANEOUS at 16:15

## 2021-06-04 RX ADMIN — MEPERIDINE HYDROCHLORIDE 12.5 MG: 25 INJECTION INTRAMUSCULAR; INTRAVENOUS; SUBCUTANEOUS at 16:00

## 2021-06-04 RX ADMIN — SODIUM CHLORIDE, POTASSIUM CHLORIDE, SODIUM LACTATE AND CALCIUM CHLORIDE: 600; 310; 30; 20 INJECTION, SOLUTION INTRAVENOUS at 14:08

## 2021-06-04 RX ADMIN — PROPOFOL 25 MCG/KG/MIN: 10 INJECTION, EMULSION INTRAVENOUS at 13:10

## 2021-06-04 RX ADMIN — EPHEDRINE SULFATE 10 MG: 50 INJECTION INTRAVENOUS at 14:13

## 2021-06-04 RX ADMIN — EPHEDRINE SULFATE 5 MG: 50 INJECTION INTRAVENOUS at 13:38

## 2021-06-04 RX ADMIN — ERTAPENEM SODIUM 1 G: 1 INJECTION, POWDER, LYOPHILIZED, FOR SOLUTION INTRAMUSCULAR; INTRAVENOUS at 16:14

## 2021-06-04 RX ADMIN — ONDANSETRON 4 MG: 2 INJECTION INTRAMUSCULAR; INTRAVENOUS at 13:48

## 2021-06-04 RX ADMIN — NEOSTIGMINE 3 MG: 1 INJECTION INTRAVENOUS at 14:02

## 2021-06-04 RX ADMIN — LIDOCAINE HYDROCHLORIDE 3 ML: 20 JELLY TOPICAL at 13:10

## 2021-06-04 RX ADMIN — LIDOCAINE HYDROCHLORIDE 0.5 ML: 10 INJECTION, SOLUTION EPIDURAL; INFILTRATION; INTRACAUDAL; PERINEURAL at 12:13

## 2021-06-04 RX ADMIN — GLYCOPYRROLATE 0.4 MG: 0.4 INJECTION INTRAMUSCULAR; INTRAVENOUS at 14:02

## 2021-06-04 RX ADMIN — DEXAMETHASONE SODIUM PHOSPHATE 8 MG: 4 INJECTION, SOLUTION INTRA-ARTICULAR; INTRALESIONAL; INTRAMUSCULAR; INTRAVENOUS; SOFT TISSUE at 13:17

## 2021-06-04 RX ADMIN — FAMOTIDINE 20 MG: 20 TABLET ORAL at 12:13

## 2021-06-04 RX ADMIN — SODIUM CHLORIDE, POTASSIUM CHLORIDE, SODIUM LACTATE AND CALCIUM CHLORIDE 9 ML/HR: 600; 310; 30; 20 INJECTION, SOLUTION INTRAVENOUS at 12:14

## 2021-06-04 RX ADMIN — EPHEDRINE SULFATE 5 MG: 50 INJECTION INTRAVENOUS at 13:53

## 2021-06-04 NOTE — CONSULTS
INFECTIOUS DISEASE CONSULT/INITIAL HOSPITAL VISIT    Maryana Love  1975  [id number]    Date of Consult: 6/4/2021    Admission Date: 6/4/2021      Requesting Provider: José Miguel Chatman MD  Evaluating Physician: Malik Jay MD    Reason for Consultation: Right reconstructed breast tissue expander infection    History of present illness:    Patient is a 45 y.o. female with a history of left breast cancer status post bilateral mastectomies with immediate reconstruction in 10/15 who is seen today for evaluation of a right reconstructed breast implant infection with an exposed implant.  She noted migration of the right implant and underwent repeat surgery with tissue expander placement on 12/15/2020.  Starting on Sunday she noted that the wound appeared abnormal.  She then developed some drainage from the wound on Wednesday.  She thinks that she may have had some subjective low-grade fevers.  She did not actually take her temperature.  She was seen by Dr. Chatman and was noted to have some exposed tissue expander.  Today she underwent removal and replacement of the tissue expander.  I saw her in the recovery room.  Of note, she has a history of recurrent ESBL E. coli UTIs for which she has seen Dr. Vera in our office.    Past Medical History:   Diagnosis Date   • Allergic rhinitis    • Anxiety disorder     Description: Long-term.   • Bipolar affective disorder (CMS/HCC)     Dx 2014- [facility].   • Chronic urinary tract infection     Description: Diagnosed 2008.  Cystoscopy normal 1/10/13, other than mild urethral inflammation. Normal diagnostic cystourethroscopy with urodynamics at  4/15.   • Colon polyps     Dx 4/19- fragments of tubular adenoma (ascending colon)- D[facility]ene   • Cyst of ovary     Description: Left (2.5 cm) dx by CT Scan 9/6/13.   • Depression     Description: Long-term.   • Eczema    • H/O abnormal mammogram     08/18/15-cat 5   • Hearing loss    • Hemangioma of liver     Description:  Diagnosed by CT scan 9/13   • History of alcohol abuse 05/2021    recent time in rehab -discharged 5/18/21; currently on medications (Vivitrol and Campral)   • History of Papanicolaou smear of cervix 2008    normal per patient   • History of UTI     feb 2021-last UTI, required IV antibiotics and Infectious disease MD visit    • History of varicella    • Malignant neoplasm of upper-outer quadrant of left female breast (CMS/HCC)     Description: dx 8/15- infiltrating and in situ low-grade ductal adenocarcinoma (Stage 2A, receptor +. HER2 neg)      • Open breast wound     right breast from surgery in dec 2020   • Tremor    • Wears contact lenses    • Wears glasses        Past Surgical History:   Procedure Laterality Date   • ABDOMINOPLASTY     • AUGMENTATION MAMMAPLASTY     • BELPHAROPTOSIS REPAIR Bilateral approx 11/2017   • BLADDER SURGERY      6/2/15- part of mesh removed   • BREAST BIOPSY Left 08/2005    cancer   • BREAST IMPLANT SURGERY Right 6/23/2016    Procedure: RIGHT BREAST RECONSTRUCTION, REVISION;  Surgeon: José Miguel Chatman MD;  Location:  [facility];  Service:    • BREAST RECONSTRUCTION, BREAST TISSUE EXPANDER INSERTION Bilateral 12/15/2020    Procedure: CONVERSION OF RECONSTRUCTED BREASTS TISSUE EXPANDER PLACEMENT BILATERAL;  Surgeon: José Miguel Chatman MD;  Location:  [facility] OR;  Service: Plastics;  Laterality: Bilateral;   • BREAST SURGERY     • COSMETIC SURGERY  09/2020   • EYE SURGERY Right approx 10/2017    Stye removal   • FAT GRAFTING Bilateral 9/29/2016    Procedure: BILATERAL BREAST FAT GRAFTING;  Surgeon: José Miguel Chatman MD;  Location:  [facility] OR;  Service:    • GASTRIC SLEEVE LAPAROSCOPIC      12/29/15   • LASIK Left approx 1/2018   • MASTECTOMY COMPLETE / SIMPLE W/ SENTINEL NODE BIOPSY Bilateral 10/05/2015    with reconstruction (sentinel node bx neg)   • NIPPLE RECONSTRUCTION Bilateral 9/29/2016    Procedure: BILATERAL NIPPLE/AEROLA RECONSTRUCTION WITH FLAP AND GRAFT;  Surgeon: José Miguel Chatman  "MD;  Location: [facility] OR;  Service:    • SINUS SURGERY  2012     endoscopic sinusotomies and polypectomies   • TOTAL ABDOMINAL HYSTERECTOMY  2008   • TUBAL ABDOMINAL LIGATION Bilateral 2008   • WISDOM TOOTH EXTRACTION         Family History   Problem Relation Age of Onset   • Osteoarthritis Mother    • Hypertension Mother    • Rheum arthritis Mother    • Bipolar disorder Father    • Coronary artery disease Father    • Hypertension Father    • Migraines Father    • Throat cancer Father    • Seizures Father    • Heart attack Father 69           • Alcohol abuse Brother    • Hypertension Brother    • Cardiomyopathy Brother    • Other Brother         \"Sociopath\"   • Stroke Maternal Grandfather    • Cardiomyopathy Brother    • Hypertension Brother    • No Known Problems Brother         choked to death   • Breast cancer Neg Hx    • Ovarian cancer Neg Hx        Social History     Socioeconomic History   • Marital status:      Spouse name: Not on file   • Number of children: 3   • Years of education: Not on file   • Highest education level: Not on file   Tobacco Use   • Smoking status: Former Smoker     Packs/day: 0.25     Years: 20.00     Pack years: 5.00     Types: Cigarettes, Electronic Cigarette     Start date:      Quit date: 2021     Years since quittin.0   • Smokeless tobacco: Never Used   • Tobacco comment: Quit intermittently for several years at a time; recently in alcohol rehab and restarted smoking but quit 21 and started  Vaping daily since 21   Vaping Use   • Vaping Use: Every day   • Substances: Flavoring   • Devices: Pre-filled or refillable cartridge   Substance and Sexual Activity   • Alcohol use: Not Currently     Comment: recent alcohol rehab admission; discharged 21   • Drug use: Not Currently     Types: Marijuana     Comment: 1-2 times per month   • Sexual activity: Defer     Partners: Male     Birth control/protection: Surgical       Allergies   Allergen " Reactions   • Nitrofurantoin Hives   • Abilify [Aripiprazole] Mental Status Change     Tremors and unable to speak coherently    • Oxycodone Hives         Medication:    Current Facility-Administered Medications:   •  DAPTOmycin (CUBICIN) 500 mg/50 mL in sodium chloride, 8 mg/kg (Adjusted), Intravenous, Q24H, Malik Jay MD  •  ertapenem (INVanz) 1 g/100 mL 0.9% NS VTB (mbp), 1 g, Intravenous, Q24H, Malik Jay MD  •  fentaNYL citrate (PF) (SUBLIMAZE) injection 50 mcg, 50 mcg, Intravenous, Q5 Min PRN, Pepper Hernandez CRNA  •  lactated ringers bolus 500 mL, 500 mL, Intravenous, Once PRN, Pepper Hernandez CRNA  •  lactated ringers infusion, 9 mL/hr, Intravenous, Continuous, Bozena Mendoza DO, Last Rate: 9 mL/hr at 06/04/21 1214, New Bag at 06/04/21 1408  •  ondansetron (ZOFRAN) injection 4 mg, 4 mg, Intravenous, Once PRN, Pepper Hernandez CRNA  •  promethazine (PHENERGAN) suppository 25 mg, 25 mg, Rectal, Once PRN **OR** promethazine (PHENERGAN) tablet 25 mg, 25 mg, Oral, Once PRN, Pepper Hernandez CRNA    Antibiotics:  Anti-Infectives (From admission, onward)    Ordered     Dose/Rate Route Frequency Start Stop    06/04/21 1457  ertapenem (INVanz) 1 g/100 mL 0.9% NS VTB (mbp)     Ordering Provider: Malik Jay MD    1 g  over 30 Minutes Intravenous Every 24 Hours 06/04/21 1430 06/14/21 1429    06/04/21 1457  DAPTOmycin (CUBICIN) 500 mg/50 mL in sodium chloride     Ordering Provider: Malik Jay MD    8 mg/kg × 61.6 kg (Adjusted)  over 30 Minutes Intravenous Every 24 Hours 06/04/21 1415 06/14/21 1414    06/04/21 1148  ceFAZolin in dextrose (ANCEF) IVPB solution 2 g     Ordering Provider: José Miguel Chatman MD    2 g  over 30 Minutes Intravenous Once 06/04/21 1150 06/04/21 1304            Review of Systems:  Constitutional--she has some subjective fevers at home.  HEENT-- No new vision, hearing or throat complaints.  No epistaxis or oral sores.   CV-- No chest pain,   Resp-- No SOB/cough  GI- No  nausea, vomiting, or diarrhea.  No hematochezia, melena, or hematemesis  -- No dysuria, hematuria, or flank pain.  She has a history of recurrent ESBL E. coli UTIs    Heme- No active bruising or bleeding;   MS-- no swelling or pain in the bones or joints of arms/legs.   Neuro-- No acute focal weakness or numbness in the arms or legs.    Skin--No rashes or lesions  Psychiatric-she has a history of bipolar affective disorder      Physical Exam:   Vital Signs  Temp (24hrs), Av.3 °F (36.3 °C), Min:96.8 °F (36 °C), Max:97.8 °F (36.6 °C)    Temp  Min: 96.8 °F (36 °C)  Max: 97.8 °F (36.6 °C)  BP  Min: 106/83  Max: 139/71  Pulse  Min: 62  Max: 65  Resp  Min: 12  Max: 20  SpO2  Min: 96 %  Max: 100 %    GENERAL: Awake and alert, in no acute distress.   HEENT: Normocephalic, atraumatic.  PERRL. EOMI. No conjunctival injection. No icterus. Oropharynx clear without evidence of thrush or exudate.   NECK: Supple without nuchal rigidity.   LYMPH: No cervical, axillary or inguinal lymphadenopathy.  HEART: RRR; No murmur, rubs, gallops.   LUNGS: Clear to auscultation bilaterally without wheezing, rales, rhonchi. Normal respiratory effort. Nonlabored. No dullness.  Chest wall: The right reconstructed breast has a fresh postoperative dressing in place  ABDOMEN: Soft, nontender, nondistended. No rebound or guarding. NO mass or HSM.  EXT:  No cyanosis, clubbing or edema.  :  Without Escudero catheter.  MSK: No focal swelling or erythema   SKIN: Warm and dry without cutaneous eruptions on Inspection/palpation.    NEURO: Oriented to PPT.  5/5 strength bilaterally  PSYCHIATRIC: Normal insight and judgement. Cooperative with PE    Laboratory Data    Results from last 7 days   Lab Units 21  0845   WBC 10*3/mm3 4.90   HEMOGLOBIN g/dL 12.7   HEMATOCRIT % 39.8   PLATELETS 10*3/mm3 311     Results from last 7 days   Lab Units 21  0845   SODIUM mmol/L 141   POTASSIUM mmol/L 4.4   CHLORIDE mmol/L 103   CO2 mmol/L 23.0   BUN mg/dL  14   CREATININE mg/dL 0.80   GLUCOSE mg/dL 80   CALCIUM mg/dL 9.9                             Estimated Creatinine Clearance: 86.4 mL/min (by C-G formula based on SCr of 0.8 mg/dL).      Microbiology:  No results found for: ACANTHNAEG, AFBCX, BPERTUSSISCX, BLOODCX  No results found for: BCIDPCR, CXREFLEX, CSFCX, CULTURETIS  No results found for: CULTURES, HSVCX, URCX  No results found for: EYECULTURE, GCCX, HSVCULTURE, LABHSV  No results found for: LEGIONELLA, MRSACX, MUMPSCX, MYCOPLASCX  No results found for: NOCARDIACX, STOOLCX  No results found for: THROATCX, UNSTIMCULT, URINECX, CULTURE, VZVCULTUR  No results found for: VIRALCULTU, WOUNDCX        Radiology:  Imaging Results (Last 72 Hours)     ** No results found for the last 72 hours. **            Impression:  1.  Right reconstructed breast tissue expander infection-status post removal and replacement with a new tissue expander.  Cultures have been sent and are pending.  I will plan to treat her with intravenous daptomycin and Invanz pending culture data.  She would like to avoid hospital admission and I will plan to see her in follow-up tomorrow,  for acute outpatient care in the office.  She will likely need several days of outpatient intravenous antibiotic therapy.  2.  History of left breast cancer-status post bilateral mastectomy with immediate reconstruction with tissue expanders in 10/15.  3.  History of bipolar affective disorder  4.  History of recurrent ESBL E. coli UTIs      PLAN/RECOMMENDATIONS:   Thank you for asking us to see Maryana Love, I recommend the followin.  Right reconstructed breast cultures  2.  Daptomycin 500 mg IV daily  3.  Invanz 1 g IV daily  4.  Follow-up with me tomorrow Saturday 6/15 at 10:15-this appointment has been    I discussed her complex situation with Dr. Javier Chatman.  I discussed her complex situation with the nursing staff.  I coordinated her care.       Malik Jay MD  2021  14:58  EDT

## 2021-06-04 NOTE — ANESTHESIA PROCEDURE NOTES
Airway  Urgency: elective    Date/Time: 6/4/2021 1:10 PM  Airway not difficult    General Information and Staff    Patient location during procedure: OR  CRNA: Pepper Hernandez CRNA    Indications and Patient Condition  Indications for airway management: airway protection    Preoxygenated: yes  MILS not maintained throughout  Mask difficulty assessment: 1 - vent by mask    Final Airway Details  Final airway type: endotracheal airway      Successful airway: ETT  Cuffed: yes   Successful intubation technique: direct laryngoscopy  Endotracheal tube insertion site: oral  Blade: Geovanna  Blade size: 3  ETT size (mm): 7.0  Cormack-Lehane Classification: grade I - full view of glottis  Placement verified by: chest auscultation and capnometry   Measured from: lips  ETT/EBT  to lips (cm): 20  Number of attempts at approach: 1  Assessment: lips, teeth, and gum same as pre-op and atraumatic intubation    Additional Comments  Symmetric chest rise and fall. +ETCO2 +BBS.

## 2021-06-04 NOTE — OP NOTE
Preoperative diagnosis: 1.  Personal history of breast cancer  2.  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  Open wound right reconstructed breast with a compromised tissue expander    Postoperative diagnosis: 1.  Personal history of breast cancer  2.  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  Open wound right reconstructed breast with a compromised tissue expander    Surgeon: José Miguel Chatman MD    Assistant: CHRISTI Quach    Anesthesia: General    Procedure: 1.  Debridement of right reconstructed breast wound  2.  Removal of right reconstructed breast tissue expander and replacement with a new tissue expander.  It is an [employer] brand volume 400 cc base with 12 cm filled with 350 cc of injectable normal saline serial #[id number].  3.  Capsulorrhaphy    Indication: The patient is a 45-year-old white female who underwent bilateral prepectoralis implant-based breast reconstruction with placement of a tissue expander.  She represented to my office after having been away for many months with a right reconstructed breast wound.  I was concerned of a compromise of the right reconstructed breast tissue expander and therefore recommended exploration possible tissue expander exchange.  The technique potential complications and typical postoperative course were discussed with the patient.  She indicated her understanding wish to proceed.    Findings: 1.  Full-thickness wound right reconstructed breast to the level of the tissue expander    Description: The patient was taken from preoperative holding to the operating room after informed consent was signed and on the chart and placed under general anesthesia successfully.  Prior to induction of anesthesia, the patient received a prophylactic dose of antibiotics and had bilateral lower extremity sequential compression devices in place and operational.  She was placed supine on the operating room table.  She did pillow placed beneath her knees.  Her arms  are gently abducted to 90 degrees and she had ulnar nerve padding.  Her chest wall was prepped and draped in the usual sterile fashion.  After proper identifying the patient the patient's problem, the right reconstructed breast wound was marked with a marking pen into an ellipse along the old mastectomy scar.  This is subsequently scored with a 10 blade and the tissue was de-epithelialized.  The wound was in the center of the de-epithelialized skin.  This was marked and underwent tangential excision.  Upon doing so, it was noted that the wound was full-thickness.  The remainder of the mastectomy incision was made with a 10 blade and electrocautery.  The tissue expander was removed.  The pocket was irrigated copiously with antibiotic and Betadine solution.  Tissue was sent to microbiology for culture.  A new tissue expander was brought in the operative field.  It was placed into the subcutaneous space and secured at the medial central and lateral suture tabs with 2-0 silk suture.  The remaining capsule was closed with 3-0 Vicryl suture in a horizontal mattress fashion.  The skin was closed with 3-0 Monocryl suture in a deep dermal buried interrupted fashion and 3-0 strata fix suture in an intracuticular fashion.  Skin glue was applied.  Kerlix fluffs and a surgical bra were applied.  The case was turned over to anesthesia which point the patient was awoken from general anesthesia successfully and taken to PACU in stable condition.  I was present for the entire procedure.  All counts were correct.    Estimated blood loss: Minimal    Drains: None    Complications: None immediate

## 2021-06-04 NOTE — H&P
[facility] Pre-operative History and Physical      Chief complaint: wound to right breast surgical site    Subjective:  Patient is a 45 y.o.female presents with history of breast cancer left breast diagnosed 8/2015.  Underwent bilateral mastectomy with immediate reconstruction with breast expanders placed bilaterally 10/2015.  Did well and eventually underwent conversion of breast expanders to permanent implants several months later.  She is s/p nipple reconstruction 2016.  She states she noticed a wound to her right breast a few days ago.  She has had some clear drainage from wound site, tenderness to touch and redness to area.  Denies any fever and chills. She is here today for scheduled RIGHT BREAST COMPLEX CLOSURE, POSSIBLE TISSUE EXPANDER EXCHANGE.     Review of Systems:  General ROS: negative for fever, chills, weakness, dizziness, headache, fatigue, weight changes  Cardiovascular ROS: no chest pain or dyspnea on exertion  Respiratory ROS: no cough, shortness of breath, or wheezing      Allergies:   Allergies   Allergen Reactions   • Nitrofurantoin Hives   • Abilify [Aripiprazole] Mental Status Change     Tremors and unable to speak coherently    • Oxycodone Hives   Latex: no known allergy  Contrast Dye:  no known allergy      Home Meds    Medications Prior to Admission   Medication Sig Dispense Refill Last Dose   • acamprosate (CAMPRAL) 333 MG EC tablet Take 666 mg by mouth 3 (Three) Times a Day.   6/3/2021 at Unknown time   • buPROPion XL (WELLBUTRIN XL) 150 MG 24 hr tablet Take 450 mg by mouth Daily.   6/3/2021 at Unknown time   • Cholecalciferol (VITAMIN D3 PO) Take 1 dose by mouth Every Morning.   6/3/2021 at Unknown time   • lamoTRIgine (LaMICtal) 150 MG tablet Take 300 mg by mouth Daily. Take 2 tablets daily   6/3/2021 at Unknown time   • topiramate (TOPAMAX) 25 MG capsule (sprinkle) Take 25 mg by mouth 2 (Two) Times a Day.   6/3/2021 at Unknown time   • Naltrexone (VIVITROL IM) Inject 1  dose into the appropriate muscle as directed by prescriber Every 30 (Thirty) Days.   5/13/2021     PMH:   Past Medical History:   Diagnosis Date   • Allergic rhinitis    • Anxiety disorder     Description: Long-term.   • Bipolar affective disorder (CMS/HCC)     Dx 2014- [facility].   • Chronic urinary tract infection     Description: Diagnosed 2008.  Cystoscopy normal 1/10/13, other than mild urethral inflammation. Normal diagnostic cystourethroscopy with urodynamics at  4/15.   • Colon polyps     Dx 4/19- fragments of tubular adenoma (ascending colon)- Darlene   • Cyst of ovary     Description: Left (2.5 cm) dx by CT Scan 9/6/13.   • Depression     Description: Long-term.   • Eczema    • H/O abnormal mammogram     08/18/15-cat 5   • Hearing loss    • Hemangioma of liver     Description: Diagnosed by CT scan 9/13   • History of alcohol abuse 05/2021    recent time in rehab -discharged 5/18/21; currently on medications (Vivitrol and Campral)   • History of Papanicolaou smear of cervix 2008    normal per patient   • History of UTI     feb 2021-last UTI, required IV antibiotics and Infectious disease MD visit    • History of varicella    • Malignant neoplasm of upper-outer quadrant of left female breast (CMS/HCC)     Description: dx 8/15- infiltrating and in situ low-grade ductal adenocarcinoma (Stage 2A, receptor +. HER2 neg)      • Open breast wound     right breast from surgery in dec 2020   • Tremor    • Wears contact lenses    • Wears glasses      PSH:    Past Surgical History:   Procedure Laterality Date   • ABDOMINOPLASTY     • AUGMENTATION MAMMAPLASTY     • BELPHAROPTOSIS REPAIR Bilateral approx 11/2017   • BLADDER SURGERY      6/2/15- part of mesh removed   • BREAST BIOPSY Left 08/2005    cancer   • BREAST IMPLANT SURGERY Right 6/23/2016    Procedure: RIGHT BREAST RECONSTRUCTION, REVISION;  Surgeon: José Miguel Chatman MD;  Location: [facility] OR;  Service:    • BREAST RECONSTRUCTION, BREAST TISSUE EXPANDER  "INSERTION Bilateral 12/15/2020    Procedure: CONVERSION OF RECONSTRUCTED BREASTS TISSUE EXPANDER PLACEMENT BILATERAL;  Surgeon: José Miguel Chatman MD;  Location:  [facility] OR;  Service: Plastics;  Laterality: Bilateral;   • BREAST SURGERY     • COSMETIC SURGERY  2020   • EYE SURGERY Right approx 10/2017    Stye removal   • FAT GRAFTING Bilateral 2016    Procedure: BILATERAL BREAST FAT GRAFTING;  Surgeon: José Miguel Chatman MD;  Location:  [facility] OR;  Service:    • GASTRIC SLEEVE LAPAROSCOPIC      12/29/15   • LASIK Left approx 2018   • MASTECTOMY COMPLETE / SIMPLE W/ SENTINEL NODE BIOPSY Bilateral 10/05/2015    with reconstruction (sentinel node bx neg)   • NIPPLE RECONSTRUCTION Bilateral 2016    Procedure: BILATERAL NIPPLE/AEROLA RECONSTRUCTION WITH FLAP AND GRAFT;  Surgeon: José Miguel Chatman MD;  Location:  [facility] OR;  Service:    • SINUS SURGERY  2012     endoscopic sinusotomies and polypectomies   • TOTAL ABDOMINAL HYSTERECTOMY  2008   • TUBAL ABDOMINAL LIGATION Bilateral 2008   • WISDOM TOOTH EXTRACTION       Immunization History: pneumonia=no    Influenza=yes   Tetanus=unknown  Covid-19: yes x 1 (second dose due 2021)      Social History:  Social History     Tobacco Use   • Smoking status: Former Smoker     Packs/day: 0.25     Years: 20.00     Pack years: 5.00     Types: Cigarettes, Electronic Cigarette     Start date:      Quit date: 2021     Years since quittin.0   • Smokeless tobacco: Never Used   • Tobacco comment: Quit intermittently for several years at a time; recently in alcohol rehab and restarted smoking but quit 21 and started  Vaping daily since 21   Substance Use Topics   • Alcohol use: Not Currently     Comment: recent alcohol rehab admission; discharged 21         Physical Exam:/83 (BP Location: Right arm, Patient Position: Lying)   Pulse 65   Temp 97.8 °F (36.6 °C) (Tympanic)   Resp 18   Ht 165.1 cm (65\")   Wt 68.5 kg (151 lb)   LMP  (LMP " Unknown) Comment: LAST MAMOGRAM 8/2015  SpO2 96%   BMI 25.13 kg/m²       General Appearance:    Alert, cooperative, no distress, appears stated age   Head:    Normocephalic, without obvious abnormality, atraumatic   Lungs:     Clear to auscultation bilaterally, respirations unlabored    Heart: Regular rate and rhythm, S1 and S2 normal, no murmur, rub    or gallop    Abdomen:    Soft without tenderness   Breast Exam:    deferred   Genitalia:    deferred   Extremities:   Extremities normal, atraumatic, no cyanosis or edema   Skin:   Skin color, texture, turgor normal, no rashes   Neurologic:   Grossly intact     Results Review:   LABS:  Lab Results   Component Value Date    WBC 4.90 06/03/2021    HGB 12.7 06/03/2021    HCT 39.8 06/03/2021    MCV 98.3 (H) 06/03/2021     06/03/2021    NEUTROABS 3.74 04/02/2021    GLUCOSE 80 06/03/2021    BUN 14 06/03/2021    CREATININE 0.80 06/03/2021    EGFRIFNONA 78 06/03/2021     06/03/2021    K 4.4 06/03/2021     06/03/2021    CO2 23.0 06/03/2021    CALCIUM 9.9 06/03/2021    ALBUMIN 4.00 04/02/2021    AST 21 04/02/2021    ALT 10 04/02/2021    BILITOT <0.2 04/02/2021       RADIOLOGY:  Imaging Results (Last 72 Hours)     ** No results found for the last 72 hours. **           Cancer Staging (if applicable):  Cancer Patient: __ yes __no __unknown; If yes, clinical stage T:__ N:__M:__, stage group    Impression:  OPEN WOUND OF RIGHT BREAST; HISTORY OF BREAST CANCER; ACQUIRED ABSENCE OF BREAST AND NIPPLE BILATERALLY    Plan:  RIGHT BREAST COMPLEX CLOSURE, POSSIBLE TISSUE EXPANDER EXCHANGE    CHRISTI uLi 6/4/2021 12:53 EDT

## 2021-06-04 NOTE — ANESTHESIA POSTPROCEDURE EVALUATION
Patient: Maryana Love    Procedure Summary     Date: 06/04/21 Room / [facility]:  [facility]  [facility]    Anesthesia Start: 1304 Anesthesia Stop: 1434    Procedure: RIGHT BREAST COMPLEX CLOSURE, TISSUE EXPANDER EXCHANGE (Right Breast) Diagnosis:       Acquired absence of breast and absent nipple, bilateral      Personal history of breast cancer      Open wound of breast    Surgeons: José Miguel Chatman MD Provider: Jorgito Garrett MD    Anesthesia Type: general ASA Status: 2          Anesthesia Type: general    Vitals  Vitals Value Taken Time   /76 06/04/21 1430   Temp     Pulse 63 06/04/21 1433   Resp     SpO2     Vitals shown include unvalidated device data.    Temp 97.8F  RR 16  SpO2 98%    Post Anesthesia Care and Evaluation    Patient location during evaluation: PACU  Patient participation: complete - patient participated  Level of consciousness: awake and alert  Pain management: adequate  Airway patency: patent  Anesthetic complications: No anesthetic complications  PONV Status: none  Cardiovascular status: hemodynamically stable and acceptable  Respiratory status: nonlabored ventilation, acceptable and room air  Hydration status: acceptable

## 2021-06-04 NOTE — BRIEF OP NOTE
BREAST IMPLANT REVISION AND/OR REMOVAL  Progress Note    Maryana Love  6/4/2021    Pre-op Diagnosis:   * Acquired absence of breast and absent nipple, bilateral [Z90.13]     * Personal history of breast cancer [Z85.3]     * Open wound of breast [S21.009A]         Post-Op Diagnosis Codes:     * Acquired absence of breast and absent nipple, bilateral [Z90.13]     * Personal history of breast cancer [Z85.3]     * Open wound of breast [S21.009A]    Procedure/CPT® Codes:  MS TISSUE EXPANDER PLACEMENT BREAST RECONSTRUCTION [30457]  MS REVISION JOEL-IMPLANT CAPSULE BREAST [34837]      Procedure(s):  RIGHT BREAST COMPLEX CLOSURE, POSSIBLE TISSUE EXPANDER EXCHANGE    Surgeon(s):  José Miguel Chatman MD    Anesthesia: General    Staff:   Circulator: Priyanka Fraga RN  Scrub Person: Js Hernandez; Oliva Dover  Assistant: Ely Guevara PA-C  Assistant: Ely Guevara PA-C      Estimated Blood Loss: minimal    Urine Voided: * No values recorded between 6/4/2021  1:00 PM and 6/4/2021  2:08 PM *    Specimens:                Specimens     ID Source Type Tests Collected By Collected At Frozen?    1 Breast, Right Tissue · FUNGAL CULTURE  · GRAM STAIN  · TISSUE / BONE CULTURE  · AFB CULTURE  · ANAEROBIC CULTURE 10 DAY INCUBATION   José Miguel Chatman MD 6/4/21 8272     Description: right breast reconstructed wound    This specimen was not marked as sent.                Drains:   [REMOVED] Closed/Suction Drain Inferior;Right Breast Other (Comment) 15 Fr. (Removed)       [REMOVED] Closed/Suction Drain Inferior;Right Breast Other (Comment) 15 Fr. (Removed)       [REMOVED] Closed/Suction Drain Inferior;Left Breast Other (Comment) 15 Fr. (Removed)       [REMOVED] Closed/Suction Drain Inferior;Left Breast Other (Comment) 15 Fr. (Removed)       Findings: absent breast    Complications: none immediate    Assistant: Ely Guevara PA-C  was responsible for performing the following activities: Retraction, Suction,  Irrigation, Suturing, Closing and Placing Dressing and their skilled assistance was necessary for the success of this case.    José Miguel Chatman MD     Date: 6/4/2021  Time: 14:09 EDT

## 2021-06-04 NOTE — ANESTHESIA PREPROCEDURE EVALUATION
Anesthesia Evaluation     Patient summary reviewed   no history of anesthetic complications:  NPO Solid Status: > 8 hours  NPO Liquid Status: > 2 hours           Airway   Mallampati: II  TM distance: >3 FB  Neck ROM: full  no difficulty expected  Dental - normal exam     Pulmonary - normal exam   (+) a smoker,   (-) asthma, shortness of breath  Cardiovascular   Exercise tolerance: good (4-7 METS)    Rhythm: regular  Rate: normal    (-) pacemaker, hypertension, angina, murmur, cardiac stents      Neuro/Psych  (+) psychiatric history Anxiety and Bipolar,     (-) TIA, CVA  GI/Hepatic/Renal/Endo    (-) GERD, diabetesMorbid obesity: Bariactric surgery 2/2015  gastric sleeve.  lost 45 pounds approx  denies reflux.    Musculoskeletal     Abdominal    Substance History   (+) alcohol use (hx of abuse now on vivitrol and campral),      OB/GYN          Other      history of cancer (breast)    ROS/Med Hx Other: Allergic rhinitis  Gr1v mac3  Hx bariatric surgery - 2015 sleeve  hgb   12.7 k 4.4  vivitrol 5/13  campral 6/3                  Anesthesia Plan    ASA 2     general   (Risks and benefits of general anesthesia discussed with patient (including MI, CVA, death, recall, aspiration), questions answered, agreeable to proceed.  preop Versed for anxiolysis  Multimodal analgesia - ketamine )  intravenous induction     Anesthetic plan, all risks, benefits, and alternatives have been provided, discussed and informed consent has been obtained with: patient.    Plan discussed with CRNA.

## 2021-06-07 LAB
BACTERIA SPEC AEROBE CULT: NORMAL
GRAM STN SPEC: NORMAL
GRAM STN SPEC: NORMAL

## 2021-06-09 LAB — BACTERIA SPEC ANAEROBE CULT: ABNORMAL

## 2021-06-11 ENCOUNTER — TRANSCRIBE ORDERS (OUTPATIENT)
Dept: ADMINISTRATIVE | Facility: HOSPITAL | Age: 46
End: 2021-06-11

## 2021-06-11 DIAGNOSIS — L03.313 CELLULITIS OF CHEST WALL: Primary | ICD-10-CM

## 2021-06-14 ENCOUNTER — HOSPITAL ENCOUNTER (OUTPATIENT)
Dept: INFUSION THERAPY | Facility: HOSPITAL | Age: 46
Discharge: HOME | End: 2021-06-14
Admitting: INTERNAL MEDICINE

## 2021-06-14 ENCOUNTER — HOME HEALTH ADMISSION (OUTPATIENT)
Dept: HOME HEALTH SERVICES | Facility: HOME HEALTHCARE | Age: 46
End: 2021-06-14

## 2021-06-14 VITALS
HEART RATE: 72 BPM | HEIGHT: 65 IN | TEMPERATURE: 98.7 F | RESPIRATION RATE: 18 BRPM | OXYGEN SATURATION: 97 % | WEIGHT: 138 LBS | BODY MASS INDEX: 22.99 KG/M2 | DIASTOLIC BLOOD PRESSURE: 84 MMHG | SYSTOLIC BLOOD PRESSURE: 124 MMHG

## 2021-06-14 PROCEDURE — C1751 CATH, INF, PER/CENT/MIDLINE: HCPCS

## 2021-06-14 PROCEDURE — C1894 INTRO/SHEATH, NON-LASER: HCPCS

## 2021-06-14 RX ORDER — SODIUM CHLORIDE 0.9 % (FLUSH) 0.9 %
10 SYRINGE (ML) INJECTION EVERY 12 HOURS SCHEDULED
Status: DISCONTINUED | OUTPATIENT
Start: 2021-06-14 | End: 2021-06-16 | Stop reason: HOSPADM

## 2021-06-14 RX ORDER — SODIUM CHLORIDE 0.9 % (FLUSH) 0.9 %
10 SYRINGE (ML) INJECTION AS NEEDED
Status: DISCONTINUED | OUTPATIENT
Start: 2021-06-14 | End: 2021-06-16 | Stop reason: HOSPADM

## 2021-06-14 NOTE — NURSING NOTE
1445- Arrived for PICC line placement. Has peripheral IV rt forearm with occlusive dressing.    1531- PICC placed rt upper arm, occlusive dressing , CDI. Pt without c/o's. Rt forearm IV removed by PICC nurse. Pt discharged to home.

## 2021-06-15 ENCOUNTER — LAB (OUTPATIENT)
Dept: LAB | Facility: HOSPITAL | Age: 46
End: 2021-06-15

## 2021-06-15 ENCOUNTER — TRANSCRIBE ORDERS (OUTPATIENT)
Dept: LAB | Facility: HOSPITAL | Age: 46
End: 2021-06-15

## 2021-06-15 DIAGNOSIS — L03.313 CELLULITIS OF CHEST WALL: Primary | ICD-10-CM

## 2021-06-15 DIAGNOSIS — T85.79XA GRANULOMA SECONDARY TO INTUBATION, INITIAL ENCOUNTER (HCC): ICD-10-CM

## 2021-06-15 DIAGNOSIS — L02.213 ABSCESS OF CHEST WALL: ICD-10-CM

## 2021-06-15 DIAGNOSIS — L03.313 CELLULITIS OF CHEST WALL: ICD-10-CM

## 2021-06-15 LAB
ALBUMIN SERPL-MCNC: 4 G/DL (ref 3.5–5.2)
ALBUMIN/GLOB SERPL: 1.6 G/DL
ALP SERPL-CCNC: 58 U/L (ref 39–117)
ALT SERPL W P-5'-P-CCNC: 12 U/L (ref 1–33)
ANION GAP SERPL CALCULATED.3IONS-SCNC: 10 MMOL/L (ref 5–15)
AST SERPL-CCNC: 18 U/L (ref 1–32)
BASOPHILS # BLD AUTO: 0.08 10*3/MM3 (ref 0–0.2)
BASOPHILS NFR BLD AUTO: 1.1 % (ref 0–1.5)
BILIRUB SERPL-MCNC: <0.2 MG/DL (ref 0–1.2)
BUN SERPL-MCNC: 17 MG/DL (ref 6–20)
BUN/CREAT SERPL: 25 (ref 7–25)
CALCIUM SPEC-SCNC: 9.5 MG/DL (ref 8.6–10.5)
CHLORIDE SERPL-SCNC: 104 MMOL/L (ref 98–107)
CO2 SERPL-SCNC: 24 MMOL/L (ref 22–29)
CREAT SERPL-MCNC: 0.68 MG/DL (ref 0.57–1)
CRP SERPL-MCNC: <0.3 MG/DL (ref 0–0.5)
DEPRECATED RDW RBC AUTO: 47.5 FL (ref 37–54)
EOSINOPHIL # BLD AUTO: 0.39 10*3/MM3 (ref 0–0.4)
EOSINOPHIL NFR BLD AUTO: 5.2 % (ref 0.3–6.2)
ERYTHROCYTE [DISTWIDTH] IN BLOOD BY AUTOMATED COUNT: 13.2 % (ref 12.3–15.4)
ERYTHROCYTE [SEDIMENTATION RATE] IN BLOOD: 6 MM/HR (ref 0–20)
GFR SERPL CREATININE-BSD FRML MDRD: 94 ML/MIN/1.73
GLOBULIN UR ELPH-MCNC: 2.5 GM/DL
GLUCOSE SERPL-MCNC: 71 MG/DL (ref 65–99)
HCT VFR BLD AUTO: 38.7 % (ref 34–46.6)
HGB BLD-MCNC: 12.4 G/DL (ref 12–15.9)
IMM GRANULOCYTES # BLD AUTO: 0.01 10*3/MM3 (ref 0–0.05)
IMM GRANULOCYTES NFR BLD AUTO: 0.1 % (ref 0–0.5)
LYMPHOCYTES # BLD AUTO: 2.32 10*3/MM3 (ref 0.7–3.1)
LYMPHOCYTES NFR BLD AUTO: 30.9 % (ref 19.6–45.3)
MCH RBC QN AUTO: 31.2 PG (ref 26.6–33)
MCHC RBC AUTO-ENTMCNC: 32 G/DL (ref 31.5–35.7)
MCV RBC AUTO: 97.5 FL (ref 79–97)
MONOCYTES # BLD AUTO: 0.5 10*3/MM3 (ref 0.1–0.9)
MONOCYTES NFR BLD AUTO: 6.6 % (ref 5–12)
NEUTROPHILS NFR BLD AUTO: 4.22 10*3/MM3 (ref 1.7–7)
NEUTROPHILS NFR BLD AUTO: 56.1 % (ref 42.7–76)
NRBC BLD AUTO-RTO: 0 /100 WBC (ref 0–0.2)
PLATELET # BLD AUTO: 325 10*3/MM3 (ref 140–450)
PMV BLD AUTO: 10.7 FL (ref 6–12)
POTASSIUM SERPL-SCNC: 3.9 MMOL/L (ref 3.5–5.2)
PROT SERPL-MCNC: 6.5 G/DL (ref 6–8.5)
RBC # BLD AUTO: 3.97 10*6/MM3 (ref 3.77–5.28)
SODIUM SERPL-SCNC: 138 MMOL/L (ref 136–145)
WBC # BLD AUTO: 7.52 10*3/MM3 (ref 3.4–10.8)

## 2021-06-15 PROCEDURE — 36415 COLL VENOUS BLD VENIPUNCTURE: CPT

## 2021-06-15 PROCEDURE — 86140 C-REACTIVE PROTEIN: CPT

## 2021-06-15 PROCEDURE — 85652 RBC SED RATE AUTOMATED: CPT

## 2021-06-15 PROCEDURE — 80053 COMPREHEN METABOLIC PANEL: CPT

## 2021-06-15 PROCEDURE — 85025 COMPLETE CBC W/AUTO DIFF WBC: CPT

## 2021-06-28 ENCOUNTER — LAB (OUTPATIENT)
Dept: LAB | Facility: HOSPITAL | Age: 46
End: 2021-06-28

## 2021-06-28 ENCOUNTER — TRANSCRIBE ORDERS (OUTPATIENT)
Dept: LAB | Facility: HOSPITAL | Age: 46
End: 2021-06-28

## 2021-06-28 DIAGNOSIS — L02.213 ABSCESS OF CHEST WALL: ICD-10-CM

## 2021-06-28 DIAGNOSIS — T85.79XS GRANULOMA SECONDARY TO INTUBATION, SEQUELA: Primary | ICD-10-CM

## 2021-06-28 DIAGNOSIS — L03.313 CELLULITIS OF CHEST WALL: ICD-10-CM

## 2021-06-28 DIAGNOSIS — T85.79XS GRANULOMA SECONDARY TO INTUBATION, SEQUELA: ICD-10-CM

## 2021-06-28 LAB
ALBUMIN SERPL-MCNC: 4 G/DL (ref 3.5–5.2)
ALBUMIN/GLOB SERPL: 1.7 G/DL
ALP SERPL-CCNC: 61 U/L (ref 39–117)
ALT SERPL W P-5'-P-CCNC: 13 U/L (ref 1–33)
ANION GAP SERPL CALCULATED.3IONS-SCNC: 9 MMOL/L (ref 5–15)
AST SERPL-CCNC: 18 U/L (ref 1–32)
BASOPHILS # BLD AUTO: 0.09 10*3/MM3 (ref 0–0.2)
BASOPHILS NFR BLD AUTO: 1.8 % (ref 0–1.5)
BILIRUB SERPL-MCNC: 0.3 MG/DL (ref 0–1.2)
BUN SERPL-MCNC: 10 MG/DL (ref 6–20)
BUN/CREAT SERPL: 14.1 (ref 7–25)
CALCIUM SPEC-SCNC: 9 MG/DL (ref 8.6–10.5)
CHLORIDE SERPL-SCNC: 105 MMOL/L (ref 98–107)
CO2 SERPL-SCNC: 25 MMOL/L (ref 22–29)
CREAT SERPL-MCNC: 0.71 MG/DL (ref 0.57–1)
CRP SERPL-MCNC: <0.3 MG/DL (ref 0–0.5)
DEPRECATED RDW RBC AUTO: 47.7 FL (ref 37–54)
EOSINOPHIL # BLD AUTO: 0.48 10*3/MM3 (ref 0–0.4)
EOSINOPHIL NFR BLD AUTO: 9.9 % (ref 0.3–6.2)
ERYTHROCYTE [DISTWIDTH] IN BLOOD BY AUTOMATED COUNT: 13.2 % (ref 12.3–15.4)
ERYTHROCYTE [SEDIMENTATION RATE] IN BLOOD: 4 MM/HR (ref 0–20)
GFR SERPL CREATININE-BSD FRML MDRD: 89 ML/MIN/1.73
GLOBULIN UR ELPH-MCNC: 2.4 GM/DL
GLUCOSE SERPL-MCNC: 92 MG/DL (ref 65–99)
HCT VFR BLD AUTO: 39.9 % (ref 34–46.6)
HGB BLD-MCNC: 12.9 G/DL (ref 12–15.9)
IMM GRANULOCYTES # BLD AUTO: 0.01 10*3/MM3 (ref 0–0.05)
IMM GRANULOCYTES NFR BLD AUTO: 0.2 % (ref 0–0.5)
LYMPHOCYTES # BLD AUTO: 1.74 10*3/MM3 (ref 0.7–3.1)
LYMPHOCYTES NFR BLD AUTO: 35.7 % (ref 19.6–45.3)
MCH RBC QN AUTO: 31.9 PG (ref 26.6–33)
MCHC RBC AUTO-ENTMCNC: 32.3 G/DL (ref 31.5–35.7)
MCV RBC AUTO: 98.8 FL (ref 79–97)
MONOCYTES # BLD AUTO: 0.35 10*3/MM3 (ref 0.1–0.9)
MONOCYTES NFR BLD AUTO: 7.2 % (ref 5–12)
NEUTROPHILS NFR BLD AUTO: 2.2 10*3/MM3 (ref 1.7–7)
NEUTROPHILS NFR BLD AUTO: 45.2 % (ref 42.7–76)
NRBC BLD AUTO-RTO: 0 /100 WBC (ref 0–0.2)
PLATELET # BLD AUTO: 310 10*3/MM3 (ref 140–450)
PMV BLD AUTO: 9.7 FL (ref 6–12)
POTASSIUM SERPL-SCNC: 4.1 MMOL/L (ref 3.5–5.2)
PROT SERPL-MCNC: 6.4 G/DL (ref 6–8.5)
RBC # BLD AUTO: 4.04 10*6/MM3 (ref 3.77–5.28)
SODIUM SERPL-SCNC: 139 MMOL/L (ref 136–145)
WBC # BLD AUTO: 4.87 10*3/MM3 (ref 3.4–10.8)

## 2021-06-28 PROCEDURE — 36415 COLL VENOUS BLD VENIPUNCTURE: CPT

## 2021-06-28 PROCEDURE — 85025 COMPLETE CBC W/AUTO DIFF WBC: CPT

## 2021-06-28 PROCEDURE — 86140 C-REACTIVE PROTEIN: CPT

## 2021-06-28 PROCEDURE — 80053 COMPREHEN METABOLIC PANEL: CPT

## 2021-06-28 PROCEDURE — 85652 RBC SED RATE AUTOMATED: CPT

## 2021-07-16 LAB
FUNGUS WND CULT: NORMAL
MYCOBACTERIUM SPEC CULT: NORMAL
NIGHT BLUE STAIN TISS: NORMAL

## 2021-07-23 ENCOUNTER — TRANSCRIBE ORDERS (OUTPATIENT)
Dept: LAB | Facility: HOSPITAL | Age: 46
End: 2021-07-23

## 2021-07-23 ENCOUNTER — LAB (OUTPATIENT)
Dept: LAB | Facility: HOSPITAL | Age: 46
End: 2021-07-23

## 2021-07-23 DIAGNOSIS — L02.213 ABSCESS OF CHEST WALL: ICD-10-CM

## 2021-07-23 DIAGNOSIS — R30.0 DYSURIA: Primary | ICD-10-CM

## 2021-07-23 DIAGNOSIS — T85.79XS GRANULOMA SECONDARY TO INTUBATION, SEQUELA: ICD-10-CM

## 2021-07-23 DIAGNOSIS — R30.0 DYSURIA: ICD-10-CM

## 2021-07-23 LAB
ALBUMIN SERPL-MCNC: 4.1 G/DL (ref 3.5–5.2)
ALBUMIN/GLOB SERPL: 1.6 G/DL
ALP SERPL-CCNC: 63 U/L (ref 39–117)
ALT SERPL W P-5'-P-CCNC: 13 U/L (ref 1–33)
ANION GAP SERPL CALCULATED.3IONS-SCNC: 14 MMOL/L (ref 5–15)
AST SERPL-CCNC: 21 U/L (ref 1–32)
BACTERIA UR QL AUTO: ABNORMAL /HPF
BASOPHILS # BLD AUTO: 0.08 10*3/MM3 (ref 0–0.2)
BASOPHILS NFR BLD AUTO: 0.9 % (ref 0–1.5)
BILIRUB SERPL-MCNC: 0.4 MG/DL (ref 0–1.2)
BILIRUB UR QL STRIP: NEGATIVE
BUN SERPL-MCNC: 15 MG/DL (ref 6–20)
BUN/CREAT SERPL: 19.5 (ref 7–25)
CALCIUM SPEC-SCNC: 9.4 MG/DL (ref 8.6–10.5)
CHLORIDE SERPL-SCNC: 104 MMOL/L (ref 98–107)
CLARITY UR: CLEAR
CO2 SERPL-SCNC: 19 MMOL/L (ref 22–29)
COLOR UR: YELLOW
CREAT SERPL-MCNC: 0.77 MG/DL (ref 0.57–1)
CRP SERPL-MCNC: <0.3 MG/DL (ref 0–0.5)
DEPRECATED RDW RBC AUTO: 46.2 FL (ref 37–54)
EOSINOPHIL # BLD AUTO: 0.48 10*3/MM3 (ref 0–0.4)
EOSINOPHIL NFR BLD AUTO: 5.4 % (ref 0.3–6.2)
ERYTHROCYTE [DISTWIDTH] IN BLOOD BY AUTOMATED COUNT: 12.9 % (ref 12.3–15.4)
ERYTHROCYTE [SEDIMENTATION RATE] IN BLOOD: 8 MM/HR (ref 0–20)
GFR SERPL CREATININE-BSD FRML MDRD: 81 ML/MIN/1.73
GLOBULIN UR ELPH-MCNC: 2.6 GM/DL
GLUCOSE SERPL-MCNC: 85 MG/DL (ref 65–99)
GLUCOSE UR STRIP-MCNC: NEGATIVE MG/DL
HCT VFR BLD AUTO: 38.2 % (ref 34–46.6)
HGB BLD-MCNC: 12.6 G/DL (ref 12–15.9)
HGB UR QL STRIP.AUTO: NEGATIVE
HYALINE CASTS UR QL AUTO: ABNORMAL /LPF
IMM GRANULOCYTES # BLD AUTO: 0.01 10*3/MM3 (ref 0–0.05)
IMM GRANULOCYTES NFR BLD AUTO: 0.1 % (ref 0–0.5)
KETONES UR QL STRIP: NEGATIVE
LEUKOCYTE ESTERASE UR QL STRIP.AUTO: NEGATIVE
LYMPHOCYTES # BLD AUTO: 2.54 10*3/MM3 (ref 0.7–3.1)
LYMPHOCYTES NFR BLD AUTO: 28.6 % (ref 19.6–45.3)
MCH RBC QN AUTO: 32 PG (ref 26.6–33)
MCHC RBC AUTO-ENTMCNC: 33 G/DL (ref 31.5–35.7)
MCV RBC AUTO: 97 FL (ref 79–97)
MONOCYTES # BLD AUTO: 0.52 10*3/MM3 (ref 0.1–0.9)
MONOCYTES NFR BLD AUTO: 5.9 % (ref 5–12)
NEUTROPHILS NFR BLD AUTO: 5.25 10*3/MM3 (ref 1.7–7)
NEUTROPHILS NFR BLD AUTO: 59.1 % (ref 42.7–76)
NITRITE UR QL STRIP: NEGATIVE
NRBC BLD AUTO-RTO: 0 /100 WBC (ref 0–0.2)
PH UR STRIP.AUTO: 6.5 [PH] (ref 5–8)
PLATELET # BLD AUTO: 332 10*3/MM3 (ref 140–450)
PMV BLD AUTO: 10.5 FL (ref 6–12)
POTASSIUM SERPL-SCNC: 3.5 MMOL/L (ref 3.5–5.2)
PROT SERPL-MCNC: 6.7 G/DL (ref 6–8.5)
PROT UR QL STRIP: NEGATIVE
RBC # BLD AUTO: 3.94 10*6/MM3 (ref 3.77–5.28)
RBC # UR: ABNORMAL /HPF
REF LAB TEST METHOD: ABNORMAL
SODIUM SERPL-SCNC: 137 MMOL/L (ref 136–145)
SP GR UR STRIP: 1.01 (ref 1–1.03)
SQUAMOUS #/AREA URNS HPF: ABNORMAL /HPF
UROBILINOGEN UR QL STRIP: NORMAL
WBC # BLD AUTO: 8.88 10*3/MM3 (ref 3.4–10.8)
WBC UR QL AUTO: ABNORMAL /HPF

## 2021-07-23 PROCEDURE — 81001 URINALYSIS AUTO W/SCOPE: CPT

## 2021-07-23 PROCEDURE — 86140 C-REACTIVE PROTEIN: CPT

## 2021-07-23 PROCEDURE — 85025 COMPLETE CBC W/AUTO DIFF WBC: CPT

## 2021-07-23 PROCEDURE — 87086 URINE CULTURE/COLONY COUNT: CPT

## 2021-07-23 PROCEDURE — 80053 COMPREHEN METABOLIC PANEL: CPT

## 2021-07-23 PROCEDURE — 85652 RBC SED RATE AUTOMATED: CPT

## 2021-07-23 PROCEDURE — 36415 COLL VENOUS BLD VENIPUNCTURE: CPT

## 2021-07-24 LAB — BACTERIA SPEC AEROBE CULT: NO GROWTH

## 2021-08-16 ENCOUNTER — TELEPHONE (OUTPATIENT)
Dept: INTERNAL MEDICINE | Facility: CLINIC | Age: 46
End: 2021-08-16

## 2021-08-16 NOTE — TELEPHONE ENCOUNTER
Patient called, stating she is having extreme back pain. Patient states she has taken Advil but it does not seem to be helping. Patient state this has been going on since last Tuesday. Patient states she also woke up with both of her eyes completely shut with crust and painful to touch. Patient would like to be seen today if possible.

## 2021-08-17 ENCOUNTER — OFFICE VISIT (OUTPATIENT)
Dept: INTERNAL MEDICINE | Facility: CLINIC | Age: 46
End: 2021-08-17

## 2021-08-17 ENCOUNTER — TELEPHONE (OUTPATIENT)
Dept: INTERNAL MEDICINE | Facility: CLINIC | Age: 46
End: 2021-08-17

## 2021-08-17 ENCOUNTER — LAB (OUTPATIENT)
Dept: LAB | Facility: HOSPITAL | Age: 46
End: 2021-08-17

## 2021-08-17 VITALS
TEMPERATURE: 97.1 F | DIASTOLIC BLOOD PRESSURE: 54 MMHG | SYSTOLIC BLOOD PRESSURE: 111 MMHG | HEART RATE: 77 BPM | WEIGHT: 136.44 LBS | BODY MASS INDEX: 22.7 KG/M2 | RESPIRATION RATE: 20 BRPM

## 2021-08-17 DIAGNOSIS — H10.33 ACUTE CONJUNCTIVITIS OF BOTH EYES, UNSPECIFIED ACUTE CONJUNCTIVITIS TYPE: ICD-10-CM

## 2021-08-17 DIAGNOSIS — Z00.00 ROUTINE ADULT HEALTH MAINTENANCE: ICD-10-CM

## 2021-08-17 DIAGNOSIS — J02.9 ACUTE PHARYNGITIS, UNSPECIFIED ETIOLOGY: ICD-10-CM

## 2021-08-17 DIAGNOSIS — R21 RASH: ICD-10-CM

## 2021-08-17 DIAGNOSIS — Z23 NEED FOR SHINGLES VACCINE: ICD-10-CM

## 2021-08-17 DIAGNOSIS — M54.50 ACUTE MIDLINE LOW BACK PAIN WITHOUT SCIATICA: Primary | ICD-10-CM

## 2021-08-17 DIAGNOSIS — R50.9 FEVER, UNSPECIFIED FEVER CAUSE: ICD-10-CM

## 2021-08-17 PROBLEM — R32 URINARY INCONTINENCE: Status: ACTIVE | Noted: 2019-09-13

## 2021-08-17 PROBLEM — R31.29 MICROSCOPIC HEMATURIA: Status: ACTIVE | Noted: 2019-12-19

## 2021-08-17 LAB
EXPIRATION DATE: NORMAL
INTERNAL CONTROL: NORMAL
Lab: NORMAL
S PYO AG THROAT QL: NEGATIVE
SARS-COV-2 RNA NOSE QL NAA+PROBE: NOT DETECTED

## 2021-08-17 PROCEDURE — U0004 COV-19 TEST NON-CDC HGH THRU: HCPCS | Performed by: INTERNAL MEDICINE

## 2021-08-17 PROCEDURE — 86735 MUMPS ANTIBODY: CPT | Performed by: INTERNAL MEDICINE

## 2021-08-17 PROCEDURE — 87880 STREP A ASSAY W/OPTIC: CPT | Performed by: INTERNAL MEDICINE

## 2021-08-17 PROCEDURE — 99214 OFFICE O/P EST MOD 30 MIN: CPT | Performed by: INTERNAL MEDICINE

## 2021-08-17 PROCEDURE — 86765 RUBEOLA ANTIBODY: CPT | Performed by: INTERNAL MEDICINE

## 2021-08-17 PROCEDURE — 36415 COLL VENOUS BLD VENIPUNCTURE: CPT | Performed by: INTERNAL MEDICINE

## 2021-08-17 PROCEDURE — 86762 RUBELLA ANTIBODY: CPT | Performed by: INTERNAL MEDICINE

## 2021-08-17 RX ORDER — CYCLOBENZAPRINE HCL 10 MG
10 TABLET ORAL NIGHTLY PRN
Qty: 30 TABLET | Refills: 1 | Status: SHIPPED | OUTPATIENT
Start: 2021-08-17 | End: 2021-09-21

## 2021-08-17 RX ORDER — VALACYCLOVIR HYDROCHLORIDE 1 G/1
1000 TABLET, FILM COATED ORAL 3 TIMES DAILY
Qty: 21 TABLET | Refills: 0 | Status: SHIPPED | OUTPATIENT
Start: 2021-08-17 | End: 2021-08-20

## 2021-08-17 RX ORDER — ZOSTER VACCINE RECOMBINANT, ADJUVANTED 50 MCG/0.5
0.5 KIT INTRAMUSCULAR ONCE
Qty: 1 EACH | Refills: 1 | Status: SHIPPED | OUTPATIENT
Start: 2021-08-17 | End: 2021-08-17

## 2021-08-17 RX ORDER — METHYLPREDNISOLONE 4 MG/1
TABLET ORAL
Qty: 1 EACH | Refills: 0 | Status: SHIPPED | OUTPATIENT
Start: 2021-08-17 | End: 2021-09-21

## 2021-08-17 NOTE — PATIENT INSTRUCTIONS
Continue either Ibuprofen or Aleve, and try heat.    How to Quarantine at Home  Information for Patients and Families    These instructions are for people with confirmed or suspected COVID-19 who do not need to be hospitalized and those with confirmed COVID-19 who were hospitalized and discharged to care for themselves at home.    If you were tested through the Health Department  The Health Department will monitor your wellbeing.  If it is determined that you do not need to be hospitalized and can be isolated at home, you will be monitored by staff from your local or state health department.     If you were tested through a Commercial Lab  You will need to monitor yourself and report changes in your symptoms to your doctor.  See the section below called Monitor Your Symptoms.    Follow these steps until a healthcare provider or local or state health department says you can return to your normal activities.    Stay home except to get medical care  • Restrict activities outside your home, except for getting medical care.   • Do not go to work, school, or public areas.   • Avoid using public transportation, ride-sharing, or taxis.    Separate yourself from other people and animals in your home  People  As much as possible, you should stay in a specific room and away from other people in your home. Also, you should use a separate bathroom, if available.    Animals  You should restrict contact with pets and other animals while you are sick with COVID-19, just like you would around other people. When possible, have another member of your household care for your animals while you are sick. If you are sick with COVID-19, avoid contact with your pet, including petting, snuggling, being kissed or licked, and sharing food. If you must care for your pet or be around animals while you are sick, wash your hands before and after you interact with pets and wear a facemask. See COVID-19 and Animals for more information.    Call  ahead before visiting your doctor  If you have a medical appointment, call the healthcare provider and tell them that you have or may have COVID-19. This information will help the healthcare provider’s office take steps to keep other people from getting infected or exposed.    Wear a facemask  You should wear a facemask when you are around other people (e.g., sharing a room or vehicle) or pets and before you enter a healthcare provider’s office.     If you are not able to wear a facemask (for example, because it causes trouble breathing), then people who live with you should not stay in the same room with you, or they should wear a facemask if they enter your room.    Cover your coughs and sneezes  • Cover your mouth and nose with a tissue when you cough or sneeze.   • Throw used tissues in a lined trash can.   • Immediately wash your hands with soap and water for at least 20 seconds or, if soap and water are not available, clean your hands with an alcohol-based hand  that contains at least 60% alcohol.    Clean your hands often  • Wash your hands often with soap and water for at least 20 seconds, especially after blowing your nose, coughing, or sneezing; going to the bathroom; and before eating or preparing food.     • If soap and water are not readily available, use an alcohol-based hand  with at least 60% alcohol, covering all surfaces of your hands and rubbing them together until they feel dry.    • Soap and water are the best option if hands are visibly dirty. Avoid touching your eyes, nose, and mouth with unwashed hands.    Avoid sharing personal household items  • You should not share dishes, drinking glasses, cups, eating utensils, towels, or bedding with other people or pets in your home.   • After using these items, they should be washed thoroughly with soap and water.    Clean all “high-touch” surfaces everyday  • High touch surfaces include counters, tabletops, doorknobs, bathroom  fixtures, toilets, phones, keyboards, tablets, and bedside tables.   • Also, clean any surfaces that may have blood, stool, or body fluids on them.   • Use a household cleaning spray or wipe, according to the label instructions. Labels contain instructions for safe and effective use of the cleaning product, including precautions you should take when applying the product, such as wearing gloves and making sure you have good ventilation during use of the product.    Monitor your symptoms  • Seek prompt medical attention if your illness is worsening (e.g., difficulty breathing).   • Before seeking care, call your healthcare provider and tell them that you have, or are being evaluated for, COVID-19.   • Put on a facemask before you enter the facility.     • These steps will help the healthcare provider’s office to keep other people in the office or waiting room from getting infected or exposed.   • Persons who are placed under active monitoring or facilitated self-monitoring should follow instructions provided by their local health department or occupational health professionals, as appropriate.  • If you have a medical emergency and need to call 911, notify the dispatch personnel that you have, or are being evaluated for COVID-19. If possible, put on a facemask before emergency medical services arrive.    Discontinuing home isolation  Patients with confirmed COVID-19 should remain under home isolation precautions until the risk of secondary transmission to others is thought to be low. The decision to discontinue home isolation precautions should be made on a case-by-case basis, in consultation with healthcare providers and state and local health departments.    The below content are for household members, intimate partners, and caregivers of a patient with symptomatic laboratory-confirmed COVID-19 or a patient under investigation:    Household members, intimate partners, and caregivers may have close contact with a  person with symptomatic, laboratory-confirmed COVID-19 or a person under investigation.     Close contacts should monitor their health; they should call their healthcare provider right away if they develop symptoms suggestive of COVID-19 (e.g., fever, cough, shortness of breath)     Close contacts should also follow these recommendations:  • Make sure that you understand and can help the patient follow their healthcare provider’s instructions for medication(s) and care. You should help the patient with basic needs in the home and provide support for getting groceries, prescriptions, and other personal needs.  • Monitor the patient’s symptoms. If the patient is getting sicker, call his or her healthcare provider and tell them that the patient has laboratory-confirmed COVID-19. This will help the healthcare provider’s office take steps to keep other people in the office or waiting room from getting infected. Ask the healthcare provider to call the local or [facility] health department for additional guidance. If the patient has a medical emergency and you need to call 911, notify the dispatch personnel that the patient has, or is being evaluated for COVID-19.  • Household members should stay in another room or be  from the patient as much as possible. Household members should use a separate bedroom and bathroom, if available.  • Prohibit visitors who do not have an essential need to be in the home.  • Household members should care for any pets in the home. Do not handle pets or other animals while sick.  For more information, see COVID-19 and Animals.  • Make sure that shared spaces in the home have good air flow, such as by an air conditioner or an opened window, weather permitting.  • Perform hand hygiene frequently. Wash your hands often with soap and water for at least 20 seconds or use an alcohol-based hand  that contains 60 to 95% alcohol, covering all surfaces of your hands and rubbing them  together until they feel dry. Soap and water should be used preferentially if hands are visibly dirty.  • Avoid touching your eyes, nose, and mouth with unwashed hands.  • The patient should wear a facemask when you are around other people. If the patient is not able to wear a facemask (for example, because it causes trouble breathing), you, as the caregiver, should wear a mask when you are in the same room as the patient.  • Wear a disposable facemask and gloves when you touch or have contact with the patient’s blood, stool, or body fluids, such as saliva, sputum, nasal mucus, vomit, or urine.   o Throw out disposable facemasks and gloves after using them. Do not reuse.  o When removing personal protective equipment, first remove and dispose of gloves. Then, immediately clean your hands with soap and water or alcohol-based hand . Next, remove and dispose of facemask, and immediately clean your hands again with soap and water or alcohol-based hand .  • Avoid sharing household items with the patient. You should not share dishes, drinking glasses, cups, eating utensils, towels, bedding, or other items. After the patient uses these items, you should wash them thoroughly (see below “Wash laundry thoroughly”).  • Clean all “high-touch” surfaces, such as counters, tabletops, doorknobs, bathroom fixtures, toilets, phones, keyboards, tablets, and bedside tables, every day. Also, clean any surfaces that may have blood, stool, or body fluids on them.   o Use a household cleaning spray or wipe, according to the label instructions. Labels contain instructions for safe and effective use of the cleaning product including precautions you should take when applying the product, such as wearing gloves and making sure you have good ventilation during use of the product.  • Wash laundry thoroughly.   o Immediately remove and wash clothes or bedding that have blood, stool, or body fluids on them.  o Wear disposable  gloves while handling soiled items and keep soiled items away from your body. Clean your hands (with soap and water or an alcohol-based hand ) immediately after removing your gloves.  o Read and follow directions on labels of laundry or clothing items and detergent. In general, using a normal laundry detergent according to washing machine instructions and dry thoroughly using the warmest temperatures recommended on the clothing label.  • Place all used disposable gloves, facemasks, and other contaminated items in a lined container before disposing of them with other household waste. Clean your hands (with soap and water or an alcohol-based hand ) immediately after handling these items. Soap and water should be used preferentially if hands are visibly dirty.  • Discuss any additional questions with your state or local health department or healthcare provider.    Adapted from information provided by the Centers for Disease Control and Prevention.  For more information, visit https://www.cdc.gov/coronavirus/2019-ncov/hcp/guidance-prevent-spread.html

## 2021-08-17 NOTE — PROGRESS NOTES
Subjective       Maryana Love is a 46 y.o. female.     Chief Complaint   Patient presents with   • Back Pain     low back    • Eye Drainage     itching        History obtained from the patient.      Back Pain  This is a new problem. Episode onset: 1 week ago. The problem occurs constantly. The problem has been gradually worsening since onset. The pain is present in the lumbar spine. The quality of the pain is described as shooting and stabbing. The pain does not radiate. Pain severity now: moderate to severe. The pain is worse during the day. The symptoms are aggravated by twisting (any movement hurts). Stiffness is present in the morning. Associated symptoms include a fever (101.9 last night). Pertinent negatives include no abdominal pain, bladder incontinence, bowel incontinence, chest pain, dysuria, headaches, leg pain, numbness, pelvic pain, tingling, weakness or weight loss. Risk factors include recent trauma (may have injured it during boxing or Pilates). She has tried NSAIDs and ice (also tried an otc analgesic patch and massage) for the symptoms. The treatment provided mild relief.   Conjunctivitis   The current episode started 2 days ago. The problem has been gradually improving. The problem is moderate. Relieved by: wet cloth and Benadryl. The symptoms are aggravated by light. Associated symptoms include a fever (101.9 last night), eye itching, photophobia, diarrhea (x 1 episode 2 days ago), sore throat (x 2-3 days), eye discharge (yellow and crusted shut) and eye pain. Pertinent negatives include no decreased vision, no abdominal pain, no constipation, no nausea, no vomiting, no congestion, no ear pain, no headaches, no rhinorrhea, no swollen glands, no muscle aches, no neck pain, no neck stiffness, no cough, no wheezing, no rash and no eye redness. The eye pain is mild. Both eyes are affected.There were no sick contacts. She has received no recent medical care.     The patient states she  originally had a rash above her right eye as well.  It is now resolved, but she describes it as raised red blisters.    The following portions of the patient's history were reviewed and updated as appropriate: allergies, current medications, past family history, past medical history, past social history, past surgical history and problem list.      Review of Systems   Constitutional: Positive for fever (101.9 last night). Negative for chills and weight loss.   HENT: Positive for sore throat (x 2-3 days). Negative for congestion, ear pain and rhinorrhea.    Eyes: Positive for photophobia, pain, discharge (yellow and crusted shut) and itching. Negative for redness.   Respiratory: Negative for cough, shortness of breath and wheezing.    Cardiovascular: Negative for chest pain.   Gastrointestinal: Positive for diarrhea (x 1 episode 2 days ago). Negative for abdominal pain, blood in stool, bowel incontinence, constipation, nausea and vomiting.   Genitourinary: Negative for bladder incontinence, dysuria and pelvic pain.   Musculoskeletal: Positive for back pain. Negative for arthralgias, myalgias, neck pain and neck stiffness.   Skin: Negative for rash.   Neurological: Negative for tingling, weakness, numbness and headaches.   Hematological: Negative for adenopathy.           Objective     Blood pressure 111/54, pulse 77, temperature 97.1 °F (36.2 °C), temperature source Temporal, resp. rate 20, weight 61.9 kg (136 lb 7 oz), not currently breastfeeding.    Physical Exam  Vitals and nursing note reviewed.   Constitutional:       Appearance: She is well-developed and normal weight.   HENT:      Head: Normocephalic and atraumatic.      Comments: No maxillary or frontal sinus tenderness to palpation.     Right Ear: Tympanic membrane, ear canal and external ear normal.      Left Ear: Tympanic membrane, ear canal and external ear normal.      Mouth/Throat:      Mouth: Mucous membranes are moist. No oral lesions.      Pharynx:  Oropharynx is clear.      Comments: Tonsils normal.  Eyes:      Conjunctiva/sclera: Conjunctivae normal.   Neck:      Comments: There is no tenderness to palpation of the cervical spine or paraspinal muscles.  Cardiovascular:      Rate and Rhythm: Normal rate and regular rhythm.      Heart sounds: Normal heart sounds. No murmur heard.     Pulmonary:      Effort: Pulmonary effort is normal.      Breath sounds: Normal breath sounds.   Abdominal:      General: Bowel sounds are normal. There is no distension.      Palpations: Abdomen is soft. There is no hepatomegaly, splenomegaly or mass.      Tenderness: There is no abdominal tenderness. There is no right CVA tenderness or left CVA tenderness.      Comments: No CVA tenderness.   Musculoskeletal:         General: Normal range of motion.      Cervical back: Normal range of motion and neck supple.      Comments: There is no tenderness to palpation over the lumbar or thoracic spine or thoracic paraspinal muscles.  There is tenderness to palpation over the bilateral lumbar paraspinal muscles.  Straight leg raise is negative bilaterally.  There is no pain with bilateral hip flexion, extension,  and adduction.  There is  pain with bilateral hip abduction.   Lymphadenopathy:      Cervical: No cervical adenopathy.   Skin:     Findings: No rash.   Neurological:      Mental Status: She is alert.      Motor: Motor function is intact.      Gait: Gait is intact.      Deep Tendon Reflexes: Reflexes are normal and symmetric.   Psychiatric:         Mood and Affect: Mood normal.         Results for orders placed or performed in visit on 08/17/21   POC Rapid Strep A    Specimen: Swab   Result Value Ref Range    Rapid Strep A Screen Negative Negative, VALID, INVALID, Not Performed    Internal Control Passed Passed    Lot Number [id number]     Expiration Date 4-30-22        Assessment/Plan   Diagnoses and all orders for this visit:    1. Acute midline low back pain without sciatica  (Primary)  -     cyclobenzaprine (FLEXERIL) 10 MG tablet; Take 1 tablet by mouth At Night As Needed for Muscle Spasms.  Dispense: 30 tablet; Refill: 1  -     methylPREDNISolone (MEDROL) 4 MG dose pack; Take as directed on package instructions.  Dispense: 1 each; Refill: 0   Continue either Ibuprofen or Aleve, and try heat.    2. Acute conjunctivitis of both eyes, unspecified acute conjunctivitis type   Resolved.    3. Acute pharyngitis, unspecified etiology  -     POC Rapid Strep A    4. Fever, unspecified fever cause  -     COVID-19 PCR, [facility], NP SWAB IN [facility] VIRAL TRANSPORT MEDIA/ORAL SWISH 24-30 HR TAT - Swab, Nasopharynx; Future  -     COVID-19 PCR, [facility], NP SWAB IN [facility] V[facility]AL TRANSPORT MEDIA/ORAL SWISH 24-30 HR TAT - Swab, Nasopharynx    5. Need for shingles vaccine  -     Zoster Vac Recomb Adjuvanted (Shingrix) 50 MCG/0.5ML reconstituted suspension; Inject 0.5 mL into the appropriate muscle as directed by prescriber 1 (One) Time for 1 dose.  Dispense: 1 each; Refill: 1-patient requested this to have the shot done at the pharmacy.    6. Routine adult health maintenance  -     Measles / Mumps / Rubella Immunity-patient requested.    7. Rash (inform patient shingles unlikely)  -     valACYclovir (Valtrex) 1000 MG tablet; Take 1 tablet by mouth 3 (Three) Times a Day for 7 days.  Dispense: 21 tablet; Refill: 0-empiric treatment        Return if symptoms worsen or fail to improve.

## 2021-08-18 LAB
MEV IGG SER IA-ACNC: >300 AU/ML
MUV IGG SER IA-ACNC: 132 AU/ML
RUBV IGG SERPL IA-ACNC: 7.14 INDEX

## 2021-08-18 NOTE — TELEPHONE ENCOUNTER
Pt advised of message from PCP COVID-19 test was negative. Pt advised Strep A is negative. Good verbal understanding.    - Rate controlled and currently in sinus rhythm   -c/w cardizem 30mg po q6hrs  -no AC at this time until GI work up completed

## 2021-08-20 DIAGNOSIS — R21 RASH: ICD-10-CM

## 2021-08-20 RX ORDER — VALACYCLOVIR HYDROCHLORIDE 1 G/1
TABLET, FILM COATED ORAL
Qty: 21 TABLET | Refills: 1 | Status: SHIPPED | OUTPATIENT
Start: 2021-08-20 | End: 2021-09-21

## 2021-09-15 ENCOUNTER — APPOINTMENT (OUTPATIENT)
Dept: PREADMISSION TESTING | Facility: HOSPITAL | Age: 46
End: 2021-09-15

## 2021-09-21 ENCOUNTER — PRE-ADMISSION TESTING (OUTPATIENT)
Dept: PREADMISSION TESTING | Facility: HOSPITAL | Age: 46
End: 2021-09-21

## 2021-09-21 VITALS — HEIGHT: 65 IN | WEIGHT: 136.47 LBS | BODY MASS INDEX: 22.74 KG/M2

## 2021-09-21 LAB
ANION GAP SERPL CALCULATED.3IONS-SCNC: 8 MMOL/L (ref 5–15)
BUN SERPL-MCNC: 13 MG/DL (ref 6–20)
BUN/CREAT SERPL: 14.8 (ref 7–25)
CALCIUM SPEC-SCNC: 9.6 MG/DL (ref 8.6–10.5)
CHLORIDE SERPL-SCNC: 106 MMOL/L (ref 98–107)
CO2 SERPL-SCNC: 24 MMOL/L (ref 22–29)
CREAT SERPL-MCNC: 0.88 MG/DL (ref 0.57–1)
DEPRECATED RDW RBC AUTO: 47.8 FL (ref 37–54)
ERYTHROCYTE [DISTWIDTH] IN BLOOD BY AUTOMATED COUNT: 12.7 % (ref 12.3–15.4)
GFR SERPL CREATININE-BSD FRML MDRD: 69 ML/MIN/1.73
GLUCOSE SERPL-MCNC: 90 MG/DL (ref 65–99)
HCT VFR BLD AUTO: 41.8 % (ref 34–46.6)
HGB BLD-MCNC: 13.2 G/DL (ref 12–15.9)
MCH RBC QN AUTO: 32 PG (ref 26.6–33)
MCHC RBC AUTO-ENTMCNC: 31.6 G/DL (ref 31.5–35.7)
MCV RBC AUTO: 101.2 FL (ref 79–97)
PLATELET # BLD AUTO: 326 10*3/MM3 (ref 140–450)
PMV BLD AUTO: 9.9 FL (ref 6–12)
POTASSIUM SERPL-SCNC: 4.4 MMOL/L (ref 3.5–5.2)
RBC # BLD AUTO: 4.13 10*6/MM3 (ref 3.77–5.28)
SARS-COV-2 RNA PNL SPEC NAA+PROBE: NOT DETECTED
SODIUM SERPL-SCNC: 138 MMOL/L (ref 136–145)
WBC # BLD AUTO: 5.71 10*3/MM3 (ref 3.4–10.8)

## 2021-09-21 PROCEDURE — C9803 HOPD COVID-19 SPEC COLLECT: HCPCS

## 2021-09-21 PROCEDURE — 85027 COMPLETE CBC AUTOMATED: CPT

## 2021-09-21 PROCEDURE — U0004 COV-19 TEST NON-CDC HGH THRU: HCPCS

## 2021-09-21 PROCEDURE — 36415 COLL VENOUS BLD VENIPUNCTURE: CPT

## 2021-09-21 PROCEDURE — 80048 BASIC METABOLIC PNL TOTAL CA: CPT

## 2021-09-21 RX ORDER — TOPIRAMATE 25 MG/1
25 CAPSULE, COATED PELLETS ORAL 2 TIMES DAILY
COMMUNITY

## 2021-09-21 NOTE — PAT
Patient to apply Chlorhexadine wipes  to surgical area (as instructed) the night before procedure and the AM of procedure. Wipes provided.    Patient instructed to drink 20 ounces (or until full) of Gatorade and it needs to be completed 1 hour (for Main OR patients) or 2 hours (scheduled  section patients) before given arrival time for procedure (NO RED Gatorade)    Patient verbalized understanding.    Patient viewed general PAT education video as instructed in their preoperative information received from their surgeon.  Patient stated the general PAT education video was viewed in its entirety and survey completed.  Copies of PAT general education handouts (Incentive Spirometry, Meds to Beds Program, Patient Belongings, Pre-op skin preparation instructions, Blood Glucose testing, Visitor policy, Surgery FAQ, Code H) distributed to patient if not printed. Education related to the PAT pass and skin preparation for surgery (if applicable) completed in PAT as a reinforcement to PAT education video. Patient instructed to return PAT pass provided today as well as completed skin preparation sheet (if applicable) on the day of procedure.     Additionally if patient had not viewed video yet but intended to view it at home or in our waiting area, then referred them to the handout with QR code/link provided during PAT visit.  Instructed patient to complete survey after viewing the video in its entirety.  Encouraged patient/family to read PAT general education handouts thoroughly and notify PAT staff with any questions or concerns. Patient verbalized understanding of all information and priority content.

## 2021-09-22 ENCOUNTER — ANESTHESIA EVENT (OUTPATIENT)
Dept: PERIOP | Facility: HOSPITAL | Age: 46
End: 2021-09-22

## 2021-09-22 ENCOUNTER — APPOINTMENT (OUTPATIENT)
Dept: PREADMISSION TESTING | Facility: HOSPITAL | Age: 46
End: 2021-09-22

## 2021-09-22 RX ORDER — FAMOTIDINE 10 MG/ML
20 INJECTION, SOLUTION INTRAVENOUS ONCE
Status: CANCELLED | OUTPATIENT
Start: 2021-09-22 | End: 2021-09-22

## 2021-09-23 ENCOUNTER — ANESTHESIA (OUTPATIENT)
Dept: PERIOP | Facility: HOSPITAL | Age: 46
End: 2021-09-23

## 2021-09-23 ENCOUNTER — HOSPITAL ENCOUNTER (OUTPATIENT)
Facility: HOSPITAL | Age: 46
Setting detail: SURGERY ADMIT
Discharge: HOME | End: 2021-09-23
Attending: PLASTIC SURGERY | Admitting: PLASTIC SURGERY

## 2021-09-23 VITALS
TEMPERATURE: 97.3 F | SYSTOLIC BLOOD PRESSURE: 120 MMHG | HEART RATE: 78 BPM | DIASTOLIC BLOOD PRESSURE: 77 MMHG | WEIGHT: 132 LBS | RESPIRATION RATE: 15 BRPM | OXYGEN SATURATION: 100 % | BODY MASS INDEX: 21.99 KG/M2 | HEIGHT: 65 IN

## 2021-09-23 DIAGNOSIS — C50.412 MALIGNANT NEOPLASM OF UPPER-OUTER QUADRANT OF LEFT FEMALE BREAST, UNSPECIFIED ESTROGEN RECEPTOR STATUS (HCC): Primary | ICD-10-CM

## 2021-09-23 PROCEDURE — 25010000002 PROPOFOL 10 MG/ML EMULSION: Performed by: NURSE ANESTHETIST, CERTIFIED REGISTERED

## 2021-09-23 PROCEDURE — 25010000003 CEFAZOLIN PER 500 MG: Performed by: PLASTIC SURGERY

## 2021-09-23 PROCEDURE — S0260 H&P FOR SURGERY: HCPCS | Performed by: PHYSICIAN ASSISTANT

## 2021-09-23 PROCEDURE — 25010000002 DEXAMETHASONE PER 1 MG: Performed by: NURSE ANESTHETIST, CERTIFIED REGISTERED

## 2021-09-23 PROCEDURE — 25010000002 MIDAZOLAM PER 1 MG: Performed by: NURSE ANESTHETIST, CERTIFIED REGISTERED

## 2021-09-23 PROCEDURE — 25010000002 FENTANYL CITRATE (PF) 50 MCG/ML SOLUTION: Performed by: NURSE ANESTHETIST, CERTIFIED REGISTERED

## 2021-09-23 PROCEDURE — 25010000003 CEFAZOLIN IN DEXTROSE 2-4 GM/100ML-% SOLUTION: Performed by: PLASTIC SURGERY

## 2021-09-23 PROCEDURE — 25010000002 NEOSTIGMINE 10 MG/10ML SOLUTION: Performed by: NURSE ANESTHETIST, CERTIFIED REGISTERED

## 2021-09-23 PROCEDURE — C1789 PROSTHESIS, BREAST, IMP: HCPCS | Performed by: PLASTIC SURGERY

## 2021-09-23 PROCEDURE — 25010000002 GENTAMICIN PER 80 MG: Performed by: PLASTIC SURGERY

## 2021-09-23 PROCEDURE — 25010000002 ONDANSETRON PER 1 MG: Performed by: NURSE ANESTHETIST, CERTIFIED REGISTERED

## 2021-09-23 DEVICE — NATRELLE INSPIRA SCX 525CC EXTRA FULL PROFILE  SMOOTH ROUND SILICONE
Type: IMPLANTABLE DEVICE | Site: BREAST | Status: FUNCTIONAL
Brand: NATRELLE INSPIRA COHESIVE BREAST IMPLANTS

## 2021-09-23 DEVICE — STRATTICE RECONSTRUCTIVE TISSUE MATRIX, 10 X 16, FIRM
Type: IMPLANTABLE DEVICE | Site: BREAST | Status: FUNCTIONAL
Brand: STRATTICE RECONSTRUCTIVE TISSUE MATRIX

## 2021-09-23 RX ORDER — HYDROMORPHONE HYDROCHLORIDE 1 MG/ML
0.5 INJECTION, SOLUTION INTRAMUSCULAR; INTRAVENOUS; SUBCUTANEOUS
Status: DISCONTINUED | OUTPATIENT
Start: 2021-09-23 | End: 2021-09-23 | Stop reason: HOSPADM

## 2021-09-23 RX ORDER — NEOSTIGMINE METHYLSULFATE 1 MG/ML
INJECTION, SOLUTION INTRAVENOUS AS NEEDED
Status: DISCONTINUED | OUTPATIENT
Start: 2021-09-23 | End: 2021-09-23 | Stop reason: SURG

## 2021-09-23 RX ORDER — ONDANSETRON 4 MG/1
4 TABLET, ORALLY DISINTEGRATING ORAL EVERY 8 HOURS PRN
Qty: 20 TABLET | Refills: 0 | Status: ON HOLD | OUTPATIENT
Start: 2021-09-23 | End: 2021-10-19

## 2021-09-23 RX ORDER — CEFAZOLIN SODIUM 2 G/100ML
2 INJECTION, SOLUTION INTRAVENOUS ONCE
Status: COMPLETED | OUTPATIENT
Start: 2021-09-23 | End: 2021-09-23

## 2021-09-23 RX ORDER — SODIUM CHLORIDE 0.9 % (FLUSH) 0.9 %
3 SYRINGE (ML) INJECTION EVERY 12 HOURS SCHEDULED
Status: DISCONTINUED | OUTPATIENT
Start: 2021-09-23 | End: 2021-09-23 | Stop reason: HOSPADM

## 2021-09-23 RX ORDER — GLYCOPYRROLATE 0.2 MG/ML
INJECTION INTRAMUSCULAR; INTRAVENOUS AS NEEDED
Status: DISCONTINUED | OUTPATIENT
Start: 2021-09-23 | End: 2021-09-23 | Stop reason: SURG

## 2021-09-23 RX ORDER — MIDAZOLAM HYDROCHLORIDE 1 MG/ML
1 INJECTION INTRAMUSCULAR; INTRAVENOUS
Status: DISCONTINUED | OUTPATIENT
Start: 2021-09-23 | End: 2021-09-23 | Stop reason: HOSPADM

## 2021-09-23 RX ORDER — PROPOFOL 10 MG/ML
VIAL (ML) INTRAVENOUS AS NEEDED
Status: DISCONTINUED | OUTPATIENT
Start: 2021-09-23 | End: 2021-09-23 | Stop reason: SURG

## 2021-09-23 RX ORDER — DROPERIDOL 2.5 MG/ML
0.62 INJECTION, SOLUTION INTRAMUSCULAR; INTRAVENOUS AS NEEDED
Status: DISCONTINUED | OUTPATIENT
Start: 2021-09-23 | End: 2021-09-23 | Stop reason: HOSPADM

## 2021-09-23 RX ORDER — NALOXONE HCL 0.4 MG/ML
0.4 VIAL (ML) INJECTION AS NEEDED
Status: DISCONTINUED | OUTPATIENT
Start: 2021-09-23 | End: 2021-09-23 | Stop reason: HOSPADM

## 2021-09-23 RX ORDER — LIDOCAINE HYDROCHLORIDE 10 MG/ML
INJECTION, SOLUTION EPIDURAL; INFILTRATION; INTRACAUDAL; PERINEURAL AS NEEDED
Status: DISCONTINUED | OUTPATIENT
Start: 2021-09-23 | End: 2021-09-23 | Stop reason: SURG

## 2021-09-23 RX ORDER — SODIUM CHLORIDE, SODIUM LACTATE, POTASSIUM CHLORIDE, CALCIUM CHLORIDE 600; 310; 30; 20 MG/100ML; MG/100ML; MG/100ML; MG/100ML
9 INJECTION, SOLUTION INTRAVENOUS CONTINUOUS
Status: DISCONTINUED | OUTPATIENT
Start: 2021-09-23 | End: 2021-09-23 | Stop reason: HOSPADM

## 2021-09-23 RX ORDER — PROMETHAZINE HYDROCHLORIDE 25 MG/1
25 TABLET ORAL ONCE AS NEEDED
Status: DISCONTINUED | OUTPATIENT
Start: 2021-09-23 | End: 2021-09-23 | Stop reason: HOSPADM

## 2021-09-23 RX ORDER — DROPERIDOL 2.5 MG/ML
0.62 INJECTION, SOLUTION INTRAMUSCULAR; INTRAVENOUS ONCE AS NEEDED
Status: DISCONTINUED | OUTPATIENT
Start: 2021-09-23 | End: 2021-09-23 | Stop reason: HOSPADM

## 2021-09-23 RX ORDER — MAGNESIUM HYDROXIDE 1200 MG/15ML
LIQUID ORAL AS NEEDED
Status: DISCONTINUED | OUTPATIENT
Start: 2021-09-23 | End: 2021-09-23 | Stop reason: HOSPADM

## 2021-09-23 RX ORDER — HYDRALAZINE HYDROCHLORIDE 20 MG/ML
5 INJECTION INTRAMUSCULAR; INTRAVENOUS
Status: DISCONTINUED | OUTPATIENT
Start: 2021-09-23 | End: 2021-09-23 | Stop reason: HOSPADM

## 2021-09-23 RX ORDER — SODIUM CHLORIDE 0.9 % (FLUSH) 0.9 %
10 SYRINGE (ML) INJECTION EVERY 12 HOURS SCHEDULED
Status: DISCONTINUED | OUTPATIENT
Start: 2021-09-23 | End: 2021-09-23 | Stop reason: HOSPADM

## 2021-09-23 RX ORDER — LABETALOL HYDROCHLORIDE 5 MG/ML
5 INJECTION, SOLUTION INTRAVENOUS
Status: DISCONTINUED | OUTPATIENT
Start: 2021-09-23 | End: 2021-09-23 | Stop reason: HOSPADM

## 2021-09-23 RX ORDER — EPHEDRINE SULFATE 50 MG/ML
INJECTION, SOLUTION INTRAVENOUS AS NEEDED
Status: DISCONTINUED | OUTPATIENT
Start: 2021-09-23 | End: 2021-09-23 | Stop reason: SURG

## 2021-09-23 RX ORDER — DEXAMETHASONE SODIUM PHOSPHATE 4 MG/ML
INJECTION, SOLUTION INTRA-ARTICULAR; INTRALESIONAL; INTRAMUSCULAR; INTRAVENOUS; SOFT TISSUE AS NEEDED
Status: DISCONTINUED | OUTPATIENT
Start: 2021-09-23 | End: 2021-09-23 | Stop reason: SURG

## 2021-09-23 RX ORDER — SODIUM CHLORIDE 0.9 % (FLUSH) 0.9 %
10 SYRINGE (ML) INJECTION AS NEEDED
Status: DISCONTINUED | OUTPATIENT
Start: 2021-09-23 | End: 2021-09-23 | Stop reason: HOSPADM

## 2021-09-23 RX ORDER — FAMOTIDINE 20 MG/1
20 TABLET, FILM COATED ORAL ONCE
Status: COMPLETED | OUTPATIENT
Start: 2021-09-23 | End: 2021-09-23

## 2021-09-23 RX ORDER — ONDANSETRON 2 MG/ML
INJECTION INTRAMUSCULAR; INTRAVENOUS AS NEEDED
Status: DISCONTINUED | OUTPATIENT
Start: 2021-09-23 | End: 2021-09-23 | Stop reason: SURG

## 2021-09-23 RX ORDER — SODIUM CHLORIDE 0.9 % (FLUSH) 0.9 %
3-10 SYRINGE (ML) INJECTION AS NEEDED
Status: DISCONTINUED | OUTPATIENT
Start: 2021-09-23 | End: 2021-09-23 | Stop reason: HOSPADM

## 2021-09-23 RX ORDER — MIDAZOLAM HYDROCHLORIDE 1 MG/ML
INJECTION INTRAMUSCULAR; INTRAVENOUS AS NEEDED
Status: DISCONTINUED | OUTPATIENT
Start: 2021-09-23 | End: 2021-09-23 | Stop reason: SURG

## 2021-09-23 RX ORDER — MEPERIDINE HYDROCHLORIDE 25 MG/ML
12.5 INJECTION INTRAMUSCULAR; INTRAVENOUS; SUBCUTANEOUS
Status: DISCONTINUED | OUTPATIENT
Start: 2021-09-23 | End: 2021-09-23 | Stop reason: HOSPADM

## 2021-09-23 RX ORDER — LIDOCAINE HYDROCHLORIDE AND EPINEPHRINE 10; 10 MG/ML; UG/ML
INJECTION, SOLUTION INFILTRATION; PERINEURAL AS NEEDED
Status: DISCONTINUED | OUTPATIENT
Start: 2021-09-23 | End: 2021-09-23 | Stop reason: HOSPADM

## 2021-09-23 RX ORDER — FENTANYL CITRATE 50 UG/ML
INJECTION, SOLUTION INTRAMUSCULAR; INTRAVENOUS AS NEEDED
Status: DISCONTINUED | OUTPATIENT
Start: 2021-09-23 | End: 2021-09-23 | Stop reason: SURG

## 2021-09-23 RX ORDER — PROMETHAZINE HYDROCHLORIDE 25 MG/1
25 SUPPOSITORY RECTAL ONCE AS NEEDED
Status: DISCONTINUED | OUTPATIENT
Start: 2021-09-23 | End: 2021-09-23 | Stop reason: HOSPADM

## 2021-09-23 RX ORDER — IPRATROPIUM BROMIDE AND ALBUTEROL SULFATE 2.5; .5 MG/3ML; MG/3ML
3 SOLUTION RESPIRATORY (INHALATION) ONCE AS NEEDED
Status: DISCONTINUED | OUTPATIENT
Start: 2021-09-23 | End: 2021-09-23 | Stop reason: HOSPADM

## 2021-09-23 RX ORDER — LIDOCAINE HYDROCHLORIDE 10 MG/ML
0.5 INJECTION, SOLUTION EPIDURAL; INFILTRATION; INTRACAUDAL; PERINEURAL ONCE AS NEEDED
Status: DISCONTINUED | OUTPATIENT
Start: 2021-09-23 | End: 2021-09-23 | Stop reason: HOSPADM

## 2021-09-23 RX ORDER — ROCURONIUM BROMIDE 10 MG/ML
INJECTION, SOLUTION INTRAVENOUS AS NEEDED
Status: DISCONTINUED | OUTPATIENT
Start: 2021-09-23 | End: 2021-09-23 | Stop reason: SURG

## 2021-09-23 RX ORDER — ONDANSETRON 2 MG/ML
4 INJECTION INTRAMUSCULAR; INTRAVENOUS ONCE AS NEEDED
Status: DISCONTINUED | OUTPATIENT
Start: 2021-09-23 | End: 2021-09-23 | Stop reason: HOSPADM

## 2021-09-23 RX ORDER — OXYCODONE HYDROCHLORIDE 5 MG/1
5 TABLET ORAL EVERY 4 HOURS PRN
Qty: 20 TABLET | Refills: 0 | Status: ON HOLD | OUTPATIENT
Start: 2021-09-23 | End: 2021-10-19

## 2021-09-23 RX ORDER — PROMETHAZINE HYDROCHLORIDE 25 MG/1
25 TABLET ORAL ONCE AS NEEDED
Status: DISCONTINUED | OUTPATIENT
Start: 2021-09-23 | End: 2021-09-23 | Stop reason: SDUPTHER

## 2021-09-23 RX ORDER — FENTANYL CITRATE 50 UG/ML
50 INJECTION, SOLUTION INTRAMUSCULAR; INTRAVENOUS
Status: DISCONTINUED | OUTPATIENT
Start: 2021-09-23 | End: 2021-09-23 | Stop reason: HOSPADM

## 2021-09-23 RX ORDER — SODIUM CHLORIDE 9 MG/ML
INJECTION, SOLUTION INTRAVENOUS AS NEEDED
Status: DISCONTINUED | OUTPATIENT
Start: 2021-09-23 | End: 2021-09-23 | Stop reason: HOSPADM

## 2021-09-23 RX ADMIN — ROCURONIUM BROMIDE 40 MG: 10 INJECTION, SOLUTION INTRAVENOUS at 07:41

## 2021-09-23 RX ADMIN — ONDANSETRON 4 MG: 2 INJECTION INTRAMUSCULAR; INTRAVENOUS at 11:05

## 2021-09-23 RX ADMIN — CEFAZOLIN SODIUM 2 G: 2 INJECTION, SOLUTION INTRAVENOUS at 07:38

## 2021-09-23 RX ADMIN — SODIUM CHLORIDE, POTASSIUM CHLORIDE, SODIUM LACTATE AND CALCIUM CHLORIDE 9 ML/HR: 600; 310; 30; 20 INJECTION, SOLUTION INTRAVENOUS at 06:45

## 2021-09-23 RX ADMIN — FENTANYL CITRATE 50 MCG: 50 INJECTION, SOLUTION INTRAMUSCULAR; INTRAVENOUS at 08:56

## 2021-09-23 RX ADMIN — EPHEDRINE SULFATE 10 MG: 50 INJECTION INTRAVENOUS at 08:20

## 2021-09-23 RX ADMIN — ROCURONIUM BROMIDE 10 MG: 10 INJECTION, SOLUTION INTRAVENOUS at 08:56

## 2021-09-23 RX ADMIN — FENTANYL CITRATE 100 MCG: 50 INJECTION, SOLUTION INTRAMUSCULAR; INTRAVENOUS at 07:41

## 2021-09-23 RX ADMIN — MIDAZOLAM HYDROCHLORIDE 2 MG: 1 INJECTION, SOLUTION INTRAMUSCULAR; INTRAVENOUS at 07:30

## 2021-09-23 RX ADMIN — SODIUM CHLORIDE, POTASSIUM CHLORIDE, SODIUM LACTATE AND CALCIUM CHLORIDE: 600; 310; 30; 20 INJECTION, SOLUTION INTRAVENOUS at 09:30

## 2021-09-23 RX ADMIN — FENTANYL CITRATE 50 MCG: 50 INJECTION, SOLUTION INTRAMUSCULAR; INTRAVENOUS at 09:47

## 2021-09-23 RX ADMIN — NEOSTIGMINE METHYLSULFATE 3 MG: 0.5 INJECTION INTRAVENOUS at 11:01

## 2021-09-23 RX ADMIN — FAMOTIDINE 20 MG: 20 TABLET, FILM COATED ORAL at 06:54

## 2021-09-23 RX ADMIN — DEXAMETHASONE SODIUM PHOSPHATE 8 MG: 4 INJECTION, SOLUTION INTRA-ARTICULAR; INTRALESIONAL; INTRAMUSCULAR; INTRAVENOUS; SOFT TISSUE at 07:45

## 2021-09-23 RX ADMIN — GLYCOPYRROLATE 0.4 MG: 0.2 INJECTION INTRAMUSCULAR; INTRAVENOUS at 11:01

## 2021-09-23 RX ADMIN — LIDOCAINE HYDROCHLORIDE 50 MG: 10 INJECTION, SOLUTION EPIDURAL; INFILTRATION; INTRACAUDAL; PERINEURAL at 07:41

## 2021-09-23 RX ADMIN — PROPOFOL 200 MG: 10 INJECTION, EMULSION INTRAVENOUS at 07:41

## 2021-09-23 RX ADMIN — EPHEDRINE SULFATE 10 MG: 50 INJECTION INTRAVENOUS at 08:34

## 2021-09-23 NOTE — ANESTHESIA PROCEDURE NOTES
Airway  Urgency: elective    Date/Time: 9/23/2021 7:43 AM  Airway not difficult    General Information and Staff    Patient location during procedure: OR  CRNA: Cindy Matthews CRNA    Indications and Patient Condition  Indications for airway management: airway protection    Preoxygenated: yes  MILS not maintained throughout  Mask difficulty assessment: 1 - vent by mask    Final Airway Details  Final airway type: endotracheal airway      Successful airway: ETT  Cuffed: yes   Successful intubation technique: direct laryngoscopy  Endotracheal tube insertion site: oral  Blade: Geovanna  Blade size: 3  ETT size (mm): 7.0  Cormack-Lehane Classification: grade I - full view of glottis  Placement verified by: chest auscultation and capnometry   Measured from: teeth  ETT/EBT  to teeth (cm): 21  Number of attempts at approach: 1  Assessment: lips, teeth, and gum same as pre-op and atraumatic intubation    Additional Comments  Bilateral equal symmetric chest rise, bilateral breath sounds verified, epigastric sounds negative

## 2021-09-23 NOTE — ANESTHESIA POSTPROCEDURE EVALUATION
Patient: Maryana Love    Procedure Summary     Date: 09/23/21 Room / Location:  [facility]    Anesthesia Start: 0738 Anesthesia Stop:     Procedure: BREAST TISSUE EXPANDERS TO PERMANENT IMPLANT INSERTION BILATERAL (Bilateral Breast) Diagnosis:       Acquired absence of breast and absent nipple, bilateral      Breast asymmetry      Personal history of breast cancer    Surgeons: José Miguel Chatman MD Provider: Jesus Burroughs MD    Anesthesia Type: general ASA Status: 2          Anesthesia Type: general    Vitals  HR 78  /57  RR 14  Temp 98 F  O2 sat 99%        Post Anesthesia Care and Evaluation    Patient location during evaluation: PACU  Patient participation: complete - patient participated  Level of consciousness: awake and alert  Pain management: adequate  Airway patency: patent  Anesthetic complications: No anesthetic complications  PONV Status: none  Cardiovascular status: hemodynamically stable and acceptable  Respiratory status: nonlabored ventilation, acceptable and nasal cannula  Hydration status: acceptable

## 2021-09-23 NOTE — OP NOTE
Preoperative diagnosis:  1. Genetic predisposition to breast cancer  2.  Absent bilateral breast and nipple areolar complex  3.  Breast asymmetry     Postoperative diagnosis:  1.  Genetic predisposition to breast cancer  2.  Absent bilateral breast and nipple areolar complex  3.  Breast asymmetry       Surgeon: José Miguel Chatman MD    Assistants: Angelina BARRAZA was responsible for performing the following activities: Retraction, Suction, Irrigation, Suturing, Closing and Placing Dressing and their skilled assistance was necessary for the success of this case.    Anesthesia: General    Procedure:  1.  Bilateral tissue expander exchange for a permanent silicone gel implant.  The silicone gel implant is a Allergan Natrelle Inspira Cohesive Style SCX volume 525 cc serial number on the right [id number] and serial number on the left [id number].  2.  Bilateral revision of her reconstructed breast with significant capsular work including right medial capsulotomy, bilateral lateral capsulorrhaphy's, and placement of Stratus in her bilateral lateral gutters and her right medial gutter.  The Stratus is 10 x 16 cm².  The lot number is [id number].  The reference number is [id number].    Indication: The patient is a 46 year old white female who had breast cancer.  The patient underwent bilateral mastectomy followed by bilateral immediate partial submuscular implant-based breast reconstruction with placement of tissue expanders.  Unfortunately, the patient had discomfort with her ultimate implants and she has since been converted to a bilateral prepectoralis tissue plane with tissue expanders and AlloDerm.  The patient is now ready for tissue expander exchange.  She is noted to have a widened breast pocket bilaterally and greatly lateralized tissue expanders.  I recommended pop Strat[place] and capsulorrhaphy with placement of mesh to correct this issue at the time of tissue expander exchange.  All techniques, potential complications and  typical postoperative course were discussed with the patient.  The patient indicated understanding and elected to proceed.    Findings:  1.  Absent breast and nipple areola complex  2.  Lower left reconstructed breast footprint    Description: The patient was taken from preoperative holding to the operating room after informed consent was signed the chart and placed under general anesthesia successfully.  Prior to induction of anesthesia the patient received a prophylactic dose of antibiotics and had bilateral lower extremity sequential compression devices in place and operational.  The patient was supine on the operating table.  The patient had a pillow placed beneath knees.  The patient's arms were gently abducted to 90° and the patient had ulnar nerve padding.  The chest wall was prepped and draped in the usual sterile fashion.  After properly identifying the patient and the patient's problem, the bilateral mastectomy incisions were injected with 1% lidocaine with 1-100,000 epinephrine after being marked with a marking pen.  A 10 blade was used to incise the skin on the right side first.  The subcutaneous tissues dissected with electrocautery.  The tissue expander was encountered.  The tissue expander was removed.  The AlloDerm was noted to be adherent.  The pocket was noted to be widened after initial placement of a 495 cc sizer via a Norman funnel.  The pocket was irrigated with antibiotic solution.  The lateral gutter underwent capsulorrhaphy initially with Vicryl suture.  The sizer was replaced and the capsulorrhaphy was noted to be adequate.  However, the implant did not medialize appropriately.  The temporary closure and sizer were then taken down.  A medial capsulotomy was then performed.  A 525 cc sizer was then placed in the breast pocket via a Norman funnel.  The AlloDerm and skin were then temporary closed with Vicryl suture and staples respectively.  This appeared to medialize the breast footprint  appropriately.  This was then taken down.  The sizer was removed.  Stratus was brought in the operative field.  It was rinsed in saline for over 2 minutes.  It was cut into 2 halves.  The half piece that was used on the right was then cut into 2 pieces.  Each piece was then used to reinforce the lateral and medial gutter by insetting with 3-0 Vicryl suture in several key areas as well as 2-0 silk suture in a simple running fashion.  The 525 cc sizer was then replaced into the breast pocket via a Norman funnel.  The AlloDerm and skin were then temporary closed with Vicryl suture and staples respectively.  My attention then turned to the left side.  The mastectomy incision was marked with a marking pen.  A 10 blade was used to incise the skin on the right side first.  The subcutaneous tissues dissected with electrocautery.  The tissue expander was encountered.  The fluid within the tissue expander was removed via the port.  The tissue expander was removed.  The AlloDerm was noted to be adherent.  The pocket was noted to be widened after initial placement of a 525 cc sizer via a Norman funnel.  The pocket was irrigated with antibiotic solution.  The lateral gutter of the capsule underwent a capsulorrhaphy initially with Vicryl suture.  The sizer was replaced and the narrowing was noted to be appropriate after placement of a temporary Vicryl suture in the AlloDerm and placement of staples in the skin.  The temporary closure and sizer were then taken down and removed respectively.  The pocket was irrigated with an antibiotic and Betadine solution.  The other half of the Stratus was brought into the operative field.  It was then inset along the lateral gutter with 3-0 Vicryl suture in several key locations and a running 2-0 silk suture.  The sizer was then replaced to be a Norman funnel.  The AlloDerm and skin were then temporary closed with Vicryl suture and staples respectively.  The patient was set upright to check for  symmetry.  Symmetry was confirmed.  The patient was sat back down supine on the operating table.  The temporary closure and tissue expander were taken down and removed respectively on the left side.  The pocket was irrigated with antibiotic and Betadine solution.  Hemostasis was achieved with electrocautery.  The capsule was tagged with 3-0 Vicryl pop offs sutures.  The skin instruments were painted with Betadine.  My gloves were changed.  The planned implant was then inserted into the breast pocket via the Norman funnel.  The Vicryl ties were then tied down.  The skin was then temporary closed with staples.  My attention then turned to the right side. The temporary closure and tissue expander were taken down and removed respectively on the left side.  The pocket was irrigated with antibiotic and Betadine solution.  Hemostasis was achieved with electrocautery.  The capsule was tagged with 3-0 Vicryl pop offs sutures.  The skin instruments were painted with Betadine.  My gloves were changed.  The planned implant was then inserted into the breast pocket via the Norman funnel.  The Vicryl ties were then tied down.  The skin was then temporary closed with staples.  Simultaneously, the skin was closed with 3-0 Monocryl suture in a deep dermal buried interrupted fashion and 3-0 Stratafix suture in an intracuticular fashion.  Tissue glue was applied.  A surgical bra and Kerlix fluffs were applied.  The patient was turned over to anesthesia at which point the patient was awoken from anesthesia successfully and taken to PACU in stable condition.  I was present for the entire procedure.  All counts were correct.    Estimated blood loss: Minimal    Drains: None    Complications: None immediate

## 2021-09-23 NOTE — ANESTHESIA PREPROCEDURE EVALUATION
Anesthesia Evaluation     Patient summary reviewed and Nursing notes reviewed                Airway   Mallampati: II  TM distance: >3 FB  Neck ROM: full  No difficulty expected  Dental - normal exam     Pulmonary - normal exam   (+) a smoker,   Cardiovascular - negative cardio ROS and normal exam        Neuro/Psych- negative ROS  GI/Hepatic/Renal/Endo - negative ROS     ROS Comment: S/p gastric sleeve 2015    Musculoskeletal (-) negative ROS    Abdominal  - normal exam    Bowel sounds: normal.   Substance History   (+) alcohol use (vivitrol (last dose 28 days ago)/campral tx),      OB/GYN negative ob/gyn ROS         Other      history of cancer (breast)                  Anesthesia Plan    ASA 2     general     intravenous induction     Anesthetic plan, all risks, benefits, and alternatives have been provided, discussed and informed consent has been obtained with: patient.    Plan discussed with CRNA.

## 2021-09-23 NOTE — BRIEF OP NOTE
BREAST RECONSTRUCTION, BREAST TISSUE EXPANDER REMOVAL, IMPLANT INSERTION  Progress Note    Maryana Love  9/23/2021    Pre-op Diagnosis:   * Acquired absence of breast and absent nipple, bilateral [Z90.13]     * Breast asymmetry [N64.89]     * Personal history of breast cancer [Z85.3]       Post-Op Diagnosis Codes:     * Acquired absence of breast and absent nipple, bilateral [Z90.13]     * Breast asymmetry [N64.89]     * Personal history of breast cancer [Z85.3]    Procedure/CPT® Codes:  TN REPLACEMENT TISSUE EXPANDER W/PERMANENT IMPLANT [34960]  TN REVISION JOEL-IMPLANT CAPSULE BREAST [51257]  TN IMPLNT BIO IMPLNT FOR SOFT TISSUE REINFORCEMENT [82970]      Procedure(s):  BREAST TISSUE EXPANDERS TO PERMANENT IMPLANT INSERTION BILATERAL    Surgeon(s):  José Miguel Chatman MD    Anesthesia: General    Staff:   Circulator: China Castle RN; Oz Jackson RN  Scrub Person: Charu Romero  Assistant: Angelina Kwan PA  Assistant: Angelina Kwan PA      Estimated Blood Loss: minimal    Urine Voided: * No values recorded between 9/23/2021  7:35 AM and 9/23/2021 11:18 AM *    Specimens:                None          Drains: * No LDAs found *    Findings: absent breast    Complications: none immediate    Assistant: Angelina Kwan PA  was responsible for performing the following activities: Retraction, Suction, Irrigation, Suturing, Closing and Placing Dressing and their skilled assistance was necessary for the success of this case.    José Miguel Chatman MD     Date: 9/23/2021  Time: 11:18 EDT

## 2021-09-23 NOTE — H&P
"Pre-Op H&P  Maryana Love  [id number]  1975    Chief complaint: Time for permanent implants\"    HPI:    Patient is a 46 y.o.female who presents with past history of bilateral mastectomy for carcinoma.  This had a complication of the right breast becoming infected was explanted and reimplanted several months ago.  She has been doing fairly well and is healed nicely now.  She is admitted for permanent implantation.    Review of Systems:  General ROS: negative for chills, fever or skin lesions;  No changes since last office visit.  Neg for recent sick exposure  Cardiovascular ROS: no chest pain or dyspnea on exertion  Respiratory ROS: no cough, shortness of breath, or wheezing    Allergies:   Allergies   Allergen Reactions   • Nitrofurantoin Hives   • Aripiprazole Other (See Comments)     Tremors     • Oxycodone Hives       Home Meds:    No current facility-administered medications on file prior to encounter.     Current Outpatient Medications on File Prior to Encounter   Medication Sig Dispense Refill   • acamprosate (CAMPRAL) 333 MG EC tablet Take 666 mg by mouth 2 (two) times a day.     • buPROPion XL (WELLBUTRIN XL) 150 MG 24 hr tablet Take 450 mg by mouth Daily.     • Cholecalciferol (VITAMIN D3 PO) Take 1 dose by mouth Every Morning.     • Naltrexone (VIVITROL IM) Inject 1 dose into the appropriate muscle as directed by prescriber Every 30 (Thirty) Days.     • lamoTRIgine (LaMICtal) 150 MG tablet Take 300 mg by mouth. Take 2 tablets daily         PMH:   Past Medical History:   Diagnosis Date   • Allergic rhinitis    • Anxiety disorder     Description: Long-term.   • Bipolar affective disorder (CMS/HCC)     Dx 2014- Anneliese.   • Chronic urinary tract infection     Description: Diagnosed 2008.  Cystoscopy normal 1/10/13, other than mild urethral inflammation. Normal diagnostic cystourethroscopy with urodynamics at  4/15.   • Colon polyps     Dx 4/19- fragments of tubular adenoma (ascending colon)- " Darlene   • Cyst of ovary     Description: Left (2.5 cm) dx by CT Scan 9/6/13.   • Depression     Description: Long-term.   • H/O abnormal mammogram     08/18/15-cat 5   • Hearing loss    • Hemangioma of liver     Description: Diagnosed by CT scan 9/13   • History of alcohol abuse 05/2021    recent time in rehab -discharged 5/18/21; currently on medications (Vivitrol and Campral)   • History of Papanicolaou smear of cervix 2008    normal per patient   • History of UTI     feb 2021-last UTI, required IV antibiotics and Infectious disease MD visit    • History of varicella    • Malignant neoplasm of upper-outer quadrant of left female breast (CMS/HCC)     Description: dx 8/15- infiltrating and in situ low-grade ductal adenocarcinoma (Stage 2A, receptor +. HER2 neg)      • Open breast wound     right breast from surgery in dec 2020   • Tremor      PSH:    Past Surgical History:   Procedure Laterality Date   • ABDOMINOPLASTY     • AUGMENTATION MAMMAPLASTY     • BELPHAROPTOSIS REPAIR Bilateral approx 11/2017   • BLADDER SURGERY      6/2/15- part of mesh removed   • BREAST BIOPSY Left 08/2005    cancer   • BREAST IMPLANT SURGERY Right 6/23/2016    Procedure: RIGHT BREAST RECONSTRUCTION, REVISION;  Surgeon: José Miguel Chatman MD;  Location:  [facility];  Service:    • BREAST IMPLANT SURGERY Right 6/4/2021    Procedure: BREAST COMPLEX CLOSURE, TISSUE EXPANDER EXCHANGE RIGHT;  Surgeon: José Miguel Chatman MD;  Location:  [facility];  Service: Plastics;  Laterality: Right;   • BREAST RECONSTRUCTION, BREAST TISSUE EXPANDER INSERTION Bilateral 12/15/2020    Procedure: CONVERSION OF RECONSTRUCTED BREASTS TISSUE EXPANDER PLACEMENT BILATERAL;  Surgeon: José Miguel Chatman MD;  Location:  [facility];  Service: Plastics;  Laterality: Bilateral;   • BREAST SURGERY     • COLONOSCOPY     • COSMETIC SURGERY  09/2020   • EYE SURGERY Right approx 10/2017    Stye removal   • FAT GRAFTING Bilateral 9/29/2016    Procedure: BILATERAL BREAST FAT  "GRAFTING;  Surgeon: José Miguel Chatman MD;  Location:  [facility];  Service:    • GASTRIC SLEEVE LAPAROSCOPIC      12/29/15   • LASIK Left approx 2018   • MASTECTOMY COMPLETE / SIMPLE W/ SENTINEL NODE BIOPSY Bilateral 10/05/2015    with reconstruction (sentinel node bx neg)   • NIPPLE RECONSTRUCTION Bilateral 2016    Procedure: BILATERAL NIPPLE/AEROLA RECONSTRUCTION WITH FLAP AND GRAFT;  Surgeon: José Miguel Chatman MD;  Location:  [facility];  Service:    • SINUS SURGERY  2012     endoscopic sinusotomies and polypectomies   • TOTAL ABDOMINAL HYSTERECTOMY  2008   • TUBAL ABDOMINAL LIGATION Bilateral 2008   • WISDOM TOOTH EXTRACTION       Patient denies allergy to contrast dye or latex  Immunization History:  Influenza: Yes  Pneumococcal: No  Tetanus: Yes    Social History:   Tobacco:   Social History     Tobacco Use   Smoking Status Light Tobacco Smoker   • Packs/day: 0.25   • Years: 20.00   • Pack years: 5.00   • Types: Cigarettes, Electronic Cigarette   • Start date:    • Last attempt to quit: 2021   • Years since quittin.3   Smokeless Tobacco Never Used   Tobacco Comment    Quit intermittently for several years at a time; recently in alcohol rehab and restarted smoking but quit 21 and started  Vaping daily since 21      Alcohol:     Social History     Substance and Sexual Activity   Alcohol Use Not Currently    Comment: recent alcohol rehab admission; discharged 21       Vitals:           /78 (BP Location: Right arm, Patient Position: Lying)   Pulse 66   Temp 97.4 °F (36.3 °C) (Temporal)   Resp 18   Ht 165.1 cm (65\")   Wt 59.9 kg (132 lb)   LMP  (LMP Unknown) Comment: LAST MAMOGRAM 2015  BMI 21.97 kg/m²     Physical Exam:  General Appearance:    Alert, cooperative, no distress, appears stated age   Head:    Normocephalic, without obvious abnormality, atraumatic   Lungs:    Clear to auscultation bilaterally to the bases    Heart:  S1-S2 without rubs murmurs or " gallops    Abdomen:   Soft, nontender, bowel sounds present throughout.   Breast Exam:    deferred   Genitalia:    deferred   Extremities:   Extremities normal, atraumatic, no cyanosis or edema   Skin:   Skin color, texture, turgor normal, no rashes or lesions   Neurologic:   Grossly intact   Results Review  LABS:  Lab Results   Component Value Date    WBC 5.71 09/21/2021    HGB 13.2 09/21/2021    HCT 41.8 09/21/2021    .2 (H) 09/21/2021     09/21/2021    NEUTROABS 5.25 07/23/2021    GLUCOSE 90 09/21/2021    BUN 13 09/21/2021    CREATININE 0.88 09/21/2021    EGFRIFNONA 69 09/21/2021     09/21/2021    K 4.4 09/21/2021     09/21/2021    CO2 24.0 09/21/2021    CALCIUM 9.6 09/21/2021    ALBUMIN 4.10 07/23/2021    AST 21 07/23/2021    ALT 13 07/23/2021    BILITOT 0.4 07/23/2021       RADIOLOGY:  No radiology results for the last 3 days     I reviewed the patient's new clinical results.    Cancer Staging (if applicable)  Cancer Patient: __ yes __no __unknown; If yes, clinical stage T:__ N:__M:__, stage group or __N/A    Impression: Breast reconstruction  Past history of breast carcinoma  Bipolar disorder  Anxiety      Plan: Breast tissue expanders to permanent implant insertion bilateral    CHRISTI Nunez   9/23/2021   06:51 EDT

## 2021-10-05 ENCOUNTER — ANESTHESIA (OUTPATIENT)
Dept: PERIOP | Facility: HOSPITAL | Age: 46
End: 2021-10-05

## 2021-10-05 ENCOUNTER — PRE-ADMISSION TESTING (OUTPATIENT)
Dept: PREADMISSION TESTING | Facility: HOSPITAL | Age: 46
End: 2021-10-05

## 2021-10-05 ENCOUNTER — HOSPITAL ENCOUNTER (OUTPATIENT)
Facility: HOSPITAL | Age: 46
Setting detail: HOSPITAL OUTPATIENT SURGERY
Discharge: HOME | End: 2021-10-05
Attending: PLASTIC SURGERY | Admitting: PLASTIC SURGERY

## 2021-10-05 ENCOUNTER — ANESTHESIA EVENT (OUTPATIENT)
Dept: PERIOP | Facility: HOSPITAL | Age: 46
End: 2021-10-05

## 2021-10-05 VITALS
RESPIRATION RATE: 16 BRPM | DIASTOLIC BLOOD PRESSURE: 66 MMHG | TEMPERATURE: 97.2 F | OXYGEN SATURATION: 99 % | SYSTOLIC BLOOD PRESSURE: 120 MMHG | HEART RATE: 75 BPM

## 2021-10-05 DIAGNOSIS — N61.0 BREAST INFECTION IN FEMALE: ICD-10-CM

## 2021-10-05 LAB
FLUAV RNA RESP QL NAA+PROBE: NOT DETECTED
FLUBV RNA RESP QL NAA+PROBE: NOT DETECTED
SARS-COV-2 RNA RESP QL NAA+PROBE: NOT DETECTED

## 2021-10-05 PROCEDURE — 25010000002 MIDAZOLAM PER 1 MG: Performed by: ANESTHESIOLOGY

## 2021-10-05 PROCEDURE — 87205 SMEAR GRAM STAIN: CPT | Performed by: PLASTIC SURGERY

## 2021-10-05 PROCEDURE — 25010000002 CEFOXITIN PER 1 G: Performed by: PLASTIC SURGERY

## 2021-10-05 PROCEDURE — 87075 CULTR BACTERIA EXCEPT BLOOD: CPT | Performed by: PLASTIC SURGERY

## 2021-10-05 PROCEDURE — 25010000002 NEOSTIGMINE 10 MG/10ML SOLUTION: Performed by: ANESTHESIOLOGY

## 2021-10-05 PROCEDURE — 87636 SARSCOV2 & INF A&B AMP PRB: CPT

## 2021-10-05 PROCEDURE — 87176 TISSUE HOMOGENIZATION CULTR: CPT | Performed by: PLASTIC SURGERY

## 2021-10-05 PROCEDURE — 87070 CULTURE OTHR SPECIMN AEROBIC: CPT | Performed by: PLASTIC SURGERY

## 2021-10-05 PROCEDURE — 25010000003 CEFAZOLIN IN DEXTROSE 2-4 GM/100ML-% SOLUTION: Performed by: PLASTIC SURGERY

## 2021-10-05 PROCEDURE — 87116 MYCOBACTERIA CULTURE: CPT | Performed by: PLASTIC SURGERY

## 2021-10-05 PROCEDURE — 87015 SPECIMEN INFECT AGNT CONCNTJ: CPT | Performed by: PLASTIC SURGERY

## 2021-10-05 PROCEDURE — 87186 SC STD MICRODIL/AGAR DIL: CPT | Performed by: PLASTIC SURGERY

## 2021-10-05 PROCEDURE — 87206 SMEAR FLUORESCENT/ACID STAI: CPT | Performed by: PLASTIC SURGERY

## 2021-10-05 PROCEDURE — 25010000002 PROPOFOL 10 MG/ML EMULSION: Performed by: ANESTHESIOLOGY

## 2021-10-05 PROCEDURE — 87102 FUNGUS ISOLATION CULTURE: CPT | Performed by: PLASTIC SURGERY

## 2021-10-05 PROCEDURE — 19357 TISS XPNDR PLMT BRST RCNSTJ: CPT | Performed by: PHYSICIAN ASSISTANT

## 2021-10-05 PROCEDURE — 25010000003 LIDOCAINE 1 % SOLUTION: Performed by: ANESTHESIOLOGY

## 2021-10-05 PROCEDURE — 25010000002 GENTAMICIN PER 80 MG: Performed by: PLASTIC SURGERY

## 2021-10-05 PROCEDURE — C1789 PROSTHESIS, BREAST, IMP: HCPCS | Performed by: PLASTIC SURGERY

## 2021-10-05 PROCEDURE — 25010000002 FENTANYL CITRATE (PF) 50 MCG/ML SOLUTION: Performed by: ANESTHESIOLOGY

## 2021-10-05 PROCEDURE — C9803 HOPD COVID-19 SPEC COLLECT: HCPCS

## 2021-10-05 PROCEDURE — 25010000002 DEXAMETHASONE PER 1 MG: Performed by: ANESTHESIOLOGY

## 2021-10-05 PROCEDURE — 87077 CULTURE AEROBIC IDENTIFY: CPT | Performed by: PLASTIC SURGERY

## 2021-10-05 DEVICE — BRST GEL NATRELLE INSPIRA SMOTH COHESIVE XF/P 525CC: Type: IMPLANTABLE DEVICE | Site: BREAST | Status: FUNCTIONAL

## 2021-10-05 RX ORDER — ROCURONIUM BROMIDE 10 MG/ML
INJECTION, SOLUTION INTRAVENOUS AS NEEDED
Status: DISCONTINUED | OUTPATIENT
Start: 2021-10-05 | End: 2021-10-05 | Stop reason: SURG

## 2021-10-05 RX ORDER — BUPIVACAINE HCL/0.9 % NACL/PF 0.125 %
PLASTIC BAG, INJECTION (ML) EPIDURAL AS NEEDED
Status: DISCONTINUED | OUTPATIENT
Start: 2021-10-05 | End: 2021-10-05 | Stop reason: SURG

## 2021-10-05 RX ORDER — DOXYCYCLINE HYCLATE 100 MG/1
100 CAPSULE ORAL 2 TIMES DAILY
Qty: 20 CAPSULE | Refills: 0 | Status: ON HOLD | OUTPATIENT
Start: 2021-10-05 | End: 2021-10-19

## 2021-10-05 RX ORDER — MIDAZOLAM HYDROCHLORIDE 1 MG/ML
1 INJECTION INTRAMUSCULAR; INTRAVENOUS
Status: DISCONTINUED | OUTPATIENT
Start: 2021-10-05 | End: 2021-10-05 | Stop reason: HOSPADM

## 2021-10-05 RX ORDER — CEFAZOLIN SODIUM 2 G/100ML
2 INJECTION, SOLUTION INTRAVENOUS ONCE
Status: COMPLETED | OUTPATIENT
Start: 2021-10-05 | End: 2021-10-05

## 2021-10-05 RX ORDER — MAGNESIUM HYDROXIDE 1200 MG/15ML
LIQUID ORAL AS NEEDED
Status: DISCONTINUED | OUTPATIENT
Start: 2021-10-05 | End: 2021-10-05 | Stop reason: HOSPADM

## 2021-10-05 RX ORDER — GLYCOPYRROLATE 0.2 MG/ML
INJECTION INTRAMUSCULAR; INTRAVENOUS AS NEEDED
Status: DISCONTINUED | OUTPATIENT
Start: 2021-10-05 | End: 2021-10-05 | Stop reason: SURG

## 2021-10-05 RX ORDER — HYDROMORPHONE HYDROCHLORIDE 1 MG/ML
0.5 INJECTION, SOLUTION INTRAMUSCULAR; INTRAVENOUS; SUBCUTANEOUS
Status: DISCONTINUED | OUTPATIENT
Start: 2021-10-05 | End: 2021-10-05 | Stop reason: HOSPADM

## 2021-10-05 RX ORDER — MIDAZOLAM HYDROCHLORIDE 1 MG/ML
2 INJECTION INTRAMUSCULAR; INTRAVENOUS ONCE
Status: COMPLETED | OUTPATIENT
Start: 2021-10-05 | End: 2021-10-05

## 2021-10-05 RX ORDER — DEXAMETHASONE SODIUM PHOSPHATE 4 MG/ML
INJECTION, SOLUTION INTRA-ARTICULAR; INTRALESIONAL; INTRAMUSCULAR; INTRAVENOUS; SOFT TISSUE AS NEEDED
Status: DISCONTINUED | OUTPATIENT
Start: 2021-10-05 | End: 2021-10-05 | Stop reason: SURG

## 2021-10-05 RX ORDER — PROPOFOL 10 MG/ML
VIAL (ML) INTRAVENOUS AS NEEDED
Status: DISCONTINUED | OUTPATIENT
Start: 2021-10-05 | End: 2021-10-05 | Stop reason: SURG

## 2021-10-05 RX ORDER — FENTANYL CITRATE 50 UG/ML
INJECTION, SOLUTION INTRAMUSCULAR; INTRAVENOUS AS NEEDED
Status: DISCONTINUED | OUTPATIENT
Start: 2021-10-05 | End: 2021-10-05 | Stop reason: SURG

## 2021-10-05 RX ORDER — SODIUM CHLORIDE, SODIUM LACTATE, POTASSIUM CHLORIDE, CALCIUM CHLORIDE 600; 310; 30; 20 MG/100ML; MG/100ML; MG/100ML; MG/100ML
9 INJECTION, SOLUTION INTRAVENOUS CONTINUOUS
Status: DISCONTINUED | OUTPATIENT
Start: 2021-10-05 | End: 2021-10-05 | Stop reason: HOSPADM

## 2021-10-05 RX ORDER — LIDOCAINE HYDROCHLORIDE 10 MG/ML
0.5 INJECTION, SOLUTION EPIDURAL; INFILTRATION; INTRACAUDAL; PERINEURAL ONCE AS NEEDED
Status: DISCONTINUED | OUTPATIENT
Start: 2021-10-05 | End: 2021-10-05 | Stop reason: HOSPADM

## 2021-10-05 RX ORDER — LIDOCAINE HYDROCHLORIDE AND EPINEPHRINE 10; 10 MG/ML; UG/ML
INJECTION, SOLUTION INFILTRATION; PERINEURAL AS NEEDED
Status: DISCONTINUED | OUTPATIENT
Start: 2021-10-05 | End: 2021-10-05 | Stop reason: HOSPADM

## 2021-10-05 RX ORDER — SODIUM CHLORIDE 0.9 % (FLUSH) 0.9 %
10 SYRINGE (ML) INJECTION AS NEEDED
Status: DISCONTINUED | OUTPATIENT
Start: 2021-10-05 | End: 2021-10-05 | Stop reason: HOSPADM

## 2021-10-05 RX ORDER — NEOSTIGMINE METHYLSULFATE 1 MG/ML
INJECTION, SOLUTION INTRAVENOUS AS NEEDED
Status: DISCONTINUED | OUTPATIENT
Start: 2021-10-05 | End: 2021-10-05 | Stop reason: SURG

## 2021-10-05 RX ORDER — FENTANYL CITRATE 50 UG/ML
50 INJECTION, SOLUTION INTRAMUSCULAR; INTRAVENOUS
Status: DISCONTINUED | OUTPATIENT
Start: 2021-10-05 | End: 2021-10-05 | Stop reason: HOSPADM

## 2021-10-05 RX ORDER — FAMOTIDINE 10 MG/ML
20 INJECTION, SOLUTION INTRAVENOUS ONCE
Status: CANCELLED | OUTPATIENT
Start: 2021-10-05 | End: 2021-10-05

## 2021-10-05 RX ORDER — SODIUM CHLORIDE 0.9 % (FLUSH) 0.9 %
10 SYRINGE (ML) INJECTION EVERY 12 HOURS SCHEDULED
Status: DISCONTINUED | OUTPATIENT
Start: 2021-10-05 | End: 2021-10-05 | Stop reason: HOSPADM

## 2021-10-05 RX ORDER — LIDOCAINE HYDROCHLORIDE 10 MG/ML
INJECTION, SOLUTION INFILTRATION; PERINEURAL AS NEEDED
Status: DISCONTINUED | OUTPATIENT
Start: 2021-10-05 | End: 2021-10-05 | Stop reason: SURG

## 2021-10-05 RX ORDER — FAMOTIDINE 20 MG/1
20 TABLET, FILM COATED ORAL ONCE
Status: COMPLETED | OUTPATIENT
Start: 2021-10-05 | End: 2021-10-05

## 2021-10-05 RX ADMIN — ROCURONIUM BROMIDE 40 MG: 10 INJECTION INTRAVENOUS at 18:45

## 2021-10-05 RX ADMIN — FENTANYL CITRATE 100 MCG: 50 INJECTION, SOLUTION INTRAMUSCULAR; INTRAVENOUS at 18:45

## 2021-10-05 RX ADMIN — FAMOTIDINE 20 MG: 20 TABLET ORAL at 17:03

## 2021-10-05 RX ADMIN — LIDOCAINE HYDROCHLORIDE 40 MG: 10 INJECTION, SOLUTION INFILTRATION; PERINEURAL at 18:45

## 2021-10-05 RX ADMIN — SODIUM CHLORIDE, POTASSIUM CHLORIDE, SODIUM LACTATE AND CALCIUM CHLORIDE 9 ML/HR: 600; 310; 30; 20 INJECTION, SOLUTION INTRAVENOUS at 17:03

## 2021-10-05 RX ADMIN — PROPOFOL 160 MG: 10 INJECTION, EMULSION INTRAVENOUS at 18:45

## 2021-10-05 RX ADMIN — GLYCOPYRROLATE 0.4 MG: 0.2 INJECTION INTRAMUSCULAR; INTRAVENOUS at 19:24

## 2021-10-05 RX ADMIN — CEFAZOLIN SODIUM 2 G: 2 INJECTION, SOLUTION INTRAVENOUS at 18:49

## 2021-10-05 RX ADMIN — MIDAZOLAM HYDROCHLORIDE 2 MG: 1 INJECTION, SOLUTION INTRAMUSCULAR; INTRAVENOUS at 18:27

## 2021-10-05 RX ADMIN — NEOSTIGMINE 2 MG: 1 INJECTION INTRAVENOUS at 19:24

## 2021-10-05 RX ADMIN — Medication 100 MCG: at 19:06

## 2021-10-05 RX ADMIN — DEXAMETHASONE SODIUM PHOSPHATE 8 MG: 4 INJECTION, SOLUTION INTRA-ARTICULAR; INTRALESIONAL; INTRAMUSCULAR; INTRAVENOUS; SOFT TISSUE at 19:00

## 2021-10-05 NOTE — BRIEF OP NOTE
BREAST IMPLANT REVISION AND/OR REMOVAL  Progress Note    Maryana Love  10/5/2021    Pre-op Diagnosis:   * Acquired absence of breast and absent nipple, bilateral [Z90.13]     * Personal history of breast cancer [Z85.3]     * Open wound of breast [S21.009A]         Post-Op Diagnosis Codes:     * Acquired absence of breast and absent nipple, bilateral [Z90.13]     * Personal history of breast cancer [Z85.3]     * Open wound of breast [S21.009A]    Procedure/CPT® Codes:  CT INSJ/RPLCMT BREAST IMPLANT SEP DAY MASTECTOMY [95490]      Procedure(s):  BREAST IMPLANT REVISION AND REMOVAL    Surgeon(s):  José Miguel Chatman MD    Anesthesia: General    Staff:   Circulator: Billy Bustamante RN; Shanice Pacehco RN  Scrub Person: Brock Pratt  Assistant: Atiya Milton PA  Assistant: Atiya Milton PA      Estimated Blood Loss: none    Urine Voided: * No values recorded between 10/5/2021  6:40 PM and 10/5/2021  7:28 PM *    Specimens:                None          Drains: * No LDAs found *    Findings: absent breast with left breast open wound and no breast implant pocket compromise    Complications: none immediate    Assistant: Atiya Mliton PA  was responsible for performing the following activities: Retraction, Suction, Irrigation, Suturing, Closing and Placing Dressing and their skilled assistance was necessary for the success of this case.    José Miguel Chatman MD     Date: 10/5/2021  Time: 19:28 EDT

## 2021-10-05 NOTE — OP NOTE
Preoperative diagnosis: 1.  Personal history of breast cancer  2.  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  Open wound left reconstructed breast    Postoperative diagnosis: 1.Personal history of breast cancer  2.  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  Open wound left reconstructed breast    Surgeon: José Miguel Chatman MD    Assistant: Primo BARRAZA was responsible for performing the following activities: Retraction, suction, irrigation, suturing, closing, and placing dressing and their skilled assistance was necessary for the success of this case    Anesthesia: General    Procedure: 1.  Replacement of her left reconstructed breast implant with a new Natsave Inspira style SCX volume 525 cc serial #[id number]    Indication: The patient is a 46-year-old white female who presents my office today with a recent history of drainage from her breast incision and a fever to 102 degrees.  She she had recently undergone bilateral tissue expander exchange to permanent silicone gel implants.  She was noted to have an open wound at her incision site.  I recommended exploration with possible replacement or possible removal.  All techniques potential complications and typical postoperative course were discussed with the patient.  She indicated her understanding wish to proceed.    Findings: 1.  Open wound left reconstructed breast  2.  No contamination of breast pocket evident.  Scant fluid within the breast pocket.    Description: The patient was taken from preoperative holding to the operating room after informed consent was signed and on the chart and placed under general anesthesia successfully.  Prior to induction of anesthesia, the patient received a prophylactic dose of antibiotics and had bilateral lower extremity sequential compression devices in place and operational.  She is placed supine on the operating room table.  She the pillow placed beneath her knees.  Her arms are gently abducted to 90  degrees and she had ulnar nerve padding.  Her chest wall was prepped and draped in the usual sterile fashion.  After proper identifying the patient the patient's problem, a marking pen was used to isis her left reconstructed breast mastectomy incision and wound.  The area was infiltrated with lidocaine and epinephrine.  A 10 blade was used to incise the skin.  The subcutaneous tissue was dissected with a 10 blade.  This tissue was sent to microbiology for culture.  Upon removal of the wound, the subcutaneous tissue looked pristine.  The pocket was opened with Metzenbaum scissors.  There was no fluid around the implant.  The implant was removed and placed into a Betadine and antibiotic bath.  A scant amount of fluid which was nonpurulent was cultured as well.  The pocket was irrigated with an antibiotic and Betadine solution.  The decision was made to replace the old implant with a new implant.  The AlloDerm was tagged with 3-0 Vicryl pop-off sutures.  My gloves were changed.  The planned implant was then inserted in the breast pocket via a Norman funnel and oriented and seated properly.  The Vicryl ties were then tied down.  The wound was then closed in 2 layers.  First, subcutaneous tissue was brought together with 3-0 Vicryl suture in a simple interrupted fashion.  Next, the skin was closed with 3-0 Monocryl suture in a deep dermal buried interrupted fashion and 3-0 strata fix suture in an intracuticular fashion.  Skin glue was applied.  The patient's chest wall was dressed with Kerlix fluffs and a surgical bra.  The case was turned over to anesthesia which point the patient was awoken from general anesthesia successfully and taken to PACU in stable condition.  I was present for the entire procedure.  All counts were correct.    Estimated blood loss: Minimal    Drains: None    Complications: None immediate

## 2021-10-05 NOTE — H&P
Chief complaint: Fever with drainage of her left reconstructed breast    History of present illness: The patient is a 46-year-old white female who underwent bilateral tissue expander exchange to permanent silicone gel implants on 9/23/2021 who developed drainage of her left reconstructed breast 2 days ago.  Overnight, the patient developed a fever to 102 degrees.  She was seen in my office today by my partner and noted to have an open wound of her left mastectomy incision with drainage.  Due to the history and the physical exam, I recommended surgical intervention.    Past medical history: 1.  History of left breast cancer  2.Hemangioma of liver  3.  Bipolar affective disorder    Past surgical history: 1.  Abdominal plasty  2.  Bladder surgery  3.  Bilateral mastectomy  4.  Bilateral breast reconstruction  5.  Gastric sleeve  6.  STACI/BSO    Medications: 1.  Campral  2.  Wellbutrin  3.  Vitamin D  3.  Naltrexone  4.  Lamictal    Allergies: 1.  Nitrofurantoin  2.  Aripiprazole  3.  Oxycodone    Social history: 1.  History of tobacco abuse  2.  Recent alcohol rehab admission    Physical exam: 97.2 heart rate 76 blood pressure 117/58 respiration 16    No acute distress  Awake alert and oriented x3  Left reconstructed breast with open wound and drainage on her dressing  Normal expansion  Regular rate and rhythm    Assessment and plan: Status post bilateral tissue expander exchange to permanent silicone gel implants with a possibly infected left reconstructed breast implant.  I recommended exploration with possible removal or replacement.  The technique potential complications and typical postoperative course have been discussed with the patient.  She indicated her understanding wish to proceed.

## 2021-10-05 NOTE — ANESTHESIA POSTPROCEDURE EVALUATION
Patient: Maryana Love    Procedure Summary     Date: 10/05/21 Room / Location:  [facility] / [facility]    Anesthesia Start: 1839 Anesthesia Stop: 1952    Procedure: BREAST IMPLANT REVISION AND REMOVAL (Left Breast) Diagnosis:       Acquired absence of breast and absent nipple, bilateral      Personal history of breast cancer      Open wound of breast    Surgeons: José Miguel Chatman MD Provider: Terence Felipe MD    Anesthesia Type: general ASA Status: 3          Anesthesia Type: general    Vitals  No vitals data found for the desired time range.          Anesthesia Post Evaluation

## 2021-10-05 NOTE — ANESTHESIA PREPROCEDURE EVALUATION
Anesthesia Evaluation     Patient summary reviewed and Nursing notes reviewed   no history of anesthetic complications:  NPO Solid Status: > 8 hours  NPO Liquid Status: > 2 hours           Airway   Mallampati: I  TM distance: >3 FB  Neck ROM: full  No difficulty expected  Dental - normal exam     Pulmonary - normal exam    breath sounds clear to auscultation  (+) a smoker (Vapes),   Cardiovascular - negative cardio ROS and normal exam  Exercise tolerance: good (4-7 METS)    Rhythm: regular  Rate: normal        Neuro/Psych  (+) tremors, psychiatric history Bipolar,     GI/Hepatic/Renal/Endo    (+)   liver disease,     Musculoskeletal (-) negative ROS    Abdominal   (-) obese    Abdomen: soft.   Substance History   (+) alcohol use (None since 5/21),      OB/GYN          Other      history of cancer (Breast ca s/p mastectomy + implants)                  Anesthesia Plan    ASA 3     general     intravenous induction     Anesthetic plan, all risks, benefits, and alternatives have been provided, discussed and informed consent has been obtained with: patient.

## 2021-10-05 NOTE — ANESTHESIA PROCEDURE NOTES
Airway  Urgency: elective    Date/Time: 10/5/2021 6:47 PM  Airway not difficult    General Information and Staff    Patient location during procedure: OR  Anesthesiologist: Terence Felipe MD    Indications and Patient Condition  Indications for airway management: airway protection    Preoxygenated: yes  MILS not maintained throughout  Mask difficulty assessment: 1 - vent by mask    Final Airway Details  Final airway type: endotracheal airway      Successful airway: ETT  Cuffed: yes   Successful intubation technique: direct laryngoscopy  Endotracheal tube insertion site: oral  Blade: Geovanna  Blade size: 3  ETT size (mm): 7.5  Cormack-Lehane Classification: grade I - full view of glottis  Placement verified by: chest auscultation and capnometry   Measured from: lips  ETT/EBT  to lips (cm): 20  Number of attempts at approach: 1  Assessment: lips, teeth, and gum same as pre-op and atraumatic intubation    Additional Comments  Negative epigastric sounds, Breath sound equal bilaterally with symmetric chest rise and fall

## 2021-10-05 NOTE — ANESTHESIA POSTPROCEDURE EVALUATION
Patient: Maryana Love    Procedure Summary     Date: 10/05/21 Room / Location:  [facility] / [facility]    Anesthesia Start: 1839 Anesthesia Stop: 1952    Procedure: BREAST IMPLANT REVISION AND REMOVAL (Left Breast) Diagnosis:       Acquired absence of breast and absent nipple, bilateral      Personal history of breast cancer      Open wound of breast    Surgeons: José Miguel Chatman MD Provider: Terence Felipe MD    Anesthesia Type: general ASA Status: 3          Anesthesia Type: general    Vitals  No vitals data found for the desired time range.          Post Anesthesia Care and Evaluation    Patient location during evaluation: PACU  Patient participation: complete - patient participated  Level of consciousness: awake and alert  Pain management: adequate  Airway patency: patent  Anesthetic complications: No anesthetic complications  PONV Status: none  Cardiovascular status: hemodynamically stable and acceptable  Respiratory status: nonlabored ventilation, acceptable and nasal cannula  Hydration status: acceptable

## 2021-10-08 LAB
BACTERIA SPEC AEROBE CULT: ABNORMAL
BACTERIA SPEC AEROBE CULT: NORMAL
GRAM STN SPEC: ABNORMAL
GRAM STN SPEC: ABNORMAL
GRAM STN SPEC: NORMAL
GRAM STN SPEC: NORMAL

## 2021-10-10 LAB
BACTERIA SPEC ANAEROBE CULT: NORMAL
BACTERIA SPEC ANAEROBE CULT: NORMAL

## 2021-10-13 ENCOUNTER — APPOINTMENT (OUTPATIENT)
Dept: PREADMISSION TESTING | Facility: HOSPITAL | Age: 46
End: 2021-10-13

## 2021-10-17 ENCOUNTER — APPOINTMENT (OUTPATIENT)
Dept: PREADMISSION TESTING | Facility: HOSPITAL | Age: 46
End: 2021-10-17

## 2021-10-17 LAB — SARS-COV-2 RNA PNL SPEC NAA+PROBE: NOT DETECTED

## 2021-10-17 PROCEDURE — C9803 HOPD COVID-19 SPEC COLLECT: HCPCS

## 2021-10-17 PROCEDURE — U0004 COV-19 TEST NON-CDC HGH THRU: HCPCS

## 2021-10-18 ENCOUNTER — ANESTHESIA EVENT (OUTPATIENT)
Dept: PERIOP | Facility: HOSPITAL | Age: 46
End: 2021-10-18

## 2021-10-18 RX ORDER — FAMOTIDINE 10 MG/ML
20 INJECTION, SOLUTION INTRAVENOUS ONCE
Status: CANCELLED | OUTPATIENT
Start: 2021-10-18 | End: 2021-10-18

## 2021-10-19 ENCOUNTER — ANESTHESIA (OUTPATIENT)
Dept: PERIOP | Facility: HOSPITAL | Age: 46
End: 2021-10-19

## 2021-10-19 ENCOUNTER — HOSPITAL ENCOUNTER (OUTPATIENT)
Facility: HOSPITAL | Age: 46
Setting detail: HOSPITAL OUTPATIENT SURGERY
Discharge: HOME | End: 2021-10-19
Attending: PLASTIC SURGERY | Admitting: PLASTIC SURGERY

## 2021-10-19 VITALS
TEMPERATURE: 98.2 F | HEIGHT: 65 IN | DIASTOLIC BLOOD PRESSURE: 80 MMHG | RESPIRATION RATE: 18 BRPM | OXYGEN SATURATION: 99 % | HEART RATE: 69 BPM | BODY MASS INDEX: 21.49 KG/M2 | SYSTOLIC BLOOD PRESSURE: 141 MMHG | WEIGHT: 129 LBS

## 2021-10-19 DIAGNOSIS — C50.412 MALIGNANT NEOPLASM OF UPPER-OUTER QUADRANT OF LEFT FEMALE BREAST, UNSPECIFIED ESTROGEN RECEPTOR STATUS (HCC): Primary | ICD-10-CM

## 2021-10-19 PROCEDURE — 25010000002 MIDAZOLAM PER 1 MG: Performed by: ANESTHESIOLOGY

## 2021-10-19 PROCEDURE — 25010000002 PROPOFOL 10 MG/ML EMULSION: Performed by: NURSE ANESTHETIST, CERTIFIED REGISTERED

## 2021-10-19 PROCEDURE — 25010000002 ONDANSETRON PER 1 MG: Performed by: NURSE ANESTHETIST, CERTIFIED REGISTERED

## 2021-10-19 PROCEDURE — 25010000002 DEXAMETHASONE PER 1 MG: Performed by: NURSE ANESTHETIST, CERTIFIED REGISTERED

## 2021-10-19 PROCEDURE — 25010000003 CEFAZOLIN IN DEXTROSE 2-4 GM/100ML-% SOLUTION: Performed by: PLASTIC SURGERY

## 2021-10-19 PROCEDURE — 25010000002 HYDROMORPHONE 1 MG/ML SOLUTION

## 2021-10-19 PROCEDURE — 25010000002 MIDAZOLAM PER 1 MG: Performed by: NURSE ANESTHETIST, CERTIFIED REGISTERED

## 2021-10-19 PROCEDURE — 25010000002 DIPHENHYDRAMINE PER 50 MG

## 2021-10-19 PROCEDURE — 25010000002 FENTANYL CITRATE (PF) 50 MCG/ML SOLUTION: Performed by: NURSE ANESTHETIST, CERTIFIED REGISTERED

## 2021-10-19 PROCEDURE — 25010000002 NEOSTIGMINE 10 MG/10ML SOLUTION: Performed by: NURSE ANESTHETIST, CERTIFIED REGISTERED

## 2021-10-19 PROCEDURE — C1889 IMPLANT/INSERT DEVICE, NOC: HCPCS | Performed by: PLASTIC SURGERY

## 2021-10-19 DEVICE — KNOTLESS TISSUE CONTROL DEVICE, UNDYED UNIDIRECTIONAL (ANTIBACTERIAL) SYNTHETIC ABSORBABLE DEVICE
Type: IMPLANTABLE DEVICE | Site: BREAST | Status: FUNCTIONAL
Brand: STRATAFIX

## 2021-10-19 RX ORDER — OXYCODONE HYDROCHLORIDE 5 MG/1
TABLET ORAL
Status: COMPLETED
Start: 2021-10-19 | End: 2021-10-19

## 2021-10-19 RX ORDER — OXYCODONE HYDROCHLORIDE 5 MG/1
5 TABLET ORAL EVERY 6 HOURS PRN
Qty: 20 TABLET | Refills: 0 | Status: SHIPPED | OUTPATIENT
Start: 2021-10-19 | End: 2022-01-19

## 2021-10-19 RX ORDER — ROCURONIUM BROMIDE 10 MG/ML
INJECTION, SOLUTION INTRAVENOUS AS NEEDED
Status: DISCONTINUED | OUTPATIENT
Start: 2021-10-19 | End: 2021-10-19 | Stop reason: SURG

## 2021-10-19 RX ORDER — CEFAZOLIN SODIUM 2 G/100ML
2 INJECTION, SOLUTION INTRAVENOUS ONCE
Status: COMPLETED | OUTPATIENT
Start: 2021-10-19 | End: 2021-10-19

## 2021-10-19 RX ORDER — SODIUM CHLORIDE 0.9 % (FLUSH) 0.9 %
10 SYRINGE (ML) INJECTION AS NEEDED
Status: DISCONTINUED | OUTPATIENT
Start: 2021-10-19 | End: 2021-10-19 | Stop reason: HOSPADM

## 2021-10-19 RX ORDER — ONDANSETRON 2 MG/ML
INJECTION INTRAMUSCULAR; INTRAVENOUS AS NEEDED
Status: DISCONTINUED | OUTPATIENT
Start: 2021-10-19 | End: 2021-10-19 | Stop reason: SURG

## 2021-10-19 RX ORDER — GLYCOPYRROLATE 0.2 MG/ML
INJECTION INTRAMUSCULAR; INTRAVENOUS AS NEEDED
Status: DISCONTINUED | OUTPATIENT
Start: 2021-10-19 | End: 2021-10-19 | Stop reason: SURG

## 2021-10-19 RX ORDER — DEXAMETHASONE SODIUM PHOSPHATE 4 MG/ML
INJECTION, SOLUTION INTRA-ARTICULAR; INTRALESIONAL; INTRAMUSCULAR; INTRAVENOUS; SOFT TISSUE AS NEEDED
Status: DISCONTINUED | OUTPATIENT
Start: 2021-10-19 | End: 2021-10-19 | Stop reason: SURG

## 2021-10-19 RX ORDER — DIPHENHYDRAMINE HYDROCHLORIDE 50 MG/ML
50 INJECTION INTRAMUSCULAR; INTRAVENOUS ONCE
Status: DISCONTINUED | OUTPATIENT
Start: 2021-10-19 | End: 2021-10-20 | Stop reason: HOSPADM

## 2021-10-19 RX ORDER — LIDOCAINE HYDROCHLORIDE 10 MG/ML
0.5 INJECTION, SOLUTION EPIDURAL; INFILTRATION; INTRACAUDAL; PERINEURAL ONCE AS NEEDED
Status: DISCONTINUED | OUTPATIENT
Start: 2021-10-19 | End: 2021-10-19

## 2021-10-19 RX ORDER — LIDOCAINE HYDROCHLORIDE AND EPINEPHRINE 10; 10 MG/ML; UG/ML
INJECTION, SOLUTION INFILTRATION; PERINEURAL AS NEEDED
Status: DISCONTINUED | OUTPATIENT
Start: 2021-10-19 | End: 2021-10-19 | Stop reason: HOSPADM

## 2021-10-19 RX ORDER — MIDAZOLAM HYDROCHLORIDE 1 MG/ML
1 INJECTION INTRAMUSCULAR; INTRAVENOUS
Status: DISCONTINUED | OUTPATIENT
Start: 2021-10-19 | End: 2021-10-19 | Stop reason: HOSPADM

## 2021-10-19 RX ORDER — SODIUM CHLORIDE 0.9 % (FLUSH) 0.9 %
10 SYRINGE (ML) INJECTION EVERY 12 HOURS SCHEDULED
Status: DISCONTINUED | OUTPATIENT
Start: 2021-10-19 | End: 2021-10-19 | Stop reason: HOSPADM

## 2021-10-19 RX ORDER — HYDROMORPHONE HYDROCHLORIDE 1 MG/ML
0.5 INJECTION, SOLUTION INTRAMUSCULAR; INTRAVENOUS; SUBCUTANEOUS
Status: DISCONTINUED | OUTPATIENT
Start: 2021-10-19 | End: 2021-10-20 | Stop reason: HOSPADM

## 2021-10-19 RX ORDER — SODIUM CHLORIDE, SODIUM LACTATE, POTASSIUM CHLORIDE, CALCIUM CHLORIDE 600; 310; 30; 20 MG/100ML; MG/100ML; MG/100ML; MG/100ML
9 INJECTION, SOLUTION INTRAVENOUS CONTINUOUS
Status: DISCONTINUED | OUTPATIENT
Start: 2021-10-19 | End: 2021-10-20 | Stop reason: HOSPADM

## 2021-10-19 RX ORDER — FENTANYL CITRATE 50 UG/ML
50 INJECTION, SOLUTION INTRAMUSCULAR; INTRAVENOUS
Status: DISCONTINUED | OUTPATIENT
Start: 2021-10-19 | End: 2021-10-20 | Stop reason: HOSPADM

## 2021-10-19 RX ORDER — MIDAZOLAM HYDROCHLORIDE 1 MG/ML
2 INJECTION INTRAMUSCULAR; INTRAVENOUS ONCE
Status: COMPLETED | OUTPATIENT
Start: 2021-10-19 | End: 2021-10-19

## 2021-10-19 RX ORDER — FAMOTIDINE 20 MG/1
20 TABLET, FILM COATED ORAL ONCE
Status: COMPLETED | OUTPATIENT
Start: 2021-10-19 | End: 2021-10-19

## 2021-10-19 RX ORDER — DIPHENHYDRAMINE HYDROCHLORIDE 50 MG/ML
INJECTION INTRAMUSCULAR; INTRAVENOUS
Status: COMPLETED
Start: 2021-10-19 | End: 2021-10-19

## 2021-10-19 RX ORDER — MAGNESIUM HYDROXIDE 1200 MG/15ML
LIQUID ORAL AS NEEDED
Status: DISCONTINUED | OUTPATIENT
Start: 2021-10-19 | End: 2021-10-19 | Stop reason: HOSPADM

## 2021-10-19 RX ORDER — NEOSTIGMINE METHYLSULFATE 1 MG/ML
INJECTION, SOLUTION INTRAVENOUS AS NEEDED
Status: DISCONTINUED | OUTPATIENT
Start: 2021-10-19 | End: 2021-10-19 | Stop reason: SURG

## 2021-10-19 RX ORDER — PROPOFOL 10 MG/ML
VIAL (ML) INTRAVENOUS AS NEEDED
Status: DISCONTINUED | OUTPATIENT
Start: 2021-10-19 | End: 2021-10-19 | Stop reason: SURG

## 2021-10-19 RX ORDER — MIDAZOLAM HYDROCHLORIDE 1 MG/ML
INJECTION INTRAMUSCULAR; INTRAVENOUS AS NEEDED
Status: DISCONTINUED | OUTPATIENT
Start: 2021-10-19 | End: 2021-10-19 | Stop reason: SURG

## 2021-10-19 RX ORDER — FENTANYL CITRATE 50 UG/ML
INJECTION, SOLUTION INTRAMUSCULAR; INTRAVENOUS AS NEEDED
Status: DISCONTINUED | OUTPATIENT
Start: 2021-10-19 | End: 2021-10-19 | Stop reason: SURG

## 2021-10-19 RX ORDER — LIDOCAINE HYDROCHLORIDE 10 MG/ML
INJECTION, SOLUTION EPIDURAL; INFILTRATION; INTRACAUDAL; PERINEURAL AS NEEDED
Status: DISCONTINUED | OUTPATIENT
Start: 2021-10-19 | End: 2021-10-19 | Stop reason: SURG

## 2021-10-19 RX ORDER — OXYCODONE HYDROCHLORIDE AND ACETAMINOPHEN 5; 325 MG/1; MG/1
1 TABLET ORAL ONCE
Status: CANCELLED | OUTPATIENT
Start: 2021-10-19

## 2021-10-19 RX ADMIN — PROPOFOL 50 MCG/KG/MIN: 10 INJECTION, EMULSION INTRAVENOUS at 11:48

## 2021-10-19 RX ADMIN — FENTANYL CITRATE 100 MCG: 50 INJECTION, SOLUTION INTRAMUSCULAR; INTRAVENOUS at 11:35

## 2021-10-19 RX ADMIN — MIDAZOLAM HYDROCHLORIDE 2 MG: 1 INJECTION, SOLUTION INTRAMUSCULAR; INTRAVENOUS at 11:35

## 2021-10-19 RX ADMIN — ONDANSETRON 4 MG: 2 INJECTION INTRAMUSCULAR; INTRAVENOUS at 12:15

## 2021-10-19 RX ADMIN — FAMOTIDINE 20 MG: 20 TABLET ORAL at 09:40

## 2021-10-19 RX ADMIN — FENTANYL CITRATE 50 MCG: 50 INJECTION, SOLUTION INTRAMUSCULAR; INTRAVENOUS at 13:08

## 2021-10-19 RX ADMIN — ROCURONIUM BROMIDE 20 MG: 10 INJECTION, SOLUTION INTRAVENOUS at 12:13

## 2021-10-19 RX ADMIN — SODIUM CHLORIDE, POTASSIUM CHLORIDE, SODIUM LACTATE AND CALCIUM CHLORIDE: 600; 310; 30; 20 INJECTION, SOLUTION INTRAVENOUS at 12:43

## 2021-10-19 RX ADMIN — LIDOCAINE HYDROCHLORIDE 50 MG: 10 INJECTION, SOLUTION EPIDURAL; INFILTRATION; INTRACAUDAL; PERINEURAL at 11:35

## 2021-10-19 RX ADMIN — NEOSTIGMINE METHYLSULFATE 2 MG: 0.5 INJECTION INTRAVENOUS at 12:55

## 2021-10-19 RX ADMIN — ROCURONIUM BROMIDE 30 MG: 10 INJECTION, SOLUTION INTRAVENOUS at 11:35

## 2021-10-19 RX ADMIN — HYDROMORPHONE HYDROCHLORIDE 0.5 MG: 1 INJECTION, SOLUTION INTRAMUSCULAR; INTRAVENOUS; SUBCUTANEOUS at 13:41

## 2021-10-19 RX ADMIN — MIDAZOLAM HYDROCHLORIDE 2 MG: 1 INJECTION, SOLUTION INTRAMUSCULAR; INTRAVENOUS at 11:20

## 2021-10-19 RX ADMIN — PROPOFOL 150 MG: 10 INJECTION, EMULSION INTRAVENOUS at 11:35

## 2021-10-19 RX ADMIN — PROPOFOL 50 MG: 10 INJECTION, EMULSION INTRAVENOUS at 12:51

## 2021-10-19 RX ADMIN — CEFAZOLIN SODIUM 2 G: 2 INJECTION, SOLUTION INTRAVENOUS at 11:39

## 2021-10-19 RX ADMIN — DEXAMETHASONE SODIUM PHOSPHATE 4 MG: 4 INJECTION, SOLUTION INTRA-ARTICULAR; INTRALESIONAL; INTRAMUSCULAR; INTRAVENOUS; SOFT TISSUE at 12:15

## 2021-10-19 RX ADMIN — SODIUM CHLORIDE, POTASSIUM CHLORIDE, SODIUM LACTATE AND CALCIUM CHLORIDE 9 ML/HR: 600; 310; 30; 20 INJECTION, SOLUTION INTRAVENOUS at 09:33

## 2021-10-19 RX ADMIN — DIPHENHYDRAMINE HYDROCHLORIDE 25 MG: 50 INJECTION, SOLUTION INTRAMUSCULAR; INTRAVENOUS at 13:11

## 2021-10-19 RX ADMIN — FENTANYL CITRATE 50 MCG: 50 INJECTION, SOLUTION INTRAMUSCULAR; INTRAVENOUS at 12:25

## 2021-10-19 RX ADMIN — GLYCOPYRROLATE 0.4 MG: 0.2 INJECTION INTRAMUSCULAR; INTRAVENOUS at 12:55

## 2021-10-19 RX ADMIN — OXYCODONE 5 MG: 5 TABLET ORAL at 14:49

## 2021-10-19 NOTE — INTERVAL H&P NOTE
[facility] Pre-op    Full history and physical note from office is attached.    No recent fevers, chills or night sweats. [facility], Dr. Jay for abx management.    VS: /64  HR 69  RR 16  T 97.6 Sat 100%RA  LMP  (LMP Unknown) Comment: LAST MAMOGRAM 8/2015    Immunizations:  Influenza:  No  Pneumococcal:  No  Tetanus:  UTD  Covid x3: 2021    LAB Results:  Lab Results   Component Value Date    WBC 5.71 09/21/2021    HGB 13.2 09/21/2021    HCT 41.8 09/21/2021    .2 (H) 09/21/2021     09/21/2021    NEUTROABS 5.25 07/23/2021    GLUCOSE 90 09/21/2021    BUN 13 09/21/2021    CREATININE 0.88 09/21/2021    EGFRIFNONA 69 09/21/2021     09/21/2021    K 4.4 09/21/2021     09/21/2021    CO2 24.0 09/21/2021    CALCIUM 9.6 09/21/2021    ALBUMIN 4.10 07/23/2021    AST 21 07/23/2021    ALT 13 07/23/2021    BILITOT 0.4 07/23/2021     10/5/21 wound cx:  Tissue / Bone Culture - Tissue, Breast, Left  Order: [id number]   Status: Final result     Visible to patient: Yes (not seen)     Next appt: None     Dx: Breast infection in female    Specimen Information: Breast, Left; Tissue         0 Result Notes    Tissue Culture   Lab   Heavy growth (4+) Pseudomonas aeruginosa Abnormal    [facility]          Cancer Staging (if applicable)  Cancer Patient: __ yes __no __unknown__N/A; If yes, clinical stage T:__ N:__M:__, stage group or __N/A      Impression: Left breast pseudomonas       Plan: BREAST IMPLANT REMOVAL LEFT      Yazmin Singh, BEATRICE   10/19/2021   09:12 EDT

## 2021-10-19 NOTE — OP NOTE
Preoperative diagnosis: 1.  Personal history of breast cancer  2.  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  Left reconstructed breast infection    Postoperative diagnosis:  1.  Personal history of breast cancer  2.  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  Left reconstructed breast infection    Surgeon: José Miguel Chatman MD    Assistants:Angelina Kwan PA  was responsible for performing the following activities: Retraction, Suction, Irrigation, Suturing, Closing and Placing Dressing and their skilled assistance was necessary for the success of this case.    Anesthesia: General    Procedure: 1.  Left intact silicone gel implant removal and total capsulectomy    Indication: The patient is a 46-year-old white female who underwent bilateral tissue expander exchange to permanent silicone gel implants with significant capsular work several weeks ago.  Approximately 2 weeks after that surgery, she presented with drainage and a history of fevers and chills.  She was taken to the operating room the following day and underwent exploration.  At the time of that surgery, the infection was noted to be superficial and the implant was replaced.  Cultures did show that the fluid within the implant pocket was negative.  Unfortunately, the patient's incision did not heal and she currently has an open wound and some remaining erythema around the incision.  Due to these findings, I have recommended removal and total capsulectomy so as not to delay the inevitable and to prevent a serious infection.  She is understanding of the situation and wishes to proceed.  The technique potential complications and typical postoperative course were discussed with the patient.  She indicated her understanding and wished to proceed.    Findings: 1.  Left reconstructed breast with erythema around her incision and an open wound    Description: The patient was taken from preoperative holding to the operating room after informed  consent was signed and on the chart and placed under general anesthesia successfully.  Prior to induction of anesthesia, the patient received a prophylactic dose of antibiotics and had bilateral lower extremity sequential compression devices in place and operational.  She was placed supine on the operating room table.  She the pillow placed beneath her knees.  Her arms are gently abducted to 90 degrees and she had ulnar nerve padding.  Her chest wall was prepped and draped in the usual sterile fashion.  After proper identifying the patient the patient's problem, the left mastectomy incision was excised with a 10 blade.  The subcutaneous tissue was dissected with electrocautery.  The implant was approached and subsequently removed.  The AlloDerm was then dissected free of the surrounding tissue with Metzenbaum scissors.  Hemostasis was achieved with electrocautery.  A 15 Citizen of [place] Tobias drain was placed in the subcutaneous space, brought out the thoracoabdominal region, and secured with 3-0 nylon suture in a standard fashion.  The skin was closed with 3-0 Monocryl suture in a deep dermal buried interrupted fashion and 3-0 strata fix suture in an intracuticular fashion.  A Prevena incisional wound VAC was applied.  The drains were dressed with a Biopatch and a Tegaderm.  The case was turned over to anesthesia which point the patient was awoken from general anesthesia successfully and taken the PACU in stable condition.  I was present for the entire procedure.  All counts were correct.    Estimated blood loss: Minimal    Drains: 1    Complications: None immediate

## 2021-10-19 NOTE — BRIEF OP NOTE
BREAST IMPLANT REVISION AND/OR REMOVAL  Progress Note    Maryana Love  10/19/2021    Pre-op Diagnosis:   * Acquired absence of breast and absent nipple, bilateral [Z90.13]     * Personal history of breast cancer [Z85.3]     * Infection of breast implant (HCC) [T85.79XA]       Post-Op Diagnosis Codes:     * Acquired absence of breast and absent nipple, bilateral [Z90.13]     * Personal history of breast cancer [Z85.3]     * Infection of breast implant (HCC) [T85.79XA]    Procedure/CPT® Codes:  KS JOEL-IMPLANT CAPSULECTOMY BREAST COMPLETE [03138]      Procedure(s):  BREAST IMPLANT REMOVAL LEFT    Surgeon(s):  José Miguel Chatman MD    Anesthesia: General    Staff:   Circulator: Priyanka Fraga RN  Scrub Person: Charu Romero  Assistant: Angelina Kwan PA  Assistant: Angelina Kwan PA      Estimated Blood Loss: minimal    Urine Voided: * No values recorded between 10/19/2021 12:00 AM and 10/19/2021 11:25 AM *    Specimens:                None          Drains: * No LDAs found *    Findings: absent breast    Complications: none immediate    Assistant: Angelina Kwan PA  was responsible for performing the following activities: Retraction, Suction, Irrigation, Suturing, Closing and Placing Dressing and their skilled assistance was necessary for the success of this case.    José Miguel Chatman MD     Date: 10/19/2021  Time: 11:25 EDT

## 2021-10-19 NOTE — ANESTHESIA POSTPROCEDURE EVALUATION
Patient: Maryana Love    Procedure Summary     Date: 10/19/21 Room / Location:  [facility] / [facility]    Anesthesia Start: 1129 Anesthesia Stop: 1311    Procedure: BREAST IMPLANT REMOVAL LEFT (Left Breast) Diagnosis:       Acquired absence of breast and absent nipple, bilateral      Personal history of breast cancer      Infection of breast implant (HCC)    Surgeons: José Miguel Chatman MD Provider: Jesus Burroughs MD    Anesthesia Type: general ASA Status: 3          Anesthesia Type: general    Vitals  Vitals Value Taken Time   BP 97/72 10/19/21 1315   Temp     Pulse 92 10/19/21 1315   Resp     SpO2 95 % 10/19/21 1315           Post Anesthesia Care and Evaluation    Patient location during evaluation: PACU  Patient participation: complete - patient participated  Level of consciousness: awake and alert  Pain score: 0  Pain management: adequate  Airway patency: patent  Anesthetic complications: No anesthetic complications  PONV Status: none  Cardiovascular status: hemodynamically stable and acceptable  Respiratory status: nonlabored ventilation, acceptable, nasal cannula and spontaneous ventilation  Hydration status: acceptable

## 2021-10-19 NOTE — ANESTHESIA PROCEDURE NOTES
Airway  Urgency: elective    Date/Time: 10/19/2021 11:48 AM  Airway not difficult    General Information and Staff    Patient location during procedure: OR    Indications and Patient Condition  Indications for airway management: airway protection    Preoxygenated: yes  MILS not maintained throughout  Mask difficulty assessment: 1 - vent by mask    Final Airway Details  Final airway type: endotracheal airway      Successful airway: ETT  Cuffed: yes   Successful intubation technique: direct laryngoscopy  Endotracheal tube insertion site: oral  Blade: Ravi  Blade size: 2  ETT size (mm): 7.0  Cormack-Lehane Classification: grade I - full view of glottis  Placement verified by: chest auscultation and capnometry   Measured from: lips  ETT/EBT  to lips (cm): 20  Number of attempts at approach: 1  Assessment: lips, teeth, and gum same as pre-op and atraumatic intubation    Additional Comments  Negative epigastric sounds, Breath sound equal bilaterally with symmetric chest rise and fall

## 2021-10-19 NOTE — ANESTHESIA PREPROCEDURE EVALUATION
Anesthesia Evaluation     Patient summary reviewed and Nursing notes reviewed                Airway   Mallampati: II  TM distance: >3 FB  Neck ROM: full  No difficulty expected  Dental - normal exam     Pulmonary - normal exam   (+) a smoker Current,   Cardiovascular - negative cardio ROS and normal exam        Neuro/Psych- negative ROS  GI/Hepatic/Renal/Endo      ROS Comment: S/p gastric sleeve 2015    Musculoskeletal (-) negative ROS    Abdominal  - normal exam    Bowel sounds: normal.   Substance History   (+) alcohol use (campral tx; vivitrol tx last dose = 2 months ago),      OB/GYN negative ob/gyn ROS         Other      history of cancer (breast)                  Anesthesia Plan    ASA 3     general     intravenous induction     Anesthetic plan, all risks, benefits, and alternatives have been provided, discussed and informed consent has been obtained with: patient.    Plan discussed with CRNA.

## 2021-11-16 LAB
FUNGUS WND CULT: NORMAL
FUNGUS WND CULT: NORMAL
MYCOBACTERIUM SPEC CULT: NORMAL
NIGHT BLUE STAIN TISS: NORMAL

## 2021-11-23 LAB
MYCOBACTERIUM SPEC CULT: NORMAL
NIGHT BLUE STAIN TISS: NORMAL

## 2022-01-04 ENCOUNTER — APPOINTMENT (OUTPATIENT)
Dept: PREADMISSION TESTING | Facility: HOSPITAL | Age: 47
End: 2022-01-04

## 2022-01-19 ENCOUNTER — TELEMEDICINE (OUTPATIENT)
Dept: INTERNAL MEDICINE | Facility: CLINIC | Age: 47
End: 2022-01-19

## 2022-01-19 VITALS — TEMPERATURE: 99 F | WEIGHT: 134 LBS | BODY MASS INDEX: 22.3 KG/M2

## 2022-01-19 DIAGNOSIS — J06.9 UPPER RESPIRATORY TRACT INFECTION, UNSPECIFIED TYPE: ICD-10-CM

## 2022-01-19 DIAGNOSIS — J01.80 OTHER ACUTE SINUSITIS, RECURRENCE NOT SPECIFIED: Primary | ICD-10-CM

## 2022-01-19 DIAGNOSIS — R05.9 COUGH: ICD-10-CM

## 2022-01-19 DIAGNOSIS — R50.9 FEVER, UNSPECIFIED FEVER CAUSE: ICD-10-CM

## 2022-01-19 DIAGNOSIS — J02.9 SORE THROAT: ICD-10-CM

## 2022-01-19 PROCEDURE — 99214 OFFICE O/P EST MOD 30 MIN: CPT | Performed by: INTERNAL MEDICINE

## 2022-01-19 RX ORDER — AMOXICILLIN 875 MG/1
875 TABLET, COATED ORAL 2 TIMES DAILY
Qty: 20 TABLET | Refills: 0 | Status: SHIPPED | OUTPATIENT
Start: 2022-01-19 | End: 2022-01-29

## 2022-01-19 RX ORDER — METHYLPREDNISOLONE 4 MG/1
TABLET ORAL
Qty: 1 EACH | Refills: 0 | Status: SHIPPED | OUTPATIENT
Start: 2022-01-19 | End: 2022-03-31

## 2022-01-19 NOTE — PROGRESS NOTES
Subjective       Maryana Love is a 46 y.o. female.     Chief Complaint   Patient presents with   • URI       History obtained from the patient.    The patient has chosen to receive care through a Telehealth visit.  The patient consented to use a video/audio connection for his/her medical care today.    The patient presents during the COVID-19 pandemic/federally declared [facility] of public health emergency.  This service was conducted via Telehealth audio/visual.  Connected to the patient using [employer].  This visit occurred with the Provider located at [facility] (@ [place], [place]) and the patient located at home in the [facility].  Other participants in this Telehealth visit included: None.      URI   This is a new problem. The current episode started yesterday. The problem has been gradually worsening. Maximum temperature: 102. Associated symptoms include congestion, coughing, diarrhea, ear pain, headaches, joint pain, sinus pain, sneezing, a sore throat and swollen glands. Pertinent negatives include no abdominal pain, chest pain, nausea, neck pain, plugged ear sensation, rash, rhinorrhea, vomiting or wheezing. Associated symptoms comments: Denies loss of taste and smell.. She has tried acetaminophen and NSAIDs (She has also taken Benadryl sinus.) for the symptoms. The treatment provided mild relief.      There is no known exposure to COVID-19, Influenza, or Strep.  She states she does not go out much, and always wears a mask except for Pilates.  The patient is vaccinated from COVID-19 and has had a booster shot, but has not had her Influenza vaccine.  The patient states she took a home COVID test today and it was positive.      Current Outpatient Medications on File Prior to Visit   Medication Sig Dispense Refill   • acamprosate (CAMPRAL) 333 MG EC tablet Take 666 mg by mouth 2 (two) times a day.     • buPROPion XL (WELLBUTRIN XL) 150 MG 24 hr  tablet Take 450 mg by mouth Daily.     • lamoTRIgine (LaMICtal) 150 MG tablet Take 300 mg by mouth. Take 2 tablets daily     • Naltrexone (VIVITROL IM) Inject 1 dose into the appropriate muscle as directed by prescriber Every 30 (Thirty) Days.     • topiramate (TOPAMAX) 25 MG capsule (sprinkle) Take 25 mg by mouth 2 (Two) Times a Day.       No current facility-administered medications on file prior to visit.       Current outpatient and discharge medications have been reconciled for the patient.  Reviewed by: Annabelle Camargo MD        The following portions of the patient's history were reviewed and updated as appropriate: allergies, current medications, past family history, past medical history, past social history, past surgical history and problem list.    Review of Systems   Constitutional: Positive for appetite change (decreased), fatigue and fever. Negative for chills.   HENT: Positive for congestion, ear pain, sinus pressure, sinus pain, sneezing and sore throat. Negative for ear discharge, postnasal drip, rhinorrhea and voice change.    Eyes: Negative for pain, discharge, redness and itching.   Respiratory: Positive for cough and chest tightness (and hurts to breathe). Negative for shortness of breath and wheezing.    Cardiovascular: Negative for chest pain.   Gastrointestinal: Positive for diarrhea. Negative for abdominal pain, nausea and vomiting.   Musculoskeletal: Positive for arthralgias, joint pain and myalgias. Negative for neck pain and neck stiffness.   Skin: Negative for rash.   Neurological: Positive for headaches.   Hematological: Positive for adenopathy.         Objective       Temperature 99 °F (37.2 °C), temperature source Temporal, weight 60.8 kg (134 lb), not currently breastfeeding.  Body mass index is 22.3 kg/m².      Physical Exam  Vitals reviewed.   Constitutional:       Appearance: She is normal weight.   HENT:      Mouth/Throat:      Mouth: Mucous membranes are moist.      Pharynx: No  posterior oropharyngeal erythema.   Pulmonary:      Effort: Pulmonary effort is normal. No respiratory distress.      Comments: She has a wet cough.  Lymphadenopathy:      Cervical: Cervical adenopathy (enlarged, non-tender LN's per patient exam) present.   Neurological:      Mental Status: She is alert.   Psychiatric:         Mood and Affect: Mood normal.         Assessment / Plan:  Diagnoses and all orders for this visit:    1. Other acute sinusitis, recurrence not specified (Primary)  -     amoxicillin (AMOXIL) 875 MG tablet; Take 1 tablet by mouth 2 (Two) Times a Day for 10 days.  Dispense: 20 tablet; Refill: 0   Continue current over the counter medication, add Mucinex, and plenty of fluids.    May also add Afrin nasal spray for no more than 3 days.      2. Upper respiratory tract infection, unspecified type  -     methylPREDNISolone (MEDROL) 4 MG dose pack; Take as directed on package instructions.  Dispense: 1 each; Refill: 0   Continue current over the counter medication, add Mucinex, and plenty of fluids.    May also add Afrin nasal spray for no more than 3 days.    3. Fever, unspecified fever cause  -     POC Rapid Strep A; Future  -     POC Influenza A / B; Future  -     COVID-19 PCR, [facility], NP SWAB IN [facility] V[facility]AL TRANSPORT MEDIA/ORAL SWISH 24-30 HR TAT - Swab, Nasopharynx; Future    4. Cough  -     POC Influenza A / B; Future  -     COVID-19 PCR, [facility], NP SWAB IN [facility] VIRAL TRANSPORT MEDIA/ORAL SWISH 24-30 HR TAT - Swab, Nasopharynx; Future    5. Sore throat  -     POC Rapid Strep A; Future      The patient agrees to come to the office today or tomorrow morning for the lab testing.       Return in about 1 month (around 2/19/2022) for Annual physical, fasting.

## 2022-01-19 NOTE — PATIENT INSTRUCTIONS
Please come to the office today until 4 PM or tomorrow morning between 10 AM and 12 PM for lab testing.     Continue current over the counter medication, add Mucinex, and plenty of fluids. May also add Afrin nasal spray for no more than 3 days.

## 2022-01-20 ENCOUNTER — LAB (OUTPATIENT)
Dept: INTERNAL MEDICINE | Facility: CLINIC | Age: 47
End: 2022-01-20

## 2022-01-20 DIAGNOSIS — J02.9 SORE THROAT: ICD-10-CM

## 2022-01-20 DIAGNOSIS — R50.9 FEVER, UNSPECIFIED FEVER CAUSE: ICD-10-CM

## 2022-01-20 DIAGNOSIS — R05.9 COUGH: ICD-10-CM

## 2022-01-20 LAB
EXPIRATION DATE: NORMAL
EXPIRATION DATE: NORMAL
FLUAV AG NPH QL: NEGATIVE
FLUBV AG NPH QL: NEGATIVE
INTERNAL CONTROL: NORMAL
INTERNAL CONTROL: NORMAL
Lab: NORMAL
Lab: NORMAL
S PYO AG THROAT QL: NEGATIVE

## 2022-01-20 PROCEDURE — U0004 COV-19 TEST NON-CDC HGH THRU: HCPCS | Performed by: INTERNAL MEDICINE

## 2022-01-20 PROCEDURE — 87804 INFLUENZA ASSAY W/OPTIC: CPT | Performed by: INTERNAL MEDICINE

## 2022-01-20 PROCEDURE — 87880 STREP A ASSAY W/OPTIC: CPT | Performed by: INTERNAL MEDICINE

## 2022-01-21 ENCOUNTER — TELEPHONE (OUTPATIENT)
Dept: INTERNAL MEDICINE | Facility: CLINIC | Age: 47
End: 2022-01-21

## 2022-01-21 LAB — SARS-COV-2 RNA NOSE QL NAA+PROBE: DETECTED

## 2022-01-21 NOTE — TELEPHONE ENCOUNTER
Please inform the patient her COVID-19 test was positive.  Continue current medication.  Should go to the ED with any shortness of breath or respiratory distress.      Please fill out necessary health department form.

## 2022-01-21 NOTE — TELEPHONE ENCOUNTER
Pt notified, received verbal understanding.  Form faxed to [facility] via [facility]

## 2022-03-15 ENCOUNTER — TELEPHONE (OUTPATIENT)
Dept: ONCOLOGY | Facility: CLINIC | Age: 47
End: 2022-03-15

## 2022-03-15 NOTE — TELEPHONE ENCOUNTER
Caller: Maryana Love    Relationship to patient: Self    Best call back number: [phone]    Chief complaint: EXHAUSTED, LOSING WEIGHT, AND SWOLLEN LYMPH NODES    Type of visit: FOLLOW UP    Requested date: ANY ASAP    Additional notes: PLEASE CALL ONCE SCHEDULED. PATIENT WAS LAST SEEN November 2020.

## 2022-03-17 ENCOUNTER — LAB (OUTPATIENT)
Dept: LAB | Facility: HOSPITAL | Age: 47
End: 2022-03-17

## 2022-03-17 ENCOUNTER — OFFICE VISIT (OUTPATIENT)
Dept: ONCOLOGY | Facility: CLINIC | Age: 47
End: 2022-03-17

## 2022-03-17 VITALS
SYSTOLIC BLOOD PRESSURE: 108 MMHG | OXYGEN SATURATION: 99 % | BODY MASS INDEX: 21.99 KG/M2 | DIASTOLIC BLOOD PRESSURE: 67 MMHG | RESPIRATION RATE: 18 BRPM | HEART RATE: 79 BPM | TEMPERATURE: 96.9 F | HEIGHT: 65 IN | WEIGHT: 132 LBS

## 2022-03-17 DIAGNOSIS — Z17.0 MALIGNANT NEOPLASM OF UPPER-OUTER QUADRANT OF LEFT BREAST IN FEMALE, ESTROGEN RECEPTOR POSITIVE: ICD-10-CM

## 2022-03-17 DIAGNOSIS — R63.4 UNEXPLAINED WEIGHT LOSS: Primary | ICD-10-CM

## 2022-03-17 DIAGNOSIS — R63.4 UNEXPLAINED WEIGHT LOSS: ICD-10-CM

## 2022-03-17 DIAGNOSIS — C50.412 MALIGNANT NEOPLASM OF UPPER-OUTER QUADRANT OF LEFT BREAST IN FEMALE, ESTROGEN RECEPTOR POSITIVE: ICD-10-CM

## 2022-03-17 LAB
BASOPHILS # BLD AUTO: 0.11 10*3/MM3 (ref 0–0.2)
BASOPHILS NFR BLD AUTO: 1.8 % (ref 0–1.5)
DEPRECATED RDW RBC AUTO: 42.3 FL (ref 37–54)
EOSINOPHIL # BLD AUTO: 0.78 10*3/MM3 (ref 0–0.4)
EOSINOPHIL NFR BLD AUTO: 13 % (ref 0.3–6.2)
ERYTHROCYTE [DISTWIDTH] IN BLOOD BY AUTOMATED COUNT: 11.8 % (ref 12.3–15.4)
HCT VFR BLD AUTO: 41 % (ref 34–46.6)
HGB BLD-MCNC: 13.6 G/DL (ref 12–15.9)
IMM GRANULOCYTES # BLD AUTO: 0.01 10*3/MM3 (ref 0–0.05)
IMM GRANULOCYTES NFR BLD AUTO: 0.2 % (ref 0–0.5)
LYMPHOCYTES # BLD AUTO: 2.2 10*3/MM3 (ref 0.7–3.1)
LYMPHOCYTES NFR BLD AUTO: 36.8 % (ref 19.6–45.3)
MCH RBC QN AUTO: 32.5 PG (ref 26.6–33)
MCHC RBC AUTO-ENTMCNC: 33.2 G/DL (ref 31.5–35.7)
MCV RBC AUTO: 97.9 FL (ref 79–97)
MONOCYTES # BLD AUTO: 0.57 10*3/MM3 (ref 0.1–0.9)
MONOCYTES NFR BLD AUTO: 9.5 % (ref 5–12)
NEUTROPHILS NFR BLD AUTO: 2.31 10*3/MM3 (ref 1.7–7)
NEUTROPHILS NFR BLD AUTO: 38.7 % (ref 42.7–76)
NRBC BLD AUTO-RTO: 0 /100 WBC (ref 0–0.2)
PLATELET # BLD AUTO: 277 10*3/MM3 (ref 140–450)
PMV BLD AUTO: 9.7 FL (ref 6–12)
RBC # BLD AUTO: 4.19 10*6/MM3 (ref 3.77–5.28)
TSH SERPL DL<=0.05 MIU/L-ACNC: 2.05 UIU/ML (ref 0.27–4.2)
WBC NRBC COR # BLD: 5.98 10*3/MM3 (ref 3.4–10.8)

## 2022-03-17 PROCEDURE — 85025 COMPLETE CBC W/AUTO DIFF WBC: CPT

## 2022-03-17 PROCEDURE — 36415 COLL VENOUS BLD VENIPUNCTURE: CPT

## 2022-03-17 PROCEDURE — 84443 ASSAY THYROID STIM HORMONE: CPT

## 2022-03-17 PROCEDURE — 99214 OFFICE O/P EST MOD 30 MIN: CPT | Performed by: INTERNAL MEDICINE

## 2022-03-17 NOTE — PROGRESS NOTES
"PROBLEM LIST:  Oncology/Hematology History Overview Note   1. Stage IIA, ER/AK positive, HER-2 negative left breast cancer measuring  about 2.3 cm in largest dimension. Dx: 8/18/2015   2. Oncotype DX score of 17 placing her at low risk for recurrence.   3. Currently on tamoxifen initiated in 11/2015 - completed 5 years in 11.2020  4. Genetic testing was negative with the BreastNext panel.      Malignant neoplasm of upper-outer quadrant of left female breast (HCC)   8/18/2015 Initial Diagnosis    Malignant neoplasm of upper-outer quadrant of left female breast         REASON FOR VISIT: Stage II breast cancer with axillary adenopathy    HISTORY OF PRESENT ILLNESS:   46 y.o.  lady with stage II breast cancer presents today for follow-up.  She is currently 7 years out from her diagnosis. She completed 5 years of tamoxifen.  She now presents with significant weight loss and fatigue.  She is having a lot of issues doing her work due to the fatigue.    Past medical history, social history and family history was reviewed and unchanged from prior visit.    Review of Systems:    Review of Systems   Constitutional: Positive for fatigue and unexpected weight change.   HENT:  Negative.    Eyes: Negative.    Respiratory: Negative.    Cardiovascular: Negative.    Gastrointestinal: Negative.    Endocrine: Negative.    Genitourinary: Negative.     Musculoskeletal: Negative.    Skin: Negative.    Neurological: Negative.    Hematological: Negative.    Psychiatric/Behavioral: Negative.       A comprehensive 14 point review of systems was performed and was negative except as mentioned.      Medications:  The current medication list was reviewed in the EMR    ALLERGIES:    Allergies   Allergen Reactions   • Nitrofurantoin Hives   • Aripiprazole Other (See Comments)     Tremors     • Chlorhexidine Rash         Physical Exam    VITAL SIGNS:  /67   Pulse 79   Temp 96.9 °F (36.1 °C) (Temporal)   Resp 18   Ht 165.1 cm (65\")   Wt " Please ask her to come in for a sample. I can see her for an office if she feels she needs to be seen.  Okay to overbook tomorrow at 1   59.9 kg (132 lb)   LMP  (LMP Unknown) Comment: LAST MAMOGRAM 8/2015  SpO2 99%   BMI 21.97 kg/m²      Performance Status: 0    General: well appearing, in no acute distress  HEENT: sclera anicteric, oropharynx clear, neck is supple  Lymphatics:subtle small lymph node on the left  Cardiovascular: regular rate and rhythm, no murmurs, rubs or gallops  Lungs: clear to auscultation bilaterally  Abdomen: soft, nontender, nondistended.  No palpable organomegaly  Extremities: no lower extremity edema  Skin: no rashes, lesions, bruising, or petechiae  Msk:  Shows no weakness of the large muscle groups  Psych: pressured speech         RECENT LABS:    Lab Results   Component Value Date    WBC 5.71 09/21/2021    HGB 13.2 09/21/2021    HCT 41.8 09/21/2021    .2 (H) 09/21/2021     09/21/2021      Lab Results   Component Value Date    GLUCOSE 90 09/21/2021    BUN 13 09/21/2021    CREATININE 0.88 09/21/2021    EGFRIFNONA 69 09/21/2021    BCR 14.8 09/21/2021    CO2 24.0 09/21/2021    CALCIUM 9.6 09/21/2021    ALBUMIN 4.10 07/23/2021    AST 21 07/23/2021    ALT 13 07/23/2021             Assessment/Plan    1. Stage II a ER/RI positive low grade left upper quadrant breast cancer.   She is now 7 years out.  I am concerned that her weight loss may be related.  I am just going to make sure she does not have recurrence of her disease by doing a CAT scan of the chest abdomen pelvis.  We will see if her fatigue is related to his mild anemia.  I am going to check CBC today.    2.  Bipolar disorder: Better controlled for now.  Though in the past this has been a issue and could be a cause of her weight loss also.        Coleen Bautista MD  [facility] Hematology and Oncology    3/17/2022

## 2022-03-31 ENCOUNTER — HOSPITAL ENCOUNTER (OUTPATIENT)
Dept: CT IMAGING | Facility: HOSPITAL | Age: 47
Discharge: HOME | End: 2022-03-31
Admitting: INTERNAL MEDICINE

## 2022-03-31 ENCOUNTER — OFFICE VISIT (OUTPATIENT)
Dept: ONCOLOGY | Facility: CLINIC | Age: 47
End: 2022-03-31

## 2022-03-31 VITALS
RESPIRATION RATE: 16 BRPM | HEIGHT: 65 IN | OXYGEN SATURATION: 99 % | HEART RATE: 67 BPM | WEIGHT: 132.2 LBS | TEMPERATURE: 96.7 F | BODY MASS INDEX: 22.02 KG/M2 | DIASTOLIC BLOOD PRESSURE: 80 MMHG | SYSTOLIC BLOOD PRESSURE: 120 MMHG

## 2022-03-31 DIAGNOSIS — R63.4 UNEXPLAINED WEIGHT LOSS: ICD-10-CM

## 2022-03-31 DIAGNOSIS — Z17.1 MALIGNANT NEOPLASM OF UPPER-OUTER QUADRANT OF LEFT BREAST IN FEMALE, ESTROGEN RECEPTOR NEGATIVE: Primary | ICD-10-CM

## 2022-03-31 DIAGNOSIS — C50.412 MALIGNANT NEOPLASM OF UPPER-OUTER QUADRANT OF LEFT BREAST IN FEMALE, ESTROGEN RECEPTOR NEGATIVE: Primary | ICD-10-CM

## 2022-03-31 PROCEDURE — 99213 OFFICE O/P EST LOW 20 MIN: CPT | Performed by: INTERNAL MEDICINE

## 2022-03-31 PROCEDURE — 25010000002 IOPAMIDOL 61 % SOLUTION: Performed by: INTERNAL MEDICINE

## 2022-03-31 PROCEDURE — 71260 CT THORAX DX C+: CPT

## 2022-03-31 PROCEDURE — 74177 CT ABD & PELVIS W/CONTRAST: CPT

## 2022-03-31 RX ADMIN — IOPAMIDOL 85 ML: 612 INJECTION, SOLUTION INTRAVENOUS at 12:04

## 2022-03-31 NOTE — PROGRESS NOTES
"PROBLEM LIST:  Oncology/Hematology History Overview Note   1. Stage IIA, ER/ND positive, HER-2 negative left breast cancer measuring  about 2.3 cm in largest dimension. Dx: 8/18/2015   2. Oncotype DX score of 17 placing her at low risk for recurrence.   3. Currently on tamoxifen initiated in 11/2015 - completed 5 years in 11.2020  4. Genetic testing was negative with the BreastNext panel.      Malignant neoplasm of upper-outer quadrant of left female breast (HCC)   8/18/2015 Initial Diagnosis    Malignant neoplasm of upper-outer quadrant of left female breast         REASON FOR VISIT: Stage II breast cancer with axillary adenopathy    HISTORY OF PRESENT ILLNESS:   46 y.o.  lady with stage II breast cancer presents today for follow-up.  She is currently 7 years out from her diagnosis. She completed 5 years of tamoxifen.  She continues to complain of fatigue and weight loss.  She presents after having CAT scans done.    Past medical history, social history and family history was reviewed and unchanged from prior visit.    Review of Systems:    Review of Systems   Constitutional: Positive for fatigue and unexpected weight change.   HENT:  Negative.    Eyes: Negative.    Respiratory: Negative.    Cardiovascular: Negative.    Gastrointestinal: Negative.    Endocrine: Negative.    Genitourinary: Negative.     Musculoskeletal: Negative.    Skin: Negative.    Neurological: Negative.    Hematological: Negative.    Psychiatric/Behavioral: Negative.       A comprehensive 14 point review of systems was performed and was negative except as mentioned.      Medications:  The current medication list was reviewed in the EMR    ALLERGIES:    Allergies   Allergen Reactions   • Nitrofurantoin Hives   • Aripiprazole Other (See Comments)     Tremors     • Chlorhexidine Rash         Physical Exam    VITAL SIGNS:  /80   Pulse 67   Temp 96.7 °F (35.9 °C) (Temporal)   Resp 16   Ht 165.1 cm (65\")   Wt 60 kg (132 lb 3.2 oz)   LMP  " (LMP Unknown) Comment: LAST MAMOGRAM 8/2015  SpO2 99%   BMI 22.00 kg/m²      Performance Status: 0    General: well appearing, in no acute distress  HEENT: sclera anicteric, oropharynx clear, neck is supple  Lymphatics:subtle small lymph node on the left  Cardiovascular: regular rate and rhythm, no murmurs, rubs or gallops  Lungs: clear to auscultation bilaterally  Abdomen: soft, nontender, nondistended.  No palpable organomegaly  Extremities: no lower extremity edema  Skin: no rashes, lesions, bruising, or petechiae  Msk:  Shows no weakness of the large muscle groups  Psych: pressured speech         RECENT LABS:    Lab Results   Component Value Date    WBC 5.98 03/17/2022    HGB 13.6 03/17/2022    HCT 41.0 03/17/2022    MCV 97.9 (H) 03/17/2022     03/17/2022      Lab Results   Component Value Date    GLUCOSE 90 09/21/2021    BUN 13 09/21/2021    CREATININE 0.88 09/21/2021    EGFRIFNONA 69 09/21/2021    BCR 14.8 09/21/2021    CO2 24.0 09/21/2021    CALCIUM 9.6 09/21/2021    ALBUMIN 4.10 07/23/2021    AST 21 07/23/2021    ALT 13 07/23/2021       CT Chest With Contrast Diagnostic    Result Date: 3/31/2022  No evidence of recurrent malignancy or other active chest disease.    ABDOMEN AND PELVIS CT SCAN WITH IV CONTRAST: Hyperdense, approximately 2.8 cm right lobe liver lesion is again unchanged, presumably liver hemangioma. A few small left lobe liver cysts are again noted. No clearly new liver mass is appreciated. There is diffuse fatty liver change.  Spleen is not enlarged. No significant abnormalities are seen of the pancreas, adrenal glands, gallbladder, or kidneys. No upper abdominal free air, ascites, adenopathy or acute inflammatory focus is appreciated. There are expected changes of previous gastric sleeve surgery.  Regarding the lower abdomen and pelvis, bowel loops are normal in caliber. There appears to be mild fecal stasis and no evidence of impaction or obstruction. Bladder is normally distended.  Uterus appears to be surgically absent. Left ovary is not definitely identified, but is apparently not enlarged. The right ovary may still be present, with a small cyst seen on portal venous phase image 97, delayed venous phase image 97 as well, with the presumed ovary approximately 3 cm in diameter, and the cystic area approximately 14 mm in diameter. On the 2018 scan, a similar lesion was present with a larger, 2 cm cyst. No clearly new intrapelvic mass, cyst or inflammatory change is appreciated. Bony structures appear to be intact. Delayed venous phase images show no evidence of obstructive uropathy.  IMPRESSION:  1. Stable liver hemangioma and small hepatic cysts. 2. Persistent or recurrent right adnexal cyst, compared to 2018 exam, but smaller today, approximately 2 cm. 3. Mild fecal stasis. 4. No significant new or progressive intra-abdominal or intrapelvic disease is seen. No evidence to suggest recurrent malignancy.      CT Abdomen Pelvis With Contrast    Result Date: 3/31/2022  No evidence of recurrent malignancy or other active chest disease.    ABDOMEN AND PELVIS CT SCAN WITH IV CONTRAST: Hyperdense, approximately 2.8 cm right lobe liver lesion is again unchanged, presumably liver hemangioma. A few small left lobe liver cysts are again noted. No clearly new liver mass is appreciated. There is diffuse fatty liver change.  Spleen is not enlarged. No significant abnormalities are seen of the pancreas, adrenal glands, gallbladder, or kidneys. No upper abdominal free air, ascites, adenopathy or acute inflammatory focus is appreciated. There are expected changes of previous gastric sleeve surgery.  Regarding the lower abdomen and pelvis, bowel loops are normal in caliber. There appears to be mild fecal stasis and no evidence of impaction or obstruction. Bladder is normally distended. Uterus appears to be surgically absent. Left ovary is not definitely identified, but is apparently not enlarged. The right ovary  may still be present, with a small cyst seen on portal venous phase image 97, delayed venous phase image 97 as well, with the presumed ovary approximately 3 cm in diameter, and the cystic area approximately 14 mm in diameter. On the 2018 scan, a similar lesion was present with a larger, 2 cm cyst. No clearly new intrapelvic mass, cyst or inflammatory change is appreciated. Bony structures appear to be intact. Delayed venous phase images show no evidence of obstructive uropathy.  IMPRESSION:  1. Stable liver hemangioma and small hepatic cysts. 2. Persistent or recurrent right adnexal cyst, compared to 2018 exam, but smaller today, approximately 2 cm. 3. Mild fecal stasis. 4. No significant new or progressive intra-abdominal or intrapelvic disease is seen. No evidence to suggest recurrent malignancy.            Assessment/Plan    1. Stage II a ER/NM positive low grade left upper quadrant breast cancer.   I reviewed her scans with her and her  today.  She does not have any sign of recurrence.  For weight loss to occur in malignancy, there has to be a significant disease volume.  In this situation I do not see anything that would be suggestive of malignancy.  This is not the cause of her weight loss and fatigue.      2.  Bipolar disorder: Better controlled for now.  Though in the past this has been a issue and could be a cause of her weight loss also.    I will see her on a as needed basis.  She will follow up with her primary care.    Coleen Bautista MD  [facility] Hematology and Oncology    3/31/2022

## 2022-05-03 ENCOUNTER — PRE-ADMISSION TESTING (OUTPATIENT)
Dept: PREADMISSION TESTING | Facility: HOSPITAL | Age: 47
End: 2022-05-03

## 2022-05-03 VITALS — HEIGHT: 65 IN | WEIGHT: 136.35 LBS | BODY MASS INDEX: 22.72 KG/M2

## 2022-05-03 LAB
ANION GAP SERPL CALCULATED.3IONS-SCNC: 10 MMOL/L (ref 5–15)
BUN SERPL-MCNC: 11 MG/DL (ref 6–20)
BUN/CREAT SERPL: 12.2 (ref 7–25)
CALCIUM SPEC-SCNC: 9.4 MG/DL (ref 8.6–10.5)
CHLORIDE SERPL-SCNC: 108 MMOL/L (ref 98–107)
CO2 SERPL-SCNC: 24 MMOL/L (ref 22–29)
CREAT SERPL-MCNC: 0.9 MG/DL (ref 0.57–1)
DEPRECATED RDW RBC AUTO: 45.4 FL (ref 37–54)
EGFRCR SERPLBLD CKD-EPI 2021: 80 ML/MIN/1.73
ERYTHROCYTE [DISTWIDTH] IN BLOOD BY AUTOMATED COUNT: 12.5 % (ref 12.3–15.4)
GLUCOSE SERPL-MCNC: 145 MG/DL (ref 65–99)
HCT VFR BLD AUTO: 40.5 % (ref 34–46.6)
HGB BLD-MCNC: 13.2 G/DL (ref 12–15.9)
MCH RBC QN AUTO: 32 PG (ref 26.6–33)
MCHC RBC AUTO-ENTMCNC: 32.6 G/DL (ref 31.5–35.7)
MCV RBC AUTO: 98.3 FL (ref 79–97)
PLATELET # BLD AUTO: 299 10*3/MM3 (ref 140–450)
PMV BLD AUTO: 9.5 FL (ref 6–12)
POTASSIUM SERPL-SCNC: 3.8 MMOL/L (ref 3.5–5.2)
RBC # BLD AUTO: 4.12 10*6/MM3 (ref 3.77–5.28)
SARS-COV-2 RNA PNL SPEC NAA+PROBE: NOT DETECTED
SODIUM SERPL-SCNC: 142 MMOL/L (ref 136–145)
WBC NRBC COR # BLD: 5.11 10*3/MM3 (ref 3.4–10.8)

## 2022-05-03 PROCEDURE — 80048 BASIC METABOLIC PNL TOTAL CA: CPT

## 2022-05-03 PROCEDURE — 85027 COMPLETE CBC AUTOMATED: CPT

## 2022-05-03 PROCEDURE — U0004 COV-19 TEST NON-CDC HGH THRU: HCPCS

## 2022-05-03 PROCEDURE — 36415 COLL VENOUS BLD VENIPUNCTURE: CPT

## 2022-05-03 PROCEDURE — C9803 HOPD COVID-19 SPEC COLLECT: HCPCS

## 2022-05-03 RX ORDER — NALTREXONE HYDROCHLORIDE 50 MG/1
100 TABLET, FILM COATED ORAL DAILY
COMMUNITY

## 2022-05-03 RX ORDER — OLANZAPINE 5 MG/1
5 TABLET ORAL NIGHTLY
COMMUNITY
End: 2023-03-09

## 2022-05-03 NOTE — DISCHARGE INSTRUCTIONS
Patient is unable to use the CHG wipes due to allergy. LUXE ready bath antibacterial wipes provided , with instruction for use the night before and the morning of surgery.    The following information and instructions were given:    Do not eat, drink, smoke or chew gum after midnight the night before surgery. This includes no mints.  Take all routine, prescribed medications including heart and blood pressure medicines with a sip of water unless otherwise instructed by your physician.   Do NOT take diabetic medication unless instructed by your physician.    DO NOT shave for two days before your procedure.  Do not wear makeup.      DO NOT wear fingernail polish (gel/regular) and/or acrylic/artificial nails on the day of surgery.   If you had a recent manicure and would rather not remove polish or artificial nails, the minimum requirement is that the polish/artificial nails must be removed from the middle finger on each hand.      If you are having surgery/procedure on an upper extremity, fingernail polish/artificial fingernails must be removed for surgery.  NO EXCEPTIONS.      If you are having surgery/procedure on a lower extremity, toenail polish on both extremities must be removed for surgery.  NO EXCEPTIONS.    Remove all jewelry (advise to go to jeweler if unable to remove).  Jewelry, especially rings, can no longer be taped for surgery.    Leave anything you consider valuable at home.    Leave your suitcase in the car until after your surgery.    Bring the following with you the day of your procedure (when applicable):       -Picture ID and insurance cards       -Co-pay/deductible required by insurance       -Medications in the original bottles (not a list) including all over-the-counter medications if not brought to PAT       -Copy of advance directive, living will or power of  documents if not brought to PAT       -Skin prep instructions with Luxe Ready bath antibacterial wipes        -PAT  Pass    Education booklet (when applicable), brochure, handout or binder related to procedure given to patient.  Education booklet also includes general information related to their recovery that mentions signs and symptoms of infection and when to call the doctor.    Respirex use and pneumonia prevention education provided in Pre Admission Testing general education video.    Signs and Symptoms of infection discussed with patient in Pre Admission Testing. Information related to infection and hand hygiene mentioned in Pre Admission Testing general education video. Patient instructed to call their doctor if any of the following symptoms are noted during recovery:  Fever of 100.4 F or higher, incision that is warm or has increasing bleeding, redness or drainage.    DVT Prevention instructions given in general education video presentation during Pre Admission Testing appointment that stress the importance of ambulation to improve blood circulation.  Also encouraged patient to perform foot exercises when in bed and application of a sequential device may be applied to lower extremities to improve circulation.

## 2022-05-03 NOTE — PAT
Pt requested Anesthesia give instructions for naltrexone. Dr Hicks gave instructions to hold until surgery ( Last dose was 5/2/22)    Patient questioned if she was to drink Gatorade the morning of surgery. No ERAS orders received for this encounter. Message left with Dr Culver surgery scheduler to contact patient if Dr Culver wants ERAS and gatorade.    Allergic to CHG so Luxe readybath antibacterial wipes provided for use     Patient denies any current skin issues. .

## 2022-05-05 ENCOUNTER — ANESTHESIA (OUTPATIENT)
Dept: PERIOP | Facility: HOSPITAL | Age: 47
End: 2022-05-05

## 2022-05-05 ENCOUNTER — ANESTHESIA EVENT (OUTPATIENT)
Dept: PERIOP | Facility: HOSPITAL | Age: 47
End: 2022-05-05

## 2022-05-05 ENCOUNTER — HOSPITAL ENCOUNTER (OUTPATIENT)
Facility: HOSPITAL | Age: 47
Setting detail: HOSPITAL OUTPATIENT SURGERY
Discharge: HOME | End: 2022-05-05
Attending: PLASTIC SURGERY | Admitting: PLASTIC SURGERY

## 2022-05-05 ENCOUNTER — ANESTHESIA EVENT CONVERTED (OUTPATIENT)
Dept: ANESTHESIOLOGY | Facility: HOSPITAL | Age: 47
End: 2022-05-05

## 2022-05-05 VITALS
SYSTOLIC BLOOD PRESSURE: 123 MMHG | BODY MASS INDEX: 22.66 KG/M2 | HEIGHT: 65 IN | RESPIRATION RATE: 17 BRPM | OXYGEN SATURATION: 100 % | TEMPERATURE: 97.9 F | DIASTOLIC BLOOD PRESSURE: 83 MMHG | WEIGHT: 136 LBS | HEART RATE: 74 BPM

## 2022-05-05 DIAGNOSIS — Z85.3 HISTORY OF BREAST CANCER: ICD-10-CM

## 2022-05-05 PROCEDURE — 25010000002 CEFAZOLIN IN DEXTROSE 2-4 GM/100ML-% SOLUTION: Performed by: PLASTIC SURGERY

## 2022-05-05 PROCEDURE — 25010000002 DEXAMETHASONE SODIUM PHOSPHATE 10 MG/ML SOLUTION: Performed by: NURSE ANESTHETIST, CERTIFIED REGISTERED

## 2022-05-05 PROCEDURE — 25010000002 CEFAZOLIN PER 500 MG: Performed by: PLASTIC SURGERY

## 2022-05-05 PROCEDURE — 25010000002 DEXAMETHASONE PER 1 MG: Performed by: NURSE ANESTHETIST, CERTIFIED REGISTERED

## 2022-05-05 PROCEDURE — 25010000002 VANCOMYCIN 1 G RECONSTITUTED SOLUTION 1 EACH VIAL: Performed by: PLASTIC SURGERY

## 2022-05-05 PROCEDURE — 25010000002 GENTAMICIN PER 80 MG: Performed by: PLASTIC SURGERY

## 2022-05-05 PROCEDURE — C1789 PROSTHESIS, BREAST, IMP: HCPCS | Performed by: PLASTIC SURGERY

## 2022-05-05 PROCEDURE — 25010000002 PROPOFOL 10 MG/ML EMULSION: Performed by: NURSE ANESTHETIST, CERTIFIED REGISTERED

## 2022-05-05 PROCEDURE — 25010000002 MIDAZOLAM PER 1 MG: Performed by: ANESTHESIOLOGY

## 2022-05-05 PROCEDURE — C1713 ANCHOR/SCREW BN/BN,TIS/BN: HCPCS | Performed by: PLASTIC SURGERY

## 2022-05-05 PROCEDURE — 80305 DRUG TEST PRSMV DIR OPT OBS: CPT | Performed by: PLASTIC SURGERY

## 2022-05-05 PROCEDURE — 25010000002 HYDROMORPHONE 1 MG/ML SOLUTION

## 2022-05-05 PROCEDURE — 25010000002 FENTANYL CITRATE (PF) 50 MCG/ML SOLUTION: Performed by: NURSE ANESTHETIST, CERTIFIED REGISTERED

## 2022-05-05 PROCEDURE — 25010000002 ONDANSETRON PER 1 MG: Performed by: NURSE ANESTHETIST, CERTIFIED REGISTERED

## 2022-05-05 DEVICE — GRFT TISS ALLODERM RTM PERF 16X20CM 2.4MM/THK .4MM: Type: IMPLANTABLE DEVICE | Site: BREAST | Status: FUNCTIONAL

## 2022-05-05 DEVICE — EXPNDR TISS BRST F/HT V/P STL 133S FV/T 400CC: Type: IMPLANTABLE DEVICE | Site: BREAST | Status: FUNCTIONAL

## 2022-05-05 DEVICE — STIMULAN® RAPID CURE PROVIDED STERILE FOR SINGLE PATIENT USE. STIMULAN® RAPID CURE CONTAINS CALCIUM SULFATE POWDER AND MIXING SOLUTION IN PRE-MEASURED QUANTITIES SO THAT WHEN MIXED TOGETHER IN A STERILE MIXING BOWL, THE RESULTANT PASTE IS TO BE DIGITALLY PACKED INTO OPEN BONE VOID/GAP TO SET INSITU OR PLACED INTO THE MOULD PROVIDED, THE MIXTURE SETS TO FORM BEADS. THE BIODEGRADABLE, RADIOPAQUE BEADS ARE RESORBED IN APPROXIMATELY 30 – 60 DAYS WHEN USED IN ACCORDANCE WITH THE DEVICE LABELLING. STIMULAN® RAPID CURE IS MANUFACTURED FROM SYNTHETIC IMPLANT GRADE CALCIUM SULFATE DIHYDRATE(CASO4.2H2O) THAT RESORBS AND IS REPLACED WITH BONE DURING THE HEALING PROCESS. ALSO, AS THE BONE VOID FILLER BEADS ARE BIODEGRADABLE AND BIOCOMPATIBLE, THEY MAY BE USED AT AN INFECTED SITE.
Type: IMPLANTABLE DEVICE | Site: BREAST | Status: FUNCTIONAL
Brand: STIMULAN® RAPID CURE

## 2022-05-05 DEVICE — DEV CONTRL TISS STRATAFIX SPIRAL MNCRYL UD 3/0 PLS 30CM: Type: IMPLANTABLE DEVICE | Site: BREAST | Status: FUNCTIONAL

## 2022-05-05 RX ORDER — IPRATROPIUM BROMIDE AND ALBUTEROL SULFATE 2.5; .5 MG/3ML; MG/3ML
3 SOLUTION RESPIRATORY (INHALATION) ONCE AS NEEDED
Status: DISCONTINUED | OUTPATIENT
Start: 2022-05-05 | End: 2022-05-05 | Stop reason: HOSPADM

## 2022-05-05 RX ORDER — MAGNESIUM HYDROXIDE 1200 MG/15ML
LIQUID ORAL AS NEEDED
Status: DISCONTINUED | OUTPATIENT
Start: 2022-05-05 | End: 2022-05-05 | Stop reason: HOSPADM

## 2022-05-05 RX ORDER — PROMETHAZINE HYDROCHLORIDE 25 MG/1
25 SUPPOSITORY RECTAL ONCE AS NEEDED
Status: DISCONTINUED | OUTPATIENT
Start: 2022-05-05 | End: 2022-05-05 | Stop reason: HOSPADM

## 2022-05-05 RX ORDER — LIDOCAINE HYDROCHLORIDE 10 MG/ML
INJECTION, SOLUTION EPIDURAL; INFILTRATION; INTRACAUDAL; PERINEURAL AS NEEDED
Status: DISCONTINUED | OUTPATIENT
Start: 2022-05-05 | End: 2022-05-05 | Stop reason: SURG

## 2022-05-05 RX ORDER — PROMETHAZINE HYDROCHLORIDE 25 MG/1
25 TABLET ORAL ONCE AS NEEDED
Status: DISCONTINUED | OUTPATIENT
Start: 2022-05-05 | End: 2022-05-05 | Stop reason: HOSPADM

## 2022-05-05 RX ORDER — BUPIVACAINE HYDROCHLORIDE 2.5 MG/ML
INJECTION, SOLUTION EPIDURAL; INFILTRATION; INTRACAUDAL
Status: COMPLETED | OUTPATIENT
Start: 2022-05-05 | End: 2022-05-05

## 2022-05-05 RX ORDER — SODIUM CHLORIDE 0.9 % (FLUSH) 0.9 %
3-10 SYRINGE (ML) INJECTION AS NEEDED
Status: DISCONTINUED | OUTPATIENT
Start: 2022-05-05 | End: 2022-05-05 | Stop reason: HOSPADM

## 2022-05-05 RX ORDER — MIDAZOLAM HYDROCHLORIDE 1 MG/ML
1 INJECTION INTRAMUSCULAR; INTRAVENOUS
Status: DISCONTINUED | OUTPATIENT
Start: 2022-05-05 | End: 2022-05-05 | Stop reason: HOSPADM

## 2022-05-05 RX ORDER — LIDOCAINE HYDROCHLORIDE AND EPINEPHRINE 10; 10 MG/ML; UG/ML
INJECTION, SOLUTION INFILTRATION; PERINEURAL AS NEEDED
Status: DISCONTINUED | OUTPATIENT
Start: 2022-05-05 | End: 2022-05-05 | Stop reason: HOSPADM

## 2022-05-05 RX ORDER — SODIUM CHLORIDE 0.9 % (FLUSH) 0.9 %
3 SYRINGE (ML) INJECTION EVERY 12 HOURS SCHEDULED
Status: DISCONTINUED | OUTPATIENT
Start: 2022-05-05 | End: 2022-05-05 | Stop reason: HOSPADM

## 2022-05-05 RX ORDER — BUPIVACAINE HCL/0.9 % NACL/PF 0.125 %
PLASTIC BAG, INJECTION (ML) EPIDURAL AS NEEDED
Status: DISCONTINUED | OUTPATIENT
Start: 2022-05-05 | End: 2022-05-05 | Stop reason: SURG

## 2022-05-05 RX ORDER — LABETALOL HYDROCHLORIDE 5 MG/ML
5 INJECTION, SOLUTION INTRAVENOUS
Status: DISCONTINUED | OUTPATIENT
Start: 2022-05-05 | End: 2022-05-05 | Stop reason: HOSPADM

## 2022-05-05 RX ORDER — PROPOFOL 10 MG/ML
VIAL (ML) INTRAVENOUS AS NEEDED
Status: DISCONTINUED | OUTPATIENT
Start: 2022-05-05 | End: 2022-05-05

## 2022-05-05 RX ORDER — DEXAMETHASONE SODIUM PHOSPHATE 10 MG/ML
INJECTION, SOLUTION INTRAMUSCULAR; INTRAVENOUS
Status: COMPLETED | OUTPATIENT
Start: 2022-05-05 | End: 2022-05-05

## 2022-05-05 RX ORDER — LIDOCAINE HYDROCHLORIDE 10 MG/ML
INJECTION, SOLUTION EPIDURAL; INFILTRATION; INTRACAUDAL; PERINEURAL AS NEEDED
Status: DISCONTINUED | OUTPATIENT
Start: 2022-05-05 | End: 2022-05-05

## 2022-05-05 RX ORDER — CEFAZOLIN SODIUM 2 G/100ML
2 INJECTION, SOLUTION INTRAVENOUS ONCE
Status: COMPLETED | OUTPATIENT
Start: 2022-05-05 | End: 2022-05-05

## 2022-05-05 RX ORDER — SODIUM CHLORIDE, SODIUM LACTATE, POTASSIUM CHLORIDE, CALCIUM CHLORIDE 600; 310; 30; 20 MG/100ML; MG/100ML; MG/100ML; MG/100ML
9 INJECTION, SOLUTION INTRAVENOUS CONTINUOUS PRN
Status: DISCONTINUED | OUTPATIENT
Start: 2022-05-05 | End: 2022-05-05 | Stop reason: HOSPADM

## 2022-05-05 RX ORDER — LIDOCAINE HYDROCHLORIDE 10 MG/ML
0.5 INJECTION, SOLUTION EPIDURAL; INFILTRATION; INTRACAUDAL; PERINEURAL ONCE AS NEEDED
Status: COMPLETED | OUTPATIENT
Start: 2022-05-05 | End: 2022-05-05

## 2022-05-05 RX ORDER — ROCURONIUM BROMIDE 10 MG/ML
INJECTION, SOLUTION INTRAVENOUS AS NEEDED
Status: DISCONTINUED | OUTPATIENT
Start: 2022-05-05 | End: 2022-05-05 | Stop reason: SURG

## 2022-05-05 RX ORDER — SODIUM CHLORIDE 0.9 % (FLUSH) 0.9 %
10 SYRINGE (ML) INJECTION EVERY 12 HOURS SCHEDULED
Status: CANCELLED | OUTPATIENT
Start: 2022-05-05

## 2022-05-05 RX ORDER — FENTANYL CITRATE 50 UG/ML
INJECTION, SOLUTION INTRAMUSCULAR; INTRAVENOUS AS NEEDED
Status: DISCONTINUED | OUTPATIENT
Start: 2022-05-05 | End: 2022-05-05 | Stop reason: SURG

## 2022-05-05 RX ORDER — ONDANSETRON 2 MG/ML
INJECTION INTRAMUSCULAR; INTRAVENOUS AS NEEDED
Status: DISCONTINUED | OUTPATIENT
Start: 2022-05-05 | End: 2022-05-05 | Stop reason: SURG

## 2022-05-05 RX ORDER — DROPERIDOL 2.5 MG/ML
0.62 INJECTION, SOLUTION INTRAMUSCULAR; INTRAVENOUS AS NEEDED
Status: DISCONTINUED | OUTPATIENT
Start: 2022-05-05 | End: 2022-05-05 | Stop reason: HOSPADM

## 2022-05-05 RX ORDER — FENTANYL CITRATE 50 UG/ML
50 INJECTION, SOLUTION INTRAMUSCULAR; INTRAVENOUS
Status: DISCONTINUED | OUTPATIENT
Start: 2022-05-05 | End: 2022-05-05 | Stop reason: HOSPADM

## 2022-05-05 RX ORDER — BACITRACIN ZINC AND POLYMYXIN B SULFATE 500; 10000 [USP'U]/G; [USP'U]/G
OINTMENT TOPICAL AS NEEDED
Status: DISCONTINUED | OUTPATIENT
Start: 2022-05-05 | End: 2022-05-05 | Stop reason: HOSPADM

## 2022-05-05 RX ORDER — HYDRALAZINE HYDROCHLORIDE 20 MG/ML
5 INJECTION INTRAMUSCULAR; INTRAVENOUS
Status: DISCONTINUED | OUTPATIENT
Start: 2022-05-05 | End: 2022-05-05 | Stop reason: HOSPADM

## 2022-05-05 RX ORDER — NALOXONE HCL 0.4 MG/ML
0.4 VIAL (ML) INJECTION AS NEEDED
Status: DISCONTINUED | OUTPATIENT
Start: 2022-05-05 | End: 2022-05-05 | Stop reason: HOSPADM

## 2022-05-05 RX ORDER — SODIUM CHLORIDE 9 MG/ML
INJECTION, SOLUTION INTRAVENOUS AS NEEDED
Status: DISCONTINUED | OUTPATIENT
Start: 2022-05-05 | End: 2022-05-05 | Stop reason: HOSPADM

## 2022-05-05 RX ORDER — PROPOFOL 10 MG/ML
VIAL (ML) INTRAVENOUS AS NEEDED
Status: DISCONTINUED | OUTPATIENT
Start: 2022-05-05 | End: 2022-05-05 | Stop reason: SURG

## 2022-05-05 RX ORDER — FAMOTIDINE 20 MG/1
20 TABLET, FILM COATED ORAL
Status: COMPLETED | OUTPATIENT
Start: 2022-05-05 | End: 2022-05-05

## 2022-05-05 RX ORDER — HYDROMORPHONE HYDROCHLORIDE 1 MG/ML
0.5 INJECTION, SOLUTION INTRAMUSCULAR; INTRAVENOUS; SUBCUTANEOUS
Status: DISCONTINUED | OUTPATIENT
Start: 2022-05-05 | End: 2022-05-05 | Stop reason: HOSPADM

## 2022-05-05 RX ORDER — SODIUM CHLORIDE 0.9 % (FLUSH) 0.9 %
10 SYRINGE (ML) INJECTION AS NEEDED
Status: CANCELLED | OUTPATIENT
Start: 2022-05-05

## 2022-05-05 RX ORDER — MIDAZOLAM HYDROCHLORIDE 1 MG/ML
2 INJECTION INTRAMUSCULAR; INTRAVENOUS ONCE
Status: COMPLETED | OUTPATIENT
Start: 2022-05-05 | End: 2022-05-05

## 2022-05-05 RX ORDER — MEPERIDINE HYDROCHLORIDE 25 MG/ML
12.5 INJECTION INTRAMUSCULAR; INTRAVENOUS; SUBCUTANEOUS
Status: DISCONTINUED | OUTPATIENT
Start: 2022-05-05 | End: 2022-05-05 | Stop reason: HOSPADM

## 2022-05-05 RX ORDER — ONDANSETRON 2 MG/ML
4 INJECTION INTRAMUSCULAR; INTRAVENOUS ONCE AS NEEDED
Status: DISCONTINUED | OUTPATIENT
Start: 2022-05-05 | End: 2022-05-05 | Stop reason: HOSPADM

## 2022-05-05 RX ORDER — HYDROCODONE BITARTRATE AND ACETAMINOPHEN 5; 325 MG/1; MG/1
1 TABLET ORAL ONCE AS NEEDED
Status: DISCONTINUED | OUTPATIENT
Start: 2022-05-05 | End: 2022-05-05 | Stop reason: HOSPADM

## 2022-05-05 RX ORDER — DROPERIDOL 2.5 MG/ML
0.62 INJECTION, SOLUTION INTRAMUSCULAR; INTRAVENOUS ONCE AS NEEDED
Status: DISCONTINUED | OUTPATIENT
Start: 2022-05-05 | End: 2022-05-05 | Stop reason: HOSPADM

## 2022-05-05 RX ORDER — DEXAMETHASONE SODIUM PHOSPHATE 4 MG/ML
INJECTION, SOLUTION INTRA-ARTICULAR; INTRALESIONAL; INTRAMUSCULAR; INTRAVENOUS; SOFT TISSUE AS NEEDED
Status: DISCONTINUED | OUTPATIENT
Start: 2022-05-05 | End: 2022-05-05 | Stop reason: SURG

## 2022-05-05 RX ADMIN — MIDAZOLAM HYDROCHLORIDE 2 MG: 1 INJECTION, SOLUTION INTRAMUSCULAR; INTRAVENOUS at 11:31

## 2022-05-05 RX ADMIN — LIDOCAINE HYDROCHLORIDE 0.2 ML: 10 INJECTION, SOLUTION EPIDURAL; INFILTRATION; INTRACAUDAL; PERINEURAL at 09:35

## 2022-05-05 RX ADMIN — DEXAMETHASONE SODIUM PHOSPHATE 2 MG: 10 INJECTION, SOLUTION INTRAMUSCULAR; INTRAVENOUS at 11:30

## 2022-05-05 RX ADMIN — HYDROMORPHONE HYDROCHLORIDE 0.5 MG: 1 INJECTION, SOLUTION INTRAMUSCULAR; INTRAVENOUS; SUBCUTANEOUS at 14:11

## 2022-05-05 RX ADMIN — FENTANYL CITRATE 50 MCG: 50 INJECTION, SOLUTION INTRAMUSCULAR; INTRAVENOUS at 13:32

## 2022-05-05 RX ADMIN — FENTANYL CITRATE 100 MCG: 50 INJECTION, SOLUTION INTRAMUSCULAR; INTRAVENOUS at 13:56

## 2022-05-05 RX ADMIN — FENTANYL CITRATE 100 MCG: 50 INJECTION, SOLUTION INTRAMUSCULAR; INTRAVENOUS at 11:31

## 2022-05-05 RX ADMIN — LIDOCAINE HYDROCHLORIDE 50 MG: 10 INJECTION, SOLUTION EPIDURAL; INFILTRATION; INTRACAUDAL; PERINEURAL at 11:31

## 2022-05-05 RX ADMIN — CEFAZOLIN SODIUM 2 G: 2 INJECTION, SOLUTION INTRAVENOUS at 11:38

## 2022-05-05 RX ADMIN — ROCURONIUM BROMIDE 50 MG: 10 INJECTION, SOLUTION INTRAVENOUS at 11:31

## 2022-05-05 RX ADMIN — DEXAMETHASONE SODIUM PHOSPHATE 8 MG: 4 INJECTION, SOLUTION INTRA-ARTICULAR; INTRALESIONAL; INTRAMUSCULAR; INTRAVENOUS; SOFT TISSUE at 12:35

## 2022-05-05 RX ADMIN — SODIUM CHLORIDE, POTASSIUM CHLORIDE, SODIUM LACTATE AND CALCIUM CHLORIDE 9 ML/HR: 600; 310; 30; 20 INJECTION, SOLUTION INTRAVENOUS at 09:35

## 2022-05-05 RX ADMIN — ROCURONIUM BROMIDE 10 MG: 10 INJECTION, SOLUTION INTRAVENOUS at 12:22

## 2022-05-05 RX ADMIN — SUGAMMADEX 200 MG: 100 INJECTION, SOLUTION INTRAVENOUS at 13:28

## 2022-05-05 RX ADMIN — FAMOTIDINE 20 MG: 20 TABLET ORAL at 09:53

## 2022-05-05 RX ADMIN — Medication 100 MCG: at 12:13

## 2022-05-05 RX ADMIN — SODIUM CHLORIDE, POTASSIUM CHLORIDE, SODIUM LACTATE AND CALCIUM CHLORIDE: 600; 310; 30; 20 INJECTION, SOLUTION INTRAVENOUS at 12:58

## 2022-05-05 RX ADMIN — PROPOFOL 50 MCG/KG/MIN: 10 INJECTION, EMULSION INTRAVENOUS at 11:33

## 2022-05-05 RX ADMIN — ROCURONIUM BROMIDE 20 MG: 10 INJECTION, SOLUTION INTRAVENOUS at 13:06

## 2022-05-05 RX ADMIN — MIDAZOLAM HYDROCHLORIDE 2 MG: 1 INJECTION, SOLUTION INTRAMUSCULAR; INTRAVENOUS at 09:57

## 2022-05-05 RX ADMIN — Medication 100 MCG: at 12:44

## 2022-05-05 RX ADMIN — FENTANYL CITRATE 50 MCG: 50 INJECTION, SOLUTION INTRAMUSCULAR; INTRAVENOUS at 13:08

## 2022-05-05 RX ADMIN — ROCURONIUM BROMIDE 20 MG: 10 INJECTION, SOLUTION INTRAVENOUS at 12:37

## 2022-05-05 RX ADMIN — Medication 50 MCG: at 12:26

## 2022-05-05 RX ADMIN — BUPIVACAINE HYDROCHLORIDE 30 ML: 2.5 INJECTION, SOLUTION EPIDURAL; INFILTRATION; INTRACAUDAL; PERINEURAL at 11:30

## 2022-05-05 RX ADMIN — ONDANSETRON 4 MG: 2 INJECTION INTRAMUSCULAR; INTRAVENOUS at 12:35

## 2022-05-05 RX ADMIN — PROPOFOL 200 MG: 10 INJECTION, EMULSION INTRAVENOUS at 11:31

## 2022-05-05 NOTE — ANESTHESIA PROCEDURE NOTES
Airway  Urgency: elective    Date/Time: 5/5/2022 11:34 AM  Airway not difficult    General Information and Staff    Patient location during procedure: OR  Anesthesiologist: Terence Felipe MD CRNA/CAA: Charu Luis CRNA    Indications and Patient Condition  Indications for airway management: airway protection    Preoxygenated: yes  MILS not maintained throughout  Mask difficulty assessment: 1 - vent by mask    Final Airway Details  Final airway type: endotracheal airway      Successful airway: ETT  Cuffed: yes   Successful intubation technique: direct laryngoscopy  Facilitating devices/methods: intubating stylet  Endotracheal tube insertion site: oral  Blade: Geovanna  Blade size: 3  ETT size (mm): 7.0  Cormack-Lehane Classification: grade I - full view of glottis  Placement verified by: chest auscultation and capnometry   Measured from: lips  ETT/EBT  to lips (cm): 20  Number of attempts at approach: 1  Assessment: lips, teeth, and gum same as pre-op and atraumatic intubation    Additional Comments  Negative epigastric sounds, Breath sound equal bilaterally with symmetric chest rise and fall

## 2022-05-05 NOTE — H&P
Pre-Op H&P  Maryana Love  [id number]  1975      Chief complaint: Breast cancer      Subjective:  Patient is a 46 y.o.female presents for scheduled surgery by Dr. Chatman. She anticipates a LEFT BREAST RECONSTRUCTION DELAYED WITH TISSUE EXPANDER AND ALLODERM INSERTION, RIGHT CHEST WALL CYST EXCISION today. She has hx of breast cancer 7 years ago. She completed chemo. She underwent bilateral tissue expander exchange to permanent silicone gel implants on 9/23/2021. She had left breast implant removal 10/5/21 due to infection.      Review of Systems:  Constitutional-- No fever, chills or sweats. + fatigue, weight loss  CV-- No chest pain, palpitation or syncope  Resp-- No SOB, cough, hemoptysis  Skin--No rashes or lesions      Allergies:   Allergies   Allergen Reactions   • Nitrofurantoin Hives   • Aripiprazole Other (See Comments)     Tremors     • Chlorhexidine Rash         Home Meds:  Medications Prior to Admission   Medication Sig Dispense Refill Last Dose   • acamprosate (CAMPRAL) 333 MG EC tablet Take 666 mg by mouth 2 (two) times a day.   5/5/2022 at 0800   • buPROPion XL (WELLBUTRIN XL) 150 MG 24 hr tablet Take 300 mg by mouth Daily.   5/5/2022 at 0800   • lamoTRIgine (LaMICtal) 150 MG tablet Take 300 mg by mouth Every Night. Take 2 tablets daily   5/4/2022 at Unknown time   • topiramate (TOPAMAX) 25 MG capsule (sprinkle) Take 25 mg by mouth 2 (Two) Times a Day.   5/5/2022 at 0800   • naltrexone (DEPADE) 50 MG tablet Take 100 mg by mouth Daily. Perioperative, taking daily oral instead of monthly IM   5/2/2022   • Naltrexone (VIVITROL IM) Inject 1 dose into the appropriate muscle as directed by prescriber Every 30 (Thirty) Days.   More than a month at Unknown time   • OLANZapine (zyPREXA) 5 MG tablet Take 5 mg by mouth Every Night.   More than a month at Unknown time         PMH:   Past Medical History:   Diagnosis Date   • Allergic rhinitis    • Anxiety disorder     Description: Long-term.   •  Bipolar affective disorder (HCC)     Dx 2014- Coy.   • Chronic urinary tract infection     Description: Diagnosed 2008.  Cystoscopy normal 1/10/13, other than mild urethral inflammation. Normal diagnostic cystourethroscopy with urodynamics at  4/15.   • Colon polyps     Dx 4/19- fragments of tubular adenoma (ascending colon)- [facility]   • COVID-19 virus infection 01/21/2022   • Cyst of ovary     Description: Left (2.5 cm) dx by CT Scan 9/6/13.   • H/O abnormal mammogram     08/18/15-cat 5   • Hearing loss    • Hemangioma of liver     Description: Diagnosed by CT scan 9/13   • History of alcohol abuse 05/2021    recent time in rehab -discharged 5/18/21; currently on medications (Vivitrol and Campral)   • History of Papanicolaou smear of cervix 2008    normal per patient   • History of UTI     feb 2021-last UTI, required IV antibiotics and Infectious disease MD visit    • History of varicella    • Malignant neoplasm of upper-outer quadrant of left female breast (HCC)     Description: dx 8/15- infiltrating and in situ low-grade ductal adenocarcinoma (Stage 2A, receptor +. HER2 neg)      • Open breast wound     right breast from surgery in dec 2020   • Tremor      PSH:    Past Surgical History:   Procedure Laterality Date   • ABDOMINOPLASTY     • AUGMENTATION MAMMAPLASTY     • BELPHAROPTOSIS REPAIR Bilateral approx 11/2017   • BLADDER SURGERY      6/2/15- part of mesh removed   • BREAST BIOPSY Left 08/2005    cancer   • BREAST IMPLANT SURGERY Right 06/23/2016    Procedure: RIGHT BREAST RECONSTRUCTION, REVISION;  Surgeon: José Miguel Chatman MD;  Location: [facility] OR;  Service:    • BREAST IMPLANT SURGERY Right 06/04/2021    Procedure: BREAST COMPLEX CLOSURE, TISSUE EXPANDER EXCHANGE RIGHT;  Surgeon: José Miguel Chatman MD;  Location: [facility] OR;  Service: Plastics;  Laterality: Right;   • BREAST IMPLANT SURGERY Left 10/05/2021    Procedure: BREAST IMPLANT REVISION AND REMOVAL;  Surgeon: José Miguel Chatman MD;  Location:   [facility];  Service: Plastics;  Laterality: Left;   • BREAST IMPLANT SURGERY Left 10/19/2021    Procedure: BREAST IMPLANT REMOVAL LEFT;  Surgeon: José Miguel Chatman MD;  Location:  [facility];  Service: Plastics;  Laterality: Left;   • BREAST RECONSTRUCTION, BREAST TISSUE EXPANDER INSERTION Bilateral 12/15/2020    Procedure: CONVERSION OF RECONSTRUCTED BREASTS TISSUE EXPANDER PLACEMENT BILATERAL;  Surgeon: José Miguel Chatman MD;  Location:  [facility];  Service: Plastics;  Laterality: Bilateral;   • BREAST RECONSTRUCTION, BREAST TISSUE EXPANDER REMOVAL, IMPLANT INSERTION Bilateral 09/23/2021    Procedure: BREAST TISSUE EXPANDERS TO PERMANENT IMPLANT INSERTION BILATERAL;  Surgeon: José Miguel Chatman MD;  Location:  [facility];  Service: Plastics;  Laterality: Bilateral;   • BREAST SURGERY     • COLONOSCOPY     • COSMETIC SURGERY     • EYE SURGERY Right approx 10/2017    Stye removal   • FAT GRAFTING Bilateral 09/29/2016    Procedure: BILATERAL BREAST FAT GRAFTING;  Surgeon: José Miguel Chatman MD;  Location:  [facility];  Service:    • GASTRIC SLEEVE LAPAROSCOPIC      12/29/15   • LASIK Left approx 1/2018   • MASTECTOMY COMPLETE / SIMPLE W/ SENTINEL NODE BIOPSY Bilateral 10/05/2015    with reconstruction (sentinel node bx neg)   • NIPPLE RECONSTRUCTION Bilateral 09/29/2016    Procedure: BILATERAL NIPPLE/AEROLA RECONSTRUCTION WITH FLAP AND GRAFT;  Surgeon: José Miguel Chatman MD;  Location:  [facility];  Service:    • SINUS SURGERY  11/2012     endoscopic sinusotomies and polypectomies   • TOTAL ABDOMINAL HYSTERECTOMY  02/2008   • TUBAL ABDOMINAL LIGATION Bilateral 03/2008   • WISDOM TOOTH EXTRACTION     • WISDOM TOOTH EXTRACTION         Immunization History:  Influenza: No  Pneumococcal: No  Tetanus: UTD  Covid x3: 2021    Social History:   Tobacco:   Social History     Tobacco Use   Smoking Status Former Smoker   • Packs/day: 0.25   • Years: 20.00   • Pack years: 5.00   • Types: Cigarettes, Electronic Cigarette   • Start date: 1993  "  • Quit date: 2022   • Years since quittin.2   Smokeless Tobacco Never Used   Tobacco Comment    Quit intermittently for several years at a time; recently in alcohol rehab and restarted smoking but quit 21 and started  Vaping daily since 21      Alcohol:     Social History     Substance and Sexual Activity   Alcohol Use Not Currently    Comment: recent alcohol rehab admission; discharged 21         Physical Exam:/75 (BP Location: Right arm, Patient Position: Sitting)   Pulse 70   Temp 97.9 °F (36.6 °C) (Temporal)   Resp 18   Ht 165.1 cm (65\")   Wt 61.7 kg (136 lb)   LMP  (LMP Unknown) Comment: LAST MAMOGRAM 2015  SpO2 100%   Breastfeeding No   BMI 22.63 kg/m²       General Appearance:    Alert, cooperative, no distress, appears stated age   Head:    Normocephalic, without obvious abnormality, atraumatic   Lungs:     Clear to auscultation bilaterally, respirations unlabored    Heart:   Regular rate and rhythm, S1 and S2 normal    Abdomen:    Soft without tenderness   Extremities:   Extremities normal, atraumatic, no cyanosis or edema   Skin:   Skin color, texture, turgor normal, no rashes or lesions   Neurologic:   Grossly intact     Results Review:     LABS:  Lab Results   Component Value Date    WBC 5.11 2022    HGB 13.2 2022    HCT 40.5 2022    MCV 98.3 (H) 2022     2022    NEUTROABS 2.31 2022    GLUCOSE 145 (H) 2022    BUN 11 2022    CREATININE 0.90 2022    EGFRIFNONA 69 2021     2022    K 3.8 2022     (H) 2022    CO2 24.0 2022    CALCIUM 9.4 2022    ALBUMIN 4.10 2021    AST 21 2021    ALT 13 2021    BILITOT 0.4 2021       RADIOLOGY:  Imaging Results (Last 72 Hours)     ** No results found for the last 72 hours. **          I reviewed the patient's new clinical results.    Cancer Staging (if applicable)  Cancer Patient: __ yes __no " __unknown; If yes, clinical stage T:__ N:__M:__, stage group or __N/A      Impression: Stage IIA, ER/MI positive, HER-2 negative left breast cancer       Plan: LEFT BREAST RECONSTRUCTION DELAYED WITH TISSUE EXPANDER AND ALLODERM INSERTION, RIGHT CHEST WALL CYST EXCISION      Yazmin Singh, APRN   5/5/2022   09:46 EDT

## 2022-05-05 NOTE — ANESTHESIA PREPROCEDURE EVALUATION
Anesthesia Evaluation     Patient summary reviewed and Nursing notes reviewed   no history of anesthetic complications:  NPO Solid Status: > 8 hours  NPO Liquid Status: > 2 hours           Airway   Mallampati: II  TM distance: >3 FB  Neck ROM: full  No difficulty expected  Dental - normal exam     Pulmonary     breath sounds clear to auscultation  (+) a smoker Former Abstained day of surgery,   Cardiovascular   Exercise tolerance: good (4-7 METS)    ECG reviewed  Rhythm: regular  Rate: normal        Neuro/Psych  (+) tremors, psychiatric history Anxiety and Bipolar,    GI/Hepatic/Renal/Endo    (+)   liver disease history of elevated LFT,     Musculoskeletal     Abdominal   (-) obese    Abdomen: soft.   Substance History   (+) alcohol use, drug use     OB/GYN          Other   arthritis,    history of cancer remission                  Anesthesia Plan    ASA 3     general with block     intravenous induction     Anesthetic plan, all risks, benefits, and alternatives have been provided, discussed and informed consent has been obtained with: patient.    Plan discussed with CRNA.        CODE STATUS:

## 2022-05-05 NOTE — ANESTHESIA POSTPROCEDURE EVALUATION
Patient: Maryana Love    Procedure Summary     Date: 05/05/22 Room / Location:  [facility]    Anesthesia Start: 1128 Anesthesia Stop: 1401    Procedure: LEFT BREAST RECONSTRUCTION DELAYED WITH TISSUE EXPANDER AND ALLODERM INSERTION, RIGHT CHEST WALL CYST EXCISION (Left Breast) Diagnosis:       Acquired absence of breast and absent nipple, bilateral      Personal history of breast cancer    Surgeons: José Miguel Chatman MD Provider: Terence Felipe MD    Anesthesia Type: general with block ASA Status: 3          Anesthesia Type: general with block    Vitals  Vitals Value Taken Time   /84 05/05/22 1355   Temp     Pulse 68 05/05/22 1359   Resp     SpO2 100 % 05/05/22 1359   Vitals shown include unvalidated device data.        Post Anesthesia Care and Evaluation    Patient location during evaluation: PACU  Patient participation: complete - patient participated  Level of consciousness: awake and alert  Pain score: 5  Pain management: inadequate  Airway patency: patent  Anesthetic complications: No anesthetic complications  PONV Status: none  Cardiovascular status: hemodynamically stable and acceptable  Respiratory status: nonlabored ventilation, acceptable, nasal cannula and spontaneous ventilation  Hydration status: acceptable

## 2022-05-05 NOTE — BRIEF OP NOTE
With InspiraBREAST RECONSTRUCTION DELAYED WITH TISSUE EXPANDER INSERTION  Progress Note    Maryana Love  5/5/2022    Pre-op Diagnosis:   * Acquired absence of breast and absent nipple, bilateral [Z90.13]     * Personal history of breast cancer [Z85.3]  Right chest wall inclusion cyst       Post-Op Diagnosis Codes:     * Acquired absence of breast and absent nipple, bilateral [Z90.13]     * Personal history of breast cancer [Z85.3]   Right chest wall inclusion cyst    Procedure/CPT® Codes:  NE TISSUE EXPANDER PLACEMENT BREAST RECONSTRUCTION [29966]  NE IMPLNT BIO IMPLNT FOR SOFT TISSUE REINFORCEMENT [74019]   Excision of right chest wall inclusion cyst 1 cm      Procedure(s):  LEFT BREAST RECONSTRUCTION DELAYED WITH TISSUE EXPANDER AND ALLODERM INSERTION, RIGHT CHEST WALL CYST EXCISION    Surgeon(s):  José Miguel Chatman MD    Anesthesia: General with Block    Staff:   Circulator: Albert Ingram RN  Scrub Person: Charu Romero         Estimated Blood Loss: minimal    Urine Voided: * No values recorded between 5/5/2022 11:29 AM and 5/5/2022 11:38 AM *    Specimens:                None          Drains:   [REMOVED] Closed/Suction Drain 1 Lateral LUQ Bulb 15 Fr. (Removed)       Findings: absent breast        Complications: none immediate          José Miguel Chatman MD     Date: 5/5/2022  Time: 11:38 EDT

## 2022-05-05 NOTE — OP NOTE
Preoperative diagnosis:  1.  Personal history breast cancer  2.  Absent bilateral breast and nipple areolar complex  3.  Right chest wall inclusion cyst    Postoperative diagnosis:  1.  Personal history breast cancer  2.  Absent bilateral breast and nipple areolar complex  3.  Right chest wall inclusion cyst    Surgeon: José Miguel Chatman M.D.    Assistant: Jumana BARRAZA was responsible for performing the following activities: Retraction, Suction, Irrigation, Suturing, Closing and Placing Dressing and their skilled assistance was necessary for the success of this case.    Anesthesia: General    Procedure: 1. Left delayed implant-based breast reconstruction with placement of tissue expanders and AlloDerm.  The tissue expanders are Allergan volume 400 cc basewidth 12 cm filled with 100 cc of air.  The serial number is [id number].  The AlloDerm was 320 cm².  The lot number is  [id number].  2.  Excision of right chest wall epidermal inclusion cyst measuring 1 cm    Indication: The patient is a 46-year-old white female who developed breast cancer.  She underwent implant based breast reconstruction.  Unfortunately, she developed an infection necessitating removal of her left implant.  She desires delayed left breast reconstruction were discussed.  The main techniques were discussed.  She ultimately elected to pursue implant-based breast reconstruction.  The technique potential complications and typical postoperative course were discussed with the patient.  She elected to proceed with this procedure.    Findings:  1.  Absent breast and nipple or areolar complex    Description: The patient was taken from preoperative holding to the operating room after informed consent was signed on the chart and placed under general anesthesia successfully.  The patient received a prophylactic dose of antibiotics and had bilateral lower extremity sequential compression devices in place and operational prior to induction of anesthesia.  She  was supine on the operating table.  She had a pillow placed beneath her knees.  Her shoulders were gently abducted to 90° and she had ulnar nerve padding.  She had a Escduero catheter placed.  Her chest wall was prepped in the usual sterile fashion.  After properly identifying the patient's problem, the left breast mastectomy incision was incised with a 10 blade.  Skin flaps were developed to the level of my marks.  The skin flaps were noted to be adequate.  The pocket was copiously irrigated with antibiotic solution.  Hemostasis was achieved with electrocautery.  The skin flaps were inspected and noted to be viable.  The tissue expander and AlloDerm were brought in the operative field.  It was soaked in saline for 2 minutes.  The tissue expander was placed into the subcutaneous pocket and secured at the medial lateral and superior suture tab with 2-0 silk suture.  The AlloDerm was used for soft tissue support and secured with 3-0 Vicryl suture in a horizontal mattress fashion.  [address] [place] Tobias drains were placed in the subcutaneous space.  They were brought out the thoracoabdominal region and secured in standard fashion.  The skin was closed with 3-0 Monocryl suture in a deep dermal buried interrupted fashion and 3-0 strata fix suture in an intracuticular fashion.  The expander was filled with 250 cc of air.  While my assistant was closing, the right chest wall epidermal inclusion cyst was identified.  An elliptical incision was marked over the top of the cyst.  The cyst measured 1 cm in diameter.  A 15 blade is used to incise the skin.  Sharp scissors were then used to dissect the cyst from the surrounding tissue intact.  It was sent to pathology for sampling.  The wound was closed with 3-0 Monocryl suture in a deep dermal buried interrupted fashion and 5-0 nylon suture in a simple running fashion.  A Prevena incisional wound VAC was applied to the left reconstructed breast.  A 2 x 2 covered arm and ointment was  applied to the right chest wall closure.  A surgical bra and Kerlix fluffs was also applied.  The case was then turned over to anesthesia at which point the patient was awoken from general anesthesia successfully and taken to PACU in stable condition.  I was present for the entire procedure.  All counts were correct.    Estimated blood loss: Minimal    Drains: 2    Complications: None immediate

## 2022-05-05 NOTE — ANESTHESIA PROCEDURE NOTES
Peripheral Block      Patient reassessed immediately prior to procedure    Patient location during procedure: OR  Start time: 5/5/2022 11:35 AM  Reason for block: at surgeon's request and post-op pain management  Performed by  Anesthesiologist: Terence Felipe MD  Preanesthetic Checklist  Completed: patient identified, IV checked, site marked, risks and benefits discussed, surgical consent, monitors and equipment checked, pre-op evaluation and timeout performed  Prep:  Pt Position: supine  Sterile barriers:cap, gloves, gown and mask  Prep: ChloraPrep  Patient monitoring: blood pressure monitoring, continuous pulse oximetry and EKG  Procedure  Performed under: general  Guidance:ultrasound guided and landmark technique  Images:still images obtained, printed/placed on chart    Laterality:left  Block Type:PECS I and PECS II  Injection Technique:single-shot  Needle Type:short-bevel  Needle Gauge:20 G  Resistance on Injection: none    Medications Used: dexamethasone sodium phosphate injection, 2 mg  bupivacaine PF (MARCAINE) 0.25 % injection, 30 mL      Medications  Preservative Free Saline:10ml  Comment:Block Injection:  Total volume of LA divided between Right and Left sided blocks         Post Assessment  Injection Assessment: negative aspiration for heme and incremental injection  Patient Tolerance:comfortable throughout block  Complications:no  Additional Notes  The pt. Was placed in the Supine Position and GA was induced     The insertion site was prepped with CHG and Ultrasound guidance with In-Plane techniquewas  a 4inch BBraun 360 degree echogenic needle was visualized.  Normal Saline PSF was  utilized for hydrodissection of tissue. PECS 1 Block- Pectoralis Major and Minor where identified and LA was injected between PMM and PmM at the level of the 3rd Rib(10ml),  PECS 2-  Pectoralis Minor and Serratus muscle where identified and the needle was advanced laterally in-plane with the 4th rib as a backstop,  pleura was monitored.  LA was injected between SA and PmM at the level of 4th rib( 20ml).  LA injection spread was visualized, injection was incremental 1-5ml, normal or low injection pressure, no intravascular injection, no pneumothorax appreciated.  Thank You.

## 2022-05-06 LAB
COTININE UR QL SCN: NEGATIVE NG/ML
CYTO UR: NORMAL
LAB AP CASE REPORT: NORMAL
LAB AP CLINICAL INFORMATION: NORMAL
Lab: NORMAL
PATH REPORT.FINAL DX SPEC: NORMAL
PATH REPORT.GROSS SPEC: NORMAL

## 2022-09-06 ENCOUNTER — TELEPHONE (OUTPATIENT)
Dept: INTERNAL MEDICINE | Facility: CLINIC | Age: 47
End: 2022-09-06

## 2022-09-06 NOTE — TELEPHONE ENCOUNTER
I received a voice mail from Sathya at the [facility] on 8/31/2022 at 11:45am that they need a  authorization for patient's yearly eye exam which is scheduled for tomorrow.    Fax is [phone]  Phone is [phone]

## 2022-09-06 NOTE — TELEPHONE ENCOUNTER
Reached Aunamalia at [phone]  Told her patient has  select so she does not need a referral with .  She stated she will add that to her appointment note-she has rescheduled for tomorrow

## 2022-09-07 ENCOUNTER — TRANSCRIBE ORDERS (OUTPATIENT)
Dept: LAB | Facility: HOSPITAL | Age: 47
End: 2022-09-07

## 2022-09-07 ENCOUNTER — LAB (OUTPATIENT)
Dept: LAB | Facility: HOSPITAL | Age: 47
End: 2022-09-07

## 2022-09-07 DIAGNOSIS — R30.0 DYSURIA: ICD-10-CM

## 2022-09-07 DIAGNOSIS — R30.0 DYSURIA: Primary | ICD-10-CM

## 2022-09-07 LAB
BACTERIA UR QL AUTO: ABNORMAL /HPF
BILIRUB UR QL STRIP: ABNORMAL
CLARITY UR: ABNORMAL
COLOR UR: ABNORMAL
GLUCOSE UR STRIP-MCNC: NEGATIVE MG/DL
HGB UR QL STRIP.AUTO: ABNORMAL
HYALINE CASTS UR QL AUTO: ABNORMAL /LPF
KETONES UR QL STRIP: ABNORMAL
LEUKOCYTE ESTERASE UR QL STRIP.AUTO: ABNORMAL
NITRITE UR QL STRIP: POSITIVE
PH UR STRIP.AUTO: 6 [PH] (ref 5–8)
PROT UR QL STRIP: NEGATIVE
RBC # UR STRIP: ABNORMAL /HPF
REF LAB TEST METHOD: ABNORMAL
SP GR UR STRIP: 1.02 (ref 1–1.03)
SQUAMOUS #/AREA URNS HPF: ABNORMAL /HPF
UROBILINOGEN UR QL STRIP: ABNORMAL
WBC # UR STRIP: ABNORMAL /HPF

## 2022-09-07 PROCEDURE — 87077 CULTURE AEROBIC IDENTIFY: CPT

## 2022-09-07 PROCEDURE — 87186 SC STD MICRODIL/AGAR DIL: CPT

## 2022-09-07 PROCEDURE — 87086 URINE CULTURE/COLONY COUNT: CPT

## 2022-09-07 PROCEDURE — 81001 URINALYSIS AUTO W/SCOPE: CPT

## 2022-09-09 LAB — BACTERIA SPEC AEROBE CULT: ABNORMAL

## 2023-01-05 ENCOUNTER — APPOINTMENT (OUTPATIENT)
Dept: PREADMISSION TESTING | Facility: HOSPITAL | Age: 48
End: 2023-01-05

## 2023-03-09 ENCOUNTER — PRE-ADMISSION TESTING (OUTPATIENT)
Dept: PREADMISSION TESTING | Facility: HOSPITAL | Age: 48
End: 2023-03-09
Payer: OTHER GOVERNMENT

## 2023-03-09 VITALS — WEIGHT: 155.31 LBS | BODY MASS INDEX: 25.88 KG/M2 | HEIGHT: 65 IN

## 2023-03-09 LAB
ANION GAP SERPL CALCULATED.3IONS-SCNC: 8 MMOL/L (ref 5–15)
BUN SERPL-MCNC: 16 MG/DL (ref 6–20)
BUN/CREAT SERPL: 25.8 (ref 7–25)
CALCIUM SPEC-SCNC: 9.3 MG/DL (ref 8.6–10.5)
CHLORIDE SERPL-SCNC: 107 MMOL/L (ref 98–107)
CO2 SERPL-SCNC: 27 MMOL/L (ref 22–29)
CREAT SERPL-MCNC: 0.62 MG/DL (ref 0.57–1)
DEPRECATED RDW RBC AUTO: 41.3 FL (ref 37–54)
EGFRCR SERPLBLD CKD-EPI 2021: 110.7 ML/MIN/1.73
ERYTHROCYTE [DISTWIDTH] IN BLOOD BY AUTOMATED COUNT: 11.4 % (ref 12.3–15.4)
GLUCOSE SERPL-MCNC: 86 MG/DL (ref 65–99)
HCT VFR BLD AUTO: 41.5 % (ref 34–46.6)
HGB BLD-MCNC: 13.8 G/DL (ref 12–15.9)
MCH RBC QN AUTO: 33.1 PG (ref 26.6–33)
MCHC RBC AUTO-ENTMCNC: 33.3 G/DL (ref 31.5–35.7)
MCV RBC AUTO: 99.5 FL (ref 79–97)
PLATELET # BLD AUTO: 285 10*3/MM3 (ref 140–450)
PMV BLD AUTO: 9.2 FL (ref 6–12)
POTASSIUM SERPL-SCNC: 4.4 MMOL/L (ref 3.5–5.2)
RBC # BLD AUTO: 4.17 10*6/MM3 (ref 3.77–5.28)
SODIUM SERPL-SCNC: 142 MMOL/L (ref 136–145)
WBC NRBC COR # BLD: 8.48 10*3/MM3 (ref 3.4–10.8)

## 2023-03-09 PROCEDURE — 36415 COLL VENOUS BLD VENIPUNCTURE: CPT

## 2023-03-09 PROCEDURE — 85027 COMPLETE CBC AUTOMATED: CPT

## 2023-03-09 PROCEDURE — 80048 BASIC METABOLIC PNL TOTAL CA: CPT

## 2023-03-16 ENCOUNTER — ANESTHESIA (OUTPATIENT)
Dept: PERIOP | Facility: HOSPITAL | Age: 48
End: 2023-03-16
Payer: OTHER GOVERNMENT

## 2023-03-16 ENCOUNTER — ANESTHESIA EVENT (OUTPATIENT)
Dept: PERIOP | Facility: HOSPITAL | Age: 48
End: 2023-03-16
Payer: OTHER GOVERNMENT

## 2023-03-16 ENCOUNTER — HOSPITAL ENCOUNTER (OUTPATIENT)
Facility: HOSPITAL | Age: 48
Setting detail: HOSPITAL OUTPATIENT SURGERY
Discharge: HOME | End: 2023-03-16
Attending: PLASTIC SURGERY | Admitting: PLASTIC SURGERY
Payer: OTHER GOVERNMENT

## 2023-03-16 VITALS
DIASTOLIC BLOOD PRESSURE: 78 MMHG | TEMPERATURE: 97.4 F | OXYGEN SATURATION: 100 % | SYSTOLIC BLOOD PRESSURE: 125 MMHG | WEIGHT: 135 LBS | HEART RATE: 60 BPM | RESPIRATION RATE: 14 BRPM | BODY MASS INDEX: 22.49 KG/M2 | HEIGHT: 65 IN

## 2023-03-16 PROCEDURE — 25010000002 FENTANYL CITRATE (PF) 100 MCG/2ML SOLUTION: Performed by: NURSE ANESTHETIST, CERTIFIED REGISTERED

## 2023-03-16 PROCEDURE — 25010000002 GENTAMICIN PER 80 MG: Performed by: PLASTIC SURGERY

## 2023-03-16 PROCEDURE — 25010000002 CEFAZOLIN IN DEXTROSE 2-4 GM/100ML-% SOLUTION: Performed by: PLASTIC SURGERY

## 2023-03-16 PROCEDURE — 25010000002 ONDANSETRON PER 1 MG: Performed by: NURSE ANESTHETIST, CERTIFIED REGISTERED

## 2023-03-16 PROCEDURE — 25010000002 FENTANYL CITRATE (PF) 50 MCG/ML SOLUTION

## 2023-03-16 PROCEDURE — C1789 PROSTHESIS, BREAST, IMP: HCPCS | Performed by: PLASTIC SURGERY

## 2023-03-16 PROCEDURE — 25010000002 DEXAMETHASONE PER 1 MG: Performed by: NURSE ANESTHETIST, CERTIFIED REGISTERED

## 2023-03-16 PROCEDURE — 25010000002 MIDAZOLAM PER 1 MG: Performed by: ANESTHESIOLOGY

## 2023-03-16 PROCEDURE — 25010000002 PROPOFOL 10 MG/ML EMULSION: Performed by: NURSE ANESTHETIST, CERTIFIED REGISTERED

## 2023-03-16 PROCEDURE — 25010000002 CEFAZOLIN PER 500 MG: Performed by: PLASTIC SURGERY

## 2023-03-16 PROCEDURE — 25010000002 HYDROMORPHONE 1 MG/ML SOLUTION

## 2023-03-16 PROCEDURE — S0260 H&P FOR SURGERY: HCPCS

## 2023-03-16 DEVICE — IMPLANTABLE DEVICE: Type: IMPLANTABLE DEVICE | Site: BREAST | Status: FUNCTIONAL

## 2023-03-16 RX ORDER — SODIUM CHLORIDE 9 MG/ML
40 INJECTION, SOLUTION INTRAVENOUS AS NEEDED
Status: DISCONTINUED | OUTPATIENT
Start: 2023-03-16 | End: 2023-03-16

## 2023-03-16 RX ORDER — PROMETHAZINE HYDROCHLORIDE 25 MG/1
25 SUPPOSITORY RECTAL ONCE AS NEEDED
Status: DISCONTINUED | OUTPATIENT
Start: 2023-03-16 | End: 2023-03-16 | Stop reason: HOSPADM

## 2023-03-16 RX ORDER — PROPOFOL 10 MG/ML
VIAL (ML) INTRAVENOUS AS NEEDED
Status: DISCONTINUED | OUTPATIENT
Start: 2023-03-16 | End: 2023-03-16 | Stop reason: SURG

## 2023-03-16 RX ORDER — HYDRALAZINE HYDROCHLORIDE 20 MG/ML
5 INJECTION INTRAMUSCULAR; INTRAVENOUS
Status: DISCONTINUED | OUTPATIENT
Start: 2023-03-16 | End: 2023-03-16 | Stop reason: HOSPADM

## 2023-03-16 RX ORDER — IPRATROPIUM BROMIDE AND ALBUTEROL SULFATE 2.5; .5 MG/3ML; MG/3ML
3 SOLUTION RESPIRATORY (INHALATION) ONCE AS NEEDED
Status: DISCONTINUED | OUTPATIENT
Start: 2023-03-16 | End: 2023-03-16 | Stop reason: HOSPADM

## 2023-03-16 RX ORDER — HYDROCODONE BITARTRATE AND ACETAMINOPHEN 5; 325 MG/1; MG/1
1 TABLET ORAL ONCE AS NEEDED
Status: DISCONTINUED | OUTPATIENT
Start: 2023-03-16 | End: 2023-03-16 | Stop reason: HOSPADM

## 2023-03-16 RX ORDER — FENTANYL CITRATE 50 UG/ML
50 INJECTION, SOLUTION INTRAMUSCULAR; INTRAVENOUS
Status: DISCONTINUED | OUTPATIENT
Start: 2023-03-16 | End: 2023-03-16 | Stop reason: HOSPADM

## 2023-03-16 RX ORDER — SODIUM CHLORIDE 0.9 % (FLUSH) 0.9 %
3 SYRINGE (ML) INJECTION EVERY 12 HOURS SCHEDULED
Status: DISCONTINUED | OUTPATIENT
Start: 2023-03-16 | End: 2023-03-16 | Stop reason: HOSPADM

## 2023-03-16 RX ORDER — ONDANSETRON 2 MG/ML
INJECTION INTRAMUSCULAR; INTRAVENOUS AS NEEDED
Status: DISCONTINUED | OUTPATIENT
Start: 2023-03-16 | End: 2023-03-16 | Stop reason: SURG

## 2023-03-16 RX ORDER — LIDOCAINE HYDROCHLORIDE AND EPINEPHRINE 10; 10 MG/ML; UG/ML
INJECTION, SOLUTION INFILTRATION; PERINEURAL AS NEEDED
Status: DISCONTINUED | OUTPATIENT
Start: 2023-03-16 | End: 2023-03-16 | Stop reason: HOSPADM

## 2023-03-16 RX ORDER — MIDAZOLAM HYDROCHLORIDE 1 MG/ML
2 INJECTION INTRAMUSCULAR; INTRAVENOUS
Status: COMPLETED | OUTPATIENT
Start: 2023-03-16 | End: 2023-03-16

## 2023-03-16 RX ORDER — LIDOCAINE HYDROCHLORIDE 10 MG/ML
INJECTION, SOLUTION EPIDURAL; INFILTRATION; INTRACAUDAL; PERINEURAL AS NEEDED
Status: DISCONTINUED | OUTPATIENT
Start: 2023-03-16 | End: 2023-03-16 | Stop reason: SURG

## 2023-03-16 RX ORDER — DROPERIDOL 2.5 MG/ML
0.62 INJECTION, SOLUTION INTRAMUSCULAR; INTRAVENOUS ONCE AS NEEDED
Status: DISCONTINUED | OUTPATIENT
Start: 2023-03-16 | End: 2023-03-16 | Stop reason: HOSPADM

## 2023-03-16 RX ORDER — FENTANYL CITRATE 50 UG/ML
INJECTION, SOLUTION INTRAMUSCULAR; INTRAVENOUS
Status: COMPLETED
Start: 2023-03-16 | End: 2023-03-16

## 2023-03-16 RX ORDER — MAGNESIUM HYDROXIDE 1200 MG/15ML
LIQUID ORAL AS NEEDED
Status: DISCONTINUED | OUTPATIENT
Start: 2023-03-16 | End: 2023-03-16 | Stop reason: HOSPADM

## 2023-03-16 RX ORDER — ROCURONIUM BROMIDE 10 MG/ML
INJECTION, SOLUTION INTRAVENOUS AS NEEDED
Status: DISCONTINUED | OUTPATIENT
Start: 2023-03-16 | End: 2023-03-16 | Stop reason: SURG

## 2023-03-16 RX ORDER — SODIUM CHLORIDE, SODIUM LACTATE, POTASSIUM CHLORIDE, CALCIUM CHLORIDE 600; 310; 30; 20 MG/100ML; MG/100ML; MG/100ML; MG/100ML
9 INJECTION, SOLUTION INTRAVENOUS CONTINUOUS PRN
Status: DISCONTINUED | OUTPATIENT
Start: 2023-03-16 | End: 2023-03-16 | Stop reason: HOSPADM

## 2023-03-16 RX ORDER — SODIUM CHLORIDE 0.9 % (FLUSH) 0.9 %
10 SYRINGE (ML) INJECTION AS NEEDED
Status: DISCONTINUED | OUTPATIENT
Start: 2023-03-16 | End: 2023-03-16

## 2023-03-16 RX ORDER — ONDANSETRON 2 MG/ML
4 INJECTION INTRAMUSCULAR; INTRAVENOUS ONCE AS NEEDED
Status: DISCONTINUED | OUTPATIENT
Start: 2023-03-16 | End: 2023-03-16 | Stop reason: HOSPADM

## 2023-03-16 RX ORDER — SODIUM CHLORIDE 0.9 % (FLUSH) 0.9 %
10 SYRINGE (ML) INJECTION EVERY 12 HOURS SCHEDULED
Status: DISCONTINUED | OUTPATIENT
Start: 2023-03-16 | End: 2023-03-16

## 2023-03-16 RX ORDER — FENTANYL CITRATE 50 UG/ML
INJECTION, SOLUTION INTRAMUSCULAR; INTRAVENOUS AS NEEDED
Status: DISCONTINUED | OUTPATIENT
Start: 2023-03-16 | End: 2023-03-16 | Stop reason: SURG

## 2023-03-16 RX ORDER — SODIUM CHLORIDE, SODIUM LACTATE, POTASSIUM CHLORIDE, CALCIUM CHLORIDE 600; 310; 30; 20 MG/100ML; MG/100ML; MG/100ML; MG/100ML
INJECTION, SOLUTION INTRAVENOUS CONTINUOUS PRN
Status: DISCONTINUED | OUTPATIENT
Start: 2023-03-16 | End: 2023-03-16 | Stop reason: SURG

## 2023-03-16 RX ORDER — SODIUM CHLORIDE 9 MG/ML
INJECTION, SOLUTION INTRAVENOUS AS NEEDED
Status: DISCONTINUED | OUTPATIENT
Start: 2023-03-16 | End: 2023-03-16 | Stop reason: HOSPADM

## 2023-03-16 RX ORDER — NALOXONE HCL 0.4 MG/ML
0.4 VIAL (ML) INJECTION AS NEEDED
Status: DISCONTINUED | OUTPATIENT
Start: 2023-03-16 | End: 2023-03-16 | Stop reason: HOSPADM

## 2023-03-16 RX ORDER — MEPERIDINE HYDROCHLORIDE 25 MG/ML
12.5 INJECTION INTRAMUSCULAR; INTRAVENOUS; SUBCUTANEOUS
Status: DISCONTINUED | OUTPATIENT
Start: 2023-03-16 | End: 2023-03-16 | Stop reason: HOSPADM

## 2023-03-16 RX ORDER — DEXAMETHASONE SODIUM PHOSPHATE 4 MG/ML
INJECTION, SOLUTION INTRA-ARTICULAR; INTRALESIONAL; INTRAMUSCULAR; INTRAVENOUS; SOFT TISSUE AS NEEDED
Status: DISCONTINUED | OUTPATIENT
Start: 2023-03-16 | End: 2023-03-16 | Stop reason: SURG

## 2023-03-16 RX ORDER — CEFAZOLIN SODIUM 2 G/100ML
2 INJECTION, SOLUTION INTRAVENOUS ONCE
Status: COMPLETED | OUTPATIENT
Start: 2023-03-16 | End: 2023-03-16

## 2023-03-16 RX ORDER — FAMOTIDINE 20 MG/1
20 TABLET, FILM COATED ORAL
Status: COMPLETED | OUTPATIENT
Start: 2023-03-16 | End: 2023-03-16

## 2023-03-16 RX ORDER — LABETALOL HYDROCHLORIDE 5 MG/ML
5 INJECTION, SOLUTION INTRAVENOUS
Status: DISCONTINUED | OUTPATIENT
Start: 2023-03-16 | End: 2023-03-16 | Stop reason: HOSPADM

## 2023-03-16 RX ORDER — SODIUM CHLORIDE 0.9 % (FLUSH) 0.9 %
3-10 SYRINGE (ML) INJECTION AS NEEDED
Status: DISCONTINUED | OUTPATIENT
Start: 2023-03-16 | End: 2023-03-16 | Stop reason: HOSPADM

## 2023-03-16 RX ORDER — PROMETHAZINE HYDROCHLORIDE 25 MG/1
25 TABLET ORAL ONCE AS NEEDED
Status: DISCONTINUED | OUTPATIENT
Start: 2023-03-16 | End: 2023-03-16 | Stop reason: HOSPADM

## 2023-03-16 RX ORDER — LIDOCAINE HYDROCHLORIDE 10 MG/ML
0.5 INJECTION, SOLUTION EPIDURAL; INFILTRATION; INTRACAUDAL; PERINEURAL ONCE AS NEEDED
Status: DISCONTINUED | OUTPATIENT
Start: 2023-03-16 | End: 2023-03-16

## 2023-03-16 RX ORDER — SODIUM CHLORIDE 9 MG/ML
40 INJECTION, SOLUTION INTRAVENOUS AS NEEDED
Status: DISCONTINUED | OUTPATIENT
Start: 2023-03-16 | End: 2023-03-16 | Stop reason: HOSPADM

## 2023-03-16 RX ORDER — MIDAZOLAM HYDROCHLORIDE 1 MG/ML
1 INJECTION INTRAMUSCULAR; INTRAVENOUS
Status: DISCONTINUED | OUTPATIENT
Start: 2023-03-16 | End: 2023-03-16

## 2023-03-16 RX ORDER — DROPERIDOL 2.5 MG/ML
0.62 INJECTION, SOLUTION INTRAMUSCULAR; INTRAVENOUS
Status: DISCONTINUED | OUTPATIENT
Start: 2023-03-16 | End: 2023-03-16 | Stop reason: HOSPADM

## 2023-03-16 RX ADMIN — MIDAZOLAM HYDROCHLORIDE 2 MG: 1 INJECTION, SOLUTION INTRAMUSCULAR; INTRAVENOUS at 07:28

## 2023-03-16 RX ADMIN — DEXAMETHASONE SODIUM PHOSPHATE 4 MG: 4 INJECTION, SOLUTION INTRAMUSCULAR; INTRAVENOUS at 07:45

## 2023-03-16 RX ADMIN — SODIUM CHLORIDE, POTASSIUM CHLORIDE, SODIUM LACTATE AND CALCIUM CHLORIDE 9 ML/HR: 600; 310; 30; 20 INJECTION, SOLUTION INTRAVENOUS at 06:54

## 2023-03-16 RX ADMIN — CEFAZOLIN SODIUM 2 G: 2 INJECTION, SOLUTION INTRAVENOUS at 07:35

## 2023-03-16 RX ADMIN — FENTANYL CITRATE 50 MCG: 50 INJECTION, SOLUTION INTRAMUSCULAR; INTRAVENOUS at 09:22

## 2023-03-16 RX ADMIN — Medication 0.5 MG: at 09:17

## 2023-03-16 RX ADMIN — FENTANYL CITRATE 100 MCG: 50 INJECTION, SOLUTION INTRAMUSCULAR; INTRAVENOUS at 07:36

## 2023-03-16 RX ADMIN — SODIUM CHLORIDE, POTASSIUM CHLORIDE, SODIUM LACTATE AND CALCIUM CHLORIDE: 600; 310; 30; 20 INJECTION, SOLUTION INTRAVENOUS at 07:30

## 2023-03-16 RX ADMIN — FAMOTIDINE 20 MG: 20 TABLET ORAL at 06:55

## 2023-03-16 RX ADMIN — ROCURONIUM BROMIDE 50 MG: 10 INJECTION INTRAVENOUS at 07:37

## 2023-03-16 RX ADMIN — PROPOFOL 20 MG: 10 INJECTION, EMULSION INTRAVENOUS at 07:36

## 2023-03-16 RX ADMIN — ONDANSETRON 4 MG: 2 INJECTION INTRAMUSCULAR; INTRAVENOUS at 08:42

## 2023-03-16 RX ADMIN — SUGAMMADEX 200 MG: 100 INJECTION, SOLUTION INTRAVENOUS at 08:43

## 2023-03-16 RX ADMIN — FENTANYL CITRATE 50 MCG: 50 INJECTION, SOLUTION INTRAMUSCULAR; INTRAVENOUS at 09:33

## 2023-03-16 RX ADMIN — LIDOCAINE HYDROCHLORIDE 50 MG: 10 INJECTION, SOLUTION EPIDURAL; INFILTRATION; INTRACAUDAL; PERINEURAL at 07:36

## 2023-03-16 RX ADMIN — HYDROMORPHONE HYDROCHLORIDE 0.5 MG: 1 INJECTION, SOLUTION INTRAMUSCULAR; INTRAVENOUS; SUBCUTANEOUS at 09:17

## 2023-03-16 NOTE — ANESTHESIA POSTPROCEDURE EVALUATION
Patient: Maryana Love    Procedure Summary     Date: 03/16/23 Room / Location:  [facility]    Anesthesia Start: 0730 Anesthesia Stop: 0905    Procedure: LEFT BREAST TISSUE EXPANDER EXCHANGE TO PERMANENT IMPLANT (Left: Breast) Diagnosis:       Acquired absence of breast and absent nipple, bilateral      Personal history of breast cancer      Breast asymmetry    Surgeons: José Miguel Chatman MD Provider: Terence Felipe MD    Anesthesia Type: general ASA Status: 2          Anesthesia Type: general    Vitals  No vitals data found for the desired time range.          Post Anesthesia Care and Evaluation    Patient location during evaluation: PACU  Patient participation: complete - patient participated  Level of consciousness: agitated  Pain management: adequate    Airway patency: patent  PONV Status: none  Cardiovascular status: hemodynamically stable  Respiratory status: acceptable, nasal cannula and oral airway  Hydration status: acceptable    Comments: BP: 11/97  HR: 84  SpO2: 100 2 L NC  RR: 14  Temp: 97.1  No anesthesia care post op

## 2023-03-16 NOTE — OP NOTE
Preoperative diagnosis: 1.  Personal history of breast cancer  2.  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  Breast asymmetry    Postoperative diagnosis: 1.  Personal history of breast cancer  2.  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  Breast asymmetry    Surgeon: José Miguel Chatman MD    Assistant: Lyndsay BARRAZA who was essential in completion of this procedure in a timely and efficient fashion by assisting with retraction, suctioning, suturing, and placement of dressing    Anesthesia: General    Procedure: 1.  Left tissue expander exchange to a permanent silicone gel implant.  The silicone gel implant is a NatDashBurste Inspira style SSX volume 545 cc serial #[id number]  2.  Revision of left breast capsule with capsulorrhaphy and oversewing with a silk suture  3.  Right upper flank complex closure measuring 11 cm  4.  Left upper flank liposuction measuring 200 mL    Indication: The patient is a 47-year-old white female who recently underwent delayed left breast reconstruction with placement of a tissue expander and AlloDerm.  She is now ready for tissue expander exchange.  She also complains of a right upper flank dogear from her prior mastectomy and reconstruction as well as fullness of her left upper flank.  I recommended dogear excision as well as liposuction of her left upper flank.  All techniques, potential complications, and typical postoperative course were discussed the patient.  She indicated her understanding wish to proceed.    Findings: 1.  Absent bilateral breast and nipple areolar complex  2.  Lower left breast footprint new    Description: The patient was taken from preoperative holding to the operating room after informed consent was signed and on the chart and placed under general anesthesia successfully.  Prior to induction of anesthesia, the patient received a prophylactic dose of antibiotics and had bilateral lower extremity sequential compression devices in place and  operational.  She was placed supine on the operating room table.  She had a pillow placed beneath her knees.  Her arms are gently abducted to 90 degrees and she had ulnar nerve padding.  Her chest wall was prepped and draped in the usual sterile fashion.  After properly identifying the patient and the patient's problem, a left inframammary crease incision was marked with a marking pen.  The area was infiltrated with 1% lidocaine with 1-100,000 epinephrine.  A 10 blade was used to incise the skin.  The subcutaneous tissue was dissected with electrocautery beveling in a cephalad direction.  The AlloDerm was penetrated with electrocautery and a capsulotomy was extended medially and laterally.  The tissue expander was removed.  A 525 cc sizer was placed into the breast pocket via a Norman funnel.  The pocket was temporary closed with Vicryl suture and staples respectively.  The patient sat upright to check for symmetry.  The left side appeared slightly smaller.  A capsulorrhaphy was undertaken on the lateral and inferior lateral gutter of the breast pocket by pop [place] this area.  This was oversewn with a 2-0 silk suture in a simple running fashion.  A 545 cc sizer was placed in the breast pocket via a Norman funnel.  The AlloDerm and skin were then temporary closed with Vicryl suture and staples respectively.  This patient was sat upright to check for symmetry.  Symmetry was confirmed.  The patient was sat back down supine on the operating table.  The temporary closure and sizer was taken down and removed respectively.  The pocket was irrigated with an antibiotic and Betadine solution.  The AlloDerm was tagged with 3-0 Vicryl pop-off sutures.  My gloves were changed.  The planned implant was then inserted in the breast pocket via a Norman funnel and oriented and seated properly.  The Vicryl ties were then tied down.  My assistant then proceeded with closure of the left inframammary crease incision with a 3-0 Monocryl  suture in a deep dermal buried interrupted fashion and 3-0 STRATAFIX suture in an intracuticular fashion.  During that process, a dogear was marked for excision on the right upper flank.  This measured 11 cm.  The area was infiltrated with a lidocaine and epinephrine solution.  A 10 blade is used to incise the skin.  The subcutaneous tissue was dissected with electrocautery.  Hemostasis was achieved with electrocautery.  This area was closed in a similar fashion to the left inframammary crease incision.  Lastly, a stab incision was made in the left upper flank area.  Tumescent solution consisting of 1 L of LR, 1 ampoule of epinephrine, and 30 cc of 1% plain lidocaine was injected into the upper flank area.  Liposuction was then undertaken in that area.  200 cc of Lipo aspirate was harvested.  The site was equalized with the same cannula.  The access site was closed with 5-0 fast-absorbing plain gut suture.  Tissue glue was applied to all incisions.  The patient's chest wall was dressed with Kerlix fluffs and a surgical bra.  The case was turned over to anesthesia which point the patient was woken from general anesthesia successfully and taken to PACU in stable condition.  I was present for the entire procedure.  All counts were correct.    Estimated blood loss: Minimal    Drains: None    Complications: None immediate

## 2023-03-16 NOTE — ANESTHESIA PROCEDURE NOTES
Airway  Urgency: elective    Date/Time: 3/16/2023 7:38 AM  Airway not difficult    General Information and Staff    Patient location during procedure: OR  CRNA/CAA: Sheryl Mcclain CRNA    Indications and Patient Condition  Indications for airway management: airway protection    Preoxygenated: yes  MILS not maintained throughout  Mask difficulty assessment: 1 - vent by mask    Final Airway Details  Final airway type: endotracheal airway      Successful airway: ETT  Cuffed: yes   Successful intubation technique: direct laryngoscopy  Endotracheal tube insertion site: oral  Blade: Geovanna  Blade size: 3  ETT size (mm): 7.0  Cormack-Lehane Classification: grade I - full view of glottis  Placement verified by: chest auscultation and capnometry   Measured from: lips  ETT/EBT  to lips (cm): 20  Number of attempts at approach: 1  Assessment: lips, teeth, and gum same as pre-op and atraumatic intubation    Additional Comments  Negative epigastric sounds, Breath sound equal bilaterally with symmetric chest rise and fall

## 2023-03-16 NOTE — ANESTHESIA PREPROCEDURE EVALUATION
Anesthesia Evaluation     Patient summary reviewed and Nursing notes reviewed   no history of anesthetic complications:  NPO Solid Status: > 8 hours  NPO Liquid Status: > 2 hours           Airway   Mallampati: II  TM distance: >3 FB  Neck ROM: full  No difficulty expected  Dental - normal exam     Pulmonary     breath sounds clear to auscultation  (+) a smoker Former Abstained day of surgery,   Cardiovascular   Exercise tolerance: good (4-7 METS)    ECG reviewed  Rhythm: regular  Rate: normal        Neuro/Psych  (+) tremors, psychiatric history Anxiety and Bipolar,    (-) weakness  GI/Hepatic/Renal/Endo    (+)   liver disease history of elevated LFT,     Musculoskeletal     Abdominal   (-) obese    Abdomen: soft.   Substance History      OB/GYN          Other      history of cancer remission                    Anesthesia Plan    ASA 2     general     intravenous induction     Anesthetic plan, risks, benefits, and alternatives have been provided, discussed and informed consent has been obtained with: patient.    Plan discussed with CRNA.        CODE STATUS:

## 2023-03-16 NOTE — H&P
"Pre-Op H&P  Maryana Love  [id number]  1975    Chief complaint: \" I am here to have my left breast tissue expander exchanged to a permanent implant.\"    HPI:    Patient is a 47 y.o.female who presents with a history of breast cancer.  She presents today for scheduled surgery and anticipates a left breast tissue expander exchange to permanent implant-LEFT.  She reports a personal history of breast cancer 7 years ago.  She states that she had a the left breast implant removed in October 2021 due to an infection.  On 5/5/2022 she had surgery with Dr. Chatman for a left breast tissue expander.  She denies taking any anticoagulant or antiplatelet medications denies any changes since her most recent office visit.    Review of Systems:  General ROS: negative for chills, fever or skin lesions;  No changes since last office visit.  Neg for recent sick exposure  Cardiovascular ROS: no chest pain or dyspnea on exertion  Respiratory ROS: no cough, shortness of breath, or wheezing    Allergies: Denies allergy to latex or contrast dye.  Allergies   Allergen Reactions   • Nitrofurantoin Hives   • Abilify [Aripiprazole] Other (See Comments)     Tremors     • Chlorhexidine Rash       Home Meds:    No current facility-administered medications on file prior to encounter.     Current Outpatient Medications on File Prior to Encounter   Medication Sig Dispense Refill   • acamprosate (CAMPRAL) 333 MG EC tablet Take 1 tablet by mouth 2 (two) times a day.     • buPROPion XL (WELLBUTRIN XL) 150 MG 24 hr tablet Take 2 tablets by mouth Daily.     • lamoTRIgine (LaMICtal) 150 MG tablet Take 2 tablets by mouth Every Night. Take 2 tablets daily     • topiramate (TOPAMAX) 25 MG capsule (sprinkle) Take 1 capsule by mouth 2 (Two) Times a Day.     • naltrexone (DEPADE) 50 MG tablet Take 2 tablets by mouth Daily. Perioperative, taking daily oral instead of monthly IM         PMH:   Past Medical History:   Diagnosis Date   • Allergic rhinitis "    • Anxiety disorder     Description: Long-term.   • Bipolar affective disorder (HCC)     Dx 2014- [facility].   • Chronic urinary tract infection     Description: Diagnosed 2008.  Cystoscopy normal 1/10/13, other than mild urethral inflammation. Normal diagnostic cystourethroscopy with urodynamics at  4/15.   • Colon polyps     Dx 4/19- fragments of tubular adenoma (ascending colon)- [facility]   • COVID-19 virus infection 01/21/2022   • Cyst of ovary     Description: Left (2.5 cm) dx by CT Scan 9/6/13.   • Hearing loss    • Hemangioma of liver     Description: Diagnosed by CT scan 9/13   • History of alcohol abuse 05/2021    recent time in rehab -discharged 5/18/21; currently on medications (Vivitrol and Campral)   • History of varicella    • Malignant neoplasm of upper-outer quadrant of left female breast (HCC)     Description: dx 8/15- infiltrating and in situ low-grade ductal adenocarcinoma (Stage 2A, receptor +. HER2 neg)   • Open breast wound     right breast from surgery in dec 2020   • Tremor      PSH:    Past Surgical History:   Procedure Laterality Date   • ABDOMINOPLASTY     • AUGMENTATION MAMMAPLASTY     • BELPHAROPTOSIS REPAIR Bilateral approx 11/2017   • BLADDER SURGERY      6/2/15- part of mesh removed   • BREAST BIOPSY Left 08/2005    cancer   • BREAST IMPLANT SURGERY Right 06/23/2016    Procedure: RIGHT BREAST RECONSTRUCTION, REVISION;  Surgeon: José Miguel Chatman MD;  Location:  [facility];  Service:    • BREAST IMPLANT SURGERY Right 06/04/2021    Procedure: BREAST COMPLEX CLOSURE, TISSUE EXPANDER EXCHANGE RIGHT;  Surgeon: José Miguel Chatman MD;  Location:  [facility];  Service: Plastics;  Laterality: Right;   • BREAST IMPLANT SURGERY Left 10/05/2021    Procedure: BREAST IMPLANT REVISION AND REMOVAL;  Surgeon: José Miguel Chatman MD;  Location:  [facility];  Service: Plastics;  Laterality: Left;   • BREAST IMPLANT SURGERY Left 10/19/2021    Procedure: BREAST IMPLANT REMOVAL LEFT;  Surgeon: José Miguel Chatman  MD;  Location:  [facility];  Service: Plastics;  Laterality: Left;   • BREAST RECONSTRUCTION Left 05/05/2022    Procedure: BREAST RECONSTRUCTION DELAYED WITH TISSUE EXPANDER AND ALLODERM INSERTION LEFT, CHEST WALL CYST EXCISION RIGHT;  Surgeon: José Miguel Chatman MD;  Location:  [facility];  Service: Plastics;  Laterality: Left;   • BREAST RECONSTRUCTION, BREAST TISSUE EXPANDER INSERTION Bilateral 12/15/2020    Procedure: CONVERSION OF RECONSTRUCTED BREASTS TISSUE EXPANDER PLACEMENT BILATERAL;  Surgeon: José Miguel Chatman MD;  Location:  [facility];  Service: Plastics;  Laterality: Bilateral;   • BREAST RECONSTRUCTION, BREAST TISSUE EXPANDER REMOVAL, IMPLANT INSERTION Bilateral 09/23/2021    Procedure: BREAST TISSUE EXPANDERS TO PERMANENT IMPLANT INSERTION BILATERAL;  Surgeon: José Miguel Chatman MD;  Location:  [facility];  Service: Plastics;  Laterality: Bilateral;   • COLONOSCOPY     • EYE SURGERY Right approx 10/2017    Stye removal   • FAT GRAFTING Bilateral 09/29/2016    Procedure: BILATERAL BREAST FAT GRAFTING;  Surgeon: José Miguel Chatman MD;  Location:  [facility];  Service:    • GASTRIC SLEEVE LAPAROSCOPIC      12/29/15   • LASIK Bilateral approx 1/2018   • MASTECTOMY COMPLETE / SIMPLE W/ SENTINEL NODE BIOPSY Bilateral 10/05/2015    with reconstruction (sentinel node bx neg) Lymph nodes removed/Left   • NIPPLE RECONSTRUCTION Bilateral 09/29/2016    Procedure: BILATERAL NIPPLE/AEROLA RECONSTRUCTION WITH FLAP AND GRAFT;  Surgeon: José Miguel Chatman MD;  Location:  [facility];  Service:    • SINUS SURGERY  11/2012     endoscopic sinusotomies and polypectomies   • TOTAL ABDOMINAL HYSTERECTOMY  02/2008   • TUBAL ABDOMINAL LIGATION Bilateral 03/2008   • WISDOM TOOTH EXTRACTION         Immunization History:  Influenza: Yes  Pneumococcal: No  Tetanus: Yes    Social History:   Tobacco:   Social History     Tobacco Use   Smoking Status Former   • Packs/day: 0.25   • Years: 20.00   • Pack years: 5.00   • Types: Cigarettes, Electronic  "Cigarette   • Start date:    • Quit date: 2022   • Years since quittin.0   Smokeless Tobacco Never   Tobacco Comments    Quit intermittently for several years at a time; recently in alcohol rehab and restarted smoking but quit 21 and started  Vaping daily since 21      Alcohol:     Social History     Substance and Sexual Activity   Alcohol Use Yes    Comment: OCCASIONAL ALCOHOL       Vitals:           /72 (BP Location: Right arm, Patient Position: Lying)   Pulse 70   Temp 97.2 °F (36.2 °C) (Temporal)   Resp 16   Ht 165.1 cm (65\")   Wt 61.2 kg (135 lb)   LMP  (LMP Unknown) Comment: LAST MAMOGRAM 2015  SpO2 98%   BMI 22.47 kg/m²     Physical Exam:  General Appearance:    Alert, cooperative, no distress, appears stated age   Head:    Normocephalic, without obvious abnormality, atraumatic   Lungs:     Clear to auscultation bilaterally, respirations unlabored    Heart:   Regular rate and rhythm, S1 and S2 normal, no murmur, rub    or gallop    Abdomen:    Soft, nontender.  +bowel sounds   Breast Exam:    deferred   Genitalia:    deferred   Extremities:   Extremities normal, atraumatic, no cyanosis or edema   Skin:   Skin color, texture, turgor normal, no rashes or lesions   Neurologic:   Grossly intact   Results Review  LABS:  Lab Results   Component Value Date    WBC 8.48 2023    HGB 13.8 2023    HCT 41.5 2023    MCV 99.5 (H) 2023     2023    NEUTROABS 2.31 2022    GLUCOSE 86 2023    BUN 16 2023    CREATININE 0.62 2023    EGFRIFNONA 69 2021     2023    K 4.4 2023     2023    CO2 27.0 2023    CALCIUM 9.3 2023    ALBUMIN 4.10 2021    AST 21 2021    ALT 13 2021    BILITOT 0.4 2021       RADIOLOGY:  No radiology results for the last 3 days     I reviewed the patient's new clinical results.  CBC and CHEM profile from 3/9/2023 reviewed and available within " patient's chart.    Cancer Staging (if applicable)  Cancer Patient: __ yes __no __unknown; If yes, clinical stage T:__ N:__M:__, stage group or __N/A    Impression: Patient presents with a history of breast cancer.    Plan: Dr. Chatman will perform a left breast tissue expander exchange for permanent implant-LEFT.       Willy Christopher PA-C   03/16/23   7:08 AM EDT

## 2023-03-16 NOTE — BRIEF OP NOTE
BREAST RECONSTRUCTION, BREAST TISSUE EXPANDER REMOVAL, IMPLANT INSERTION  Progress Note    Maryana Love  3/16/2023    Pre-op Diagnosis:   * Acquired absence of breast and absent nipple, bilateral [Z90.13]     * Personal history of breast cancer [Z85.3]     * Breast asymmetry [N64.89]       Post-Op Diagnosis Codes:     * Acquired absence of breast and absent nipple, bilateral [Z90.13]     * Personal history of breast cancer [Z85.3]     * Breast asymmetry [N64.89]    Procedure/CPT® Codes:  KS REPLACEMENT TISSUE EXPANDER W/PERMANENT IMPLANT [18086]  KS REVISION JOEL-IMPLANT CAPSULE BREAST [24855]  KS REPAIR COMPLEX TRUNK 2.6-7.5 CM [97785]  KS REPAIR COMPLEX TRUNK EACH ADDITIONAL 5 CM/< [61573]  KS SUCTION ASSISTED LIPECTOMY TRUNK [49069]      Procedure(s):  LEFT BREAST TISSUE EXPANDER EXCHANGE TO PERMANENT IMPLANT        Surgeon(s):  José Miguel Chatman MD    Anesthesia: General    Staff:   Circulator: Albert Ingram RN  Physician Assistant: Angelina Kwan PA  Scrub Person: Charu Romero         Estimated Blood Loss: minimal    Urine Voided: * No values recorded between 3/16/2023  7:30 AM and 3/16/2023  8:30 AM *    Specimens:                None          Drains:   [REMOVED] Closed/Suction Drain 1 Lateral LUQ Bulb 15 Fr. (Removed)       [REMOVED] Closed/Suction Drain 1 Inferior;Lateral;Left Breast 15 Fr. (Removed)       [REMOVED] Closed/Suction Drain 1 Lateral Chest 15 Fr. (Removed)       Findings: absent breast with lower left footprint        Complications: none immediate          José Miguel Chatman MD     Date: 3/16/2023  Time: 08:51 EDT

## 2023-06-13 ENCOUNTER — TELEMEDICINE (OUTPATIENT)
Dept: INTERNAL MEDICINE | Facility: CLINIC | Age: 48
End: 2023-06-13
Payer: OTHER GOVERNMENT

## 2023-06-13 VITALS — BODY MASS INDEX: 24.13 KG/M2 | WEIGHT: 145 LBS

## 2023-06-13 DIAGNOSIS — J06.9 UPPER RESPIRATORY TRACT INFECTION, UNSPECIFIED TYPE: Primary | ICD-10-CM

## 2023-06-13 PROCEDURE — 99213 OFFICE O/P EST LOW 20 MIN: CPT | Performed by: INTERNAL MEDICINE

## 2023-06-13 RX ORDER — BUPROPION HYDROCHLORIDE 300 MG/1
300 TABLET ORAL EVERY MORNING
COMMUNITY

## 2023-06-13 RX ORDER — AMOXICILLIN 875 MG/1
875 TABLET, COATED ORAL 2 TIMES DAILY
Qty: 20 TABLET | Refills: 0 | Status: SHIPPED | OUTPATIENT
Start: 2023-06-13 | End: 2023-06-23

## 2023-06-13 RX ORDER — LAMOTRIGINE 100 MG/1
200 TABLET ORAL DAILY
COMMUNITY

## 2023-06-13 NOTE — PROGRESS NOTES
Subjective       Maryana Love is a 47 y.o. female.     Chief Complaint   Patient presents with    Sinus Problem       History obtained from the patient.    The patient has chosen to receive care through a Telehealth visit.  The patient consented to use a video/audio connection for her medical care today.    The patient presents during the COVID-19 pandemic/federally declared state of public health emergency.  This service was conducted via Telehealth audio/visual by I-phone.  Connected to the patient using [place]/Dapt  This visit occurred with the Provider located at [facility] (@ [place], [place]) and the patient located at home in the [facility].  Other participants in this Telehealth visit included: None.        URI   This is a new problem. Episode onset: 2-3 days ago. The problem has been unchanged. Maximum temperature: low grade. Associated symptoms include congestion, headaches, rhinorrhea (green), sinus pain and sneezing. Pertinent negatives include no abdominal pain, chest pain, coughing, diarrhea, ear pain, joint pain, nausea, neck pain, rash, sore throat, swollen glands, vomiting or wheezing. Treatments tried: Advil Cold and Sinus. The treatment provided moderate relief.      There is no known exposure to Influenza or COVID-19.  She has not done a home COVID test.    The following portions of the patient's history were reviewed and updated as appropriate: allergies, current medications, past family history, past medical history, past social history, past surgical history, and problem list.      Review of Systems   Constitutional:  Positive for fatigue and fever. Negative for chills.   HENT:  Positive for congestion, rhinorrhea (green), sinus pressure, sinus pain and sneezing. Negative for ear pain, postnasal drip and sore throat.    Eyes:  Positive for redness. Negative for discharge.   Respiratory:  Negative for cough, chest tightness,  shortness of breath and wheezing.    Cardiovascular:  Negative for chest pain.   Gastrointestinal:  Negative for abdominal pain, diarrhea, nausea and vomiting.   Musculoskeletal:  Negative for arthralgias, joint pain, myalgias, neck pain and neck stiffness.   Skin:  Negative for rash.   Neurological:  Positive for headaches.   Hematological:  Negative for adenopathy.         Objective     Weight 65.8 kg (145 lb), not currently breastfeeding.    Physical Exam  Vitals reviewed.   Constitutional:       Appearance: She is normal weight.   Pulmonary:      Effort: Pulmonary effort is normal. No respiratory distress.   Neurological:      Mental Status: She is alert.   Psychiatric:         Mood and Affect: Mood normal.         Assessment & Plan   Diagnoses and all orders for this visit:    1. Upper respiratory tract infection, unspecified type (Primary)  -     amoxicillin (AMOXIL) 875 MG tablet; Take 1 tablet by mouth 2 (Two) Times a Day for 10 days.  Dispense: 20 tablet; Refill: 0        Continue current over the counter medication, as Mucinex, and plenty of fluids.    Return if symptoms worsen or fail to improve.

## 2023-10-23 NOTE — PROGRESS NOTES
"Subjective     Carlos Sher is a 59 y.o. female who presents with Fever (X 3 days, sore throat, Neg COVID)            Fever   This is a new problem. Episode onset: 3 days. The problem occurs intermittently. The problem has been waxing and waning. The maximum temperature noted was 101 to 101.9 F. Associated symptoms include muscle aches and a sore throat. Pertinent negatives include no abdominal pain, chest pain, congestion, coughing, headaches, nausea, rash, sleepiness, vomiting or wheezing. She has tried nothing for the symptoms.   Risk factors: no recent travel and no sick contacts        Past Medical History:   Diagnosis Date    Anemia     Constipation 2008    Cough due to ACE inhibitor 7/12/2016    GERD (gastroesophageal reflux disease)     Seasonal allergies     Thyroid ca (HCC) 2010    Vitamin d deficiency 9/2/2011         Past Surgical History:   Procedure Laterality Date    THYROIDECTOMY TOTAL  4/12/10    Thyroid cancer, followed by radiation.         Family History   Problem Relation Age of Onset    Hypertension Mother         kidney damage         Patient has no known allergies.        Medications, Allergies, and current problem list reviewed today in Epic    Review of Systems   Constitutional:  Positive for chills, fever and malaise/fatigue.   HENT:  Positive for sore throat. Negative for congestion.    Respiratory:  Negative for cough, sputum production, shortness of breath and wheezing.    Cardiovascular:  Negative for chest pain and leg swelling.   Gastrointestinal:  Negative for abdominal pain, nausea and vomiting.   Musculoskeletal:  Positive for myalgias.   Skin:  Negative for rash.   Neurological:  Negative for headaches.     All other systems reviewed and are negative.            Objective     /60   Pulse 71   Temp 36.6 °C (97.8 °F)   Resp 16   Ht 1.549 m (5' 1\")   Wt 63.5 kg (140 lb)   SpO2 98%   BMI 26.45 kg/m²      Physical Exam  Constitutional:       General: She is not in acute " Subjective       Maryana Love is a 42 y.o. female.     Chief Complaint   Patient presents with   • Hypertension     bp 121/105       History obtained from the patient.      Hypertension   This is a new problem. The current episode started yesterday (she was seen at Dr. Johnston office and her blood pressure, automatic cuff, was 121/105). The problem has been resolved since onset. Condition status: No previous HTN diagnosis. Pertinent negatives include no anxiety, blurred vision, chest pain, headaches, malaise/fatigue, orthopnea, palpitations, peripheral edema, PND or shortness of breath.        The following portions of the patient's history were reviewed and updated as appropriate: allergies, current medications, past family history, past medical history, past social history, past surgical history and problem list.      Review of Systems   Constitutional: Negative for fatigue, malaise/fatigue and unexpected weight change.   Eyes: Negative for blurred vision and visual disturbance.   Respiratory: Negative for cough, shortness of breath and wheezing.    Cardiovascular: Negative for chest pain, palpitations, orthopnea, leg swelling and PND.        Has mild  MARIE (not new, does not exercise), but no  orthopnea or claudication.   Gastrointestinal: Negative for abdominal pain, blood in stool, constipation, diarrhea, nausea and vomiting.        Denies melena.   Endocrine: Negative for polydipsia and polyuria.   Musculoskeletal: Negative for arthralgias and myalgias.   Neurological: Negative for dizziness, syncope, light-headedness and headaches.        No memory issues.   Psychiatric/Behavioral: Negative for decreased concentration.           Objective     Blood pressure 104/70, pulse 72, temperature 97.7 °F (36.5 °C), temperature source Temporal Artery , resp. rate 20, weight 61 kg (134 lb 8 oz), not currently breastfeeding.    Physical Exam   Constitutional: She appears well-developed and well-nourished.   Neck: Carotid  bruit is not present.   Cardiovascular: Normal rate, regular rhythm and normal heart sounds.    No murmur heard.  Pulmonary/Chest: Effort normal and breath sounds normal.   Neurological: She is alert.   Psychiatric: She has a normal mood and affect.   Nursing note and vitals reviewed.        Assessment/Plan   Maryana was seen today for hypertension.    Diagnoses and all orders for this visit:    Elevated blood pressure reading    Resolved.  Most likely due to faulty blood pressure machine.           Return for Annual physical-fasting after 4/20/18.     distress.     Appearance: She is not ill-appearing.   HENT:      Head: Normocephalic and atraumatic.      Mouth/Throat:      Mouth: Mucous membranes are moist.      Pharynx: Posterior oropharyngeal erythema present. No oropharyngeal exudate.   Eyes:      Conjunctiva/sclera: Conjunctivae normal.   Cardiovascular:      Rate and Rhythm: Normal rate and regular rhythm.      Heart sounds: Normal heart sounds.   Pulmonary:      Breath sounds: Normal breath sounds. No wheezing, rhonchi or rales.   Lymphadenopathy:      Cervical: Cervical adenopathy present.   Skin:     General: Skin is warm and dry.      Findings: No rash.   Neurological:      General: No focal deficit present.      Mental Status: She is alert and oriented to person, place, and time.   Psychiatric:         Mood and Affect: Mood normal.         Behavior: Behavior normal.         Thought Content: Thought content normal.         Judgment: Judgment normal.                             Assessment & Plan        1. Strep pharyngitis    - POCT CEPHEID GROUP A STREP - PCR- positive   - amoxicillin (AMOXIL) 500 MG Cap; Take 1 Capsule by mouth 2 times a day for 10 days.  Dispense: 20 Capsule; Refill: 0        Differential diagnoses, Supportive care, and indications for immediate follow-up discussed with patient.   Pathogenesis of diagnosis discussed including typical length and natural progression.   Instructed to return to clinic or nearest emergency department for any change in condition, further concerns, or worsening of symptoms.      The patient demonstrated a good understanding and agreed with the treatment plan.      Alysia Dumont P.A.-C.

## 2024-03-20 ENCOUNTER — OFFICE VISIT (OUTPATIENT)
Dept: INTERNAL MEDICINE | Facility: CLINIC | Age: 49
End: 2024-03-20
Payer: OTHER GOVERNMENT

## 2024-03-20 VITALS
SYSTOLIC BLOOD PRESSURE: 116 MMHG | HEART RATE: 74 BPM | RESPIRATION RATE: 16 BRPM | WEIGHT: 124.25 LBS | TEMPERATURE: 98 F | BODY MASS INDEX: 20.68 KG/M2 | DIASTOLIC BLOOD PRESSURE: 72 MMHG

## 2024-03-20 DIAGNOSIS — J02.9 SORE THROAT: ICD-10-CM

## 2024-03-20 DIAGNOSIS — J06.9 UPPER RESPIRATORY TRACT INFECTION, UNSPECIFIED TYPE: Primary | ICD-10-CM

## 2024-03-20 LAB
EXPIRATION DATE: NORMAL
FLUAV AG NPH QL: NEGATIVE
FLUBV AG NPH QL: NEGATIVE
INTERNAL CONTROL: NORMAL
Lab: NORMAL
S PYO AG THROAT QL: NEGATIVE
SARS-COV-2 AG UPPER RESP QL IA.RAPID: NOT DETECTED

## 2024-03-20 RX ORDER — CETIRIZINE HYDROCHLORIDE 10 MG/1
10 TABLET ORAL DAILY
COMMUNITY

## 2024-03-20 RX ORDER — NALTREXONE 380 MG
380 KIT INTRAMUSCULAR
COMMUNITY

## 2024-03-20 RX ORDER — BUPROPION HYDROCHLORIDE 150 MG/1
150 TABLET, EXTENDED RELEASE ORAL 2 TIMES DAILY
COMMUNITY

## 2024-03-20 RX ORDER — TRAZODONE HYDROCHLORIDE 150 MG/1
150 TABLET ORAL NIGHTLY
COMMUNITY

## 2024-03-20 RX ORDER — OLANZAPINE 5 MG/1
5 TABLET ORAL NIGHTLY
COMMUNITY

## 2024-03-20 RX ORDER — GABAPENTIN 100 MG/1
100 CAPSULE ORAL 2 TIMES DAILY
COMMUNITY

## 2024-03-20 RX ORDER — HYDROXYZINE HYDROCHLORIDE 25 MG/1
25 TABLET, FILM COATED ORAL 3 TIMES DAILY PRN
COMMUNITY

## 2024-03-20 RX ORDER — CEFDINIR 300 MG/1
300 CAPSULE ORAL 2 TIMES DAILY
Qty: 20 CAPSULE | Refills: 0 | Status: SHIPPED | OUTPATIENT
Start: 2024-03-20 | End: 2024-03-30

## 2024-03-20 NOTE — PROGRESS NOTES
Subjective       Maryana Love is a 48 y.o. female.     Chief Complaint   Patient presents with   • Sore Throat   • Earache       History obtained from the patient.      Sore Throat   This is a new problem. Episode onset: 1 week ago. The problem has been worse. There has been no fever. The pain is moderate. Associated symptoms include congestion, coughing (productive of yellow green sputum), ear discharge (waxy), ear pain, headaches, neck pain and swollen glands (right anterior neck). Pertinent negatives include no abdominal pain, diarrhea, shortness of breath or vomiting. She has had exposure to None. Treatments tried: Mucinex and Advil Cold and Sinus. The treatment provided moderate relief.   Earache   There is pain in both ears. This is a new problem. Episode onset: 2-3 days ago. The problem occurs constantly. The problem has been unchanged. There has been no fever. The pain is mild (Right side only, but both ears feel full). Associated symptoms include coughing (productive of yellow green sputum), ear discharge (waxy), headaches, neck pain, rhinorrhea (clear) and a sore throat. Pertinent negatives include no abdominal pain, diarrhea, rash or vomiting. She has tried nothing for the symptoms. The treatment provided significant (Advil) relief. There is no history of a chronic ear infection.        The following portions of the patient's history were reviewed and updated as appropriate: allergies, current medications, past family history, past medical history, past social history, past surgical history, and problem list.      Review of Systems   Constitutional:  Negative for chills, fatigue and fever.   HENT:  Positive for congestion, ear discharge (waxy), ear pain, postnasal drip, rhinorrhea (clear), sinus pressure, sinus pain, sneezing and sore throat.    Respiratory:  Positive for cough (productive of yellow green sputum) and chest tightness. Negative for shortness of breath and wheezing.     Cardiovascular:  Negative for chest pain.   Gastrointestinal:  Negative for abdominal pain, diarrhea, nausea and vomiting.   Musculoskeletal:  Positive for arthralgias (chronic) and neck pain. Negative for joint swelling, myalgias and neck stiffness.   Skin:  Negative for rash.   Neurological:  Positive for dizziness and headaches.   Hematological:  Positive for adenopathy.           Objective     Blood pressure 116/72, pulse 74, temperature 98 °F (36.7 °C), temperature source Infrared, resp. rate 16, weight 56.4 kg (124 lb 4 oz), not currently breastfeeding.    Physical Exam  Vitals and nursing note reviewed.   Constitutional:       Appearance: She is well-developed and normal weight.   HENT:      Head: Normocephalic and atraumatic.      Comments: No maxillary or frontal sinus tenderness to palpation.     Right Ear: Ear canal and external ear normal. Tympanic membrane is not erythematous (but dull).      Left Ear: Ear canal and external ear normal. Tympanic membrane is not erythematous (but dull).      Mouth/Throat:      Mouth: Mucous membranes are moist. No oral lesions.      Pharynx: Oropharynx is clear.      Comments: Tonsils normal.  Eyes:      Conjunctiva/sclera: Conjunctivae normal.   Cardiovascular:      Rate and Rhythm: Normal rate and regular rhythm.      Heart sounds: Normal heart sounds. No murmur heard.  Pulmonary:      Effort: Pulmonary effort is normal.      Breath sounds: Normal breath sounds.   Musculoskeletal:      Cervical back: Normal range of motion and neck supple.   Lymphadenopathy:      Cervical: Cervical adenopathy (solitary slightly enlarged mildly tender AC LN) present.   Skin:     Findings: No rash.   Neurological:      Mental Status: She is alert.   Psychiatric:         Mood and Affect: Mood normal.     Results for orders placed or performed in visit on 03/20/24   POC Rapid Strep A    Specimen: Swab   Result Value Ref Range    Rapid Strep A Screen Negative Negative, VALID, INVALID, Not  Performed    Internal Control Passed Passed    Lot Number #[id number]     Expiration Date 7/9/25    POC Influenza A / B    Specimen: Swab   Result Value Ref Range    Rapid Influenza A Ag Negative Negative    Rapid Influenza B Ag Negative Negative    Internal Control Passed Passed    Lot Number [id number]     Expiration Date 7/3/24    POCT SARS-CoV-2 Antigen    Specimen: Nasopharynx; Swab   Result Value Ref Range    SARS Antigen Not Detected Not Detected, Presumptive Negative    Internal Control Passed Passed    Lot Number [id number]     Expiration Date 7/3/24            Assessment & Plan   Diagnoses and all orders for this visit:    1. Upper respiratory tract infection, unspecified type (Primary)  -     cefdinir (OMNICEF) 300 MG capsule; Take 1 capsule by mouth 2 (Two) Times a Day for 10 days.  Dispense: 20 capsule; Refill: 0   Continue current over the counter medication, and plenty of fluids.    2. Sore throat  -     POC Rapid Strep A  -     POC Influenza A / B  -     POCT SARS-CoV-2 Antigen      Return for Annual physical, fasting.

## 2024-06-14 ENCOUNTER — TELEPHONE (OUTPATIENT)
Dept: INTERNAL MEDICINE | Facility: CLINIC | Age: 49
End: 2024-06-14
Payer: OTHER GOVERNMENT

## 2024-06-14 NOTE — TELEPHONE ENCOUNTER
Caller: Maryana Love    Relationship: Self    Best call back number: [phone]    What was the call regarding: PATIENT CALLED ASKING IF DR POTTS CAN SEE HER TODAY FOR AN APPOINTMENT.  PATIENT STATED THAT SHE HAS FREQUENT, PAINFUL URINATION.

## 2024-06-14 NOTE — TELEPHONE ENCOUNTER
Called patient and notified that none of the providers in clinic have availability for an appointment today. I recommended that she be seen in urgent care. Good verb given.

## 2024-06-24 ENCOUNTER — OFFICE VISIT (OUTPATIENT)
Dept: INTERNAL MEDICINE | Facility: CLINIC | Age: 49
End: 2024-06-24
Payer: OTHER GOVERNMENT

## 2024-06-24 VITALS
DIASTOLIC BLOOD PRESSURE: 68 MMHG | HEART RATE: 72 BPM | WEIGHT: 109.38 LBS | HEIGHT: 64 IN | SYSTOLIC BLOOD PRESSURE: 110 MMHG | TEMPERATURE: 98.4 F | RESPIRATION RATE: 16 BRPM | BODY MASS INDEX: 18.67 KG/M2

## 2024-06-24 DIAGNOSIS — Z13.6 SCREENING FOR CARDIOVASCULAR CONDITION: ICD-10-CM

## 2024-06-24 DIAGNOSIS — R35.0 URINARY FREQUENCY: ICD-10-CM

## 2024-06-24 DIAGNOSIS — Z12.11 SCREENING FOR COLON CANCER: ICD-10-CM

## 2024-06-24 DIAGNOSIS — F32.A DEPRESSION, UNSPECIFIED DEPRESSION TYPE: ICD-10-CM

## 2024-06-24 DIAGNOSIS — R53.82 CHRONIC FATIGUE: ICD-10-CM

## 2024-06-24 DIAGNOSIS — F41.1 GENERALIZED ANXIETY DISORDER: ICD-10-CM

## 2024-06-24 DIAGNOSIS — S70.361A TICK BITE OF RIGHT THIGH, INITIAL ENCOUNTER: ICD-10-CM
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
## 2024-06-24 DIAGNOSIS — N39.0 ACUTE UTI: ICD-10-CM

## 2024-06-24 DIAGNOSIS — K63.5 POLYP OF COLON, UNSPECIFIED PART OF COLON, UNSPECIFIED TYPE: ICD-10-CM

## 2024-06-24 DIAGNOSIS — R25.1 TREMOR: ICD-10-CM

## 2024-06-24 DIAGNOSIS — E55.9 VITAMIN D DEFICIENCY: ICD-10-CM

## 2024-06-24 DIAGNOSIS — Z00.00 ENCOUNTER FOR HEALTH MAINTENANCE EXAMINATION IN ADULT: Primary | ICD-10-CM

## 2024-06-24 PROCEDURE — 99396 PREV VISIT EST AGE 40-64: CPT | Performed by: INTERNAL MEDICINE

## 2024-06-24 PROCEDURE — 87086 URINE CULTURE/COLONY COUNT: CPT | Performed by: INTERNAL MEDICINE

## 2024-06-24 RX ORDER — CEFDINIR 300 MG/1
300 CAPSULE ORAL 2 TIMES DAILY
Qty: 14 CAPSULE | Refills: 0 | Status: SHIPPED | OUTPATIENT
Start: 2024-06-24 | End: 2024-07-01

## 2024-06-24 RX ORDER — GABAPENTIN 100 MG/1
100 CAPSULE ORAL 2 TIMES DAILY
Qty: 60 CAPSULE | Refills: 5 | Status: SHIPPED | OUTPATIENT
Start: 2024-06-24

## 2024-06-24 RX ORDER — UBIDECARENONE 100 MG
100 CAPSULE ORAL DAILY
COMMUNITY

## 2024-06-24 NOTE — PATIENT INSTRUCTIONS
Please return for fasting labs.    I recommend COVID-19 bivalent vaccine at the pharmacy, Fall 2024, as we discussed.  I also recommend a yearly Influenza vaccine either in our office or at the pharmacy.    Health Maintenance, Female  Adopting a healthy lifestyle and getting preventive care are important in promoting health and wellness. Ask your health care provider about:  The right schedule for you to have regular tests and exams.  Things you can do on your own to prevent diseases and keep yourself healthy.  What should I know about diet, weight, and exercise?  Eat a healthy diet    Eat a diet that includes plenty of vegetables, fruits, low-fat dairy products, and lean protein.  Do not eat a lot of foods that are high in solid fats, added sugars, or sodium.  Maintain a healthy weight  Body mass index (BMI) is used to identify weight problems. It estimates body fat based on height and weight. Your health care provider can help determine your BMI and help you achieve or maintain a healthy weight.  Get regular exercise  Get regular exercise. This is one of the most important things you can do for your health. Most adults should:  Exercise for at least 150 minutes each week. The exercise should increase your heart rate and make you sweat (moderate-intensity exercise).  Do strengthening exercises at least twice a week. This is in addition to the moderate-intensity exercise.  Spend less time sitting. Even light physical activity can be beneficial.  Watch cholesterol and blood lipids  Have your blood tested for lipids and cholesterol at 20 years of age, then have this test every 5 years.  Have your cholesterol levels checked more often if:  Your lipid or cholesterol levels are high.  You are older than 40 years of age.  You are at high risk for heart disease.  What should I know about cancer screening?  Depending on your health history and family history, you may need to have cancer screening at various ages. This may  include screening for:  Breast cancer.  Cervical cancer.  Colorectal cancer.  Skin cancer.  Lung cancer.  What should I know about heart disease, diabetes, and high blood pressure?  Blood pressure and heart disease  High blood pressure causes heart disease and increases the risk of stroke. This is more likely to develop in people who have high blood pressure readings or are overweight.  Have your blood pressure checked:  Every 3-5 years if you are 18-39 years of age.  Every year if you are 40 years old or older.  Diabetes  Have regular diabetes screenings. This checks your fasting blood sugar level. Have the screening done:  Once every three years after age 40 if you are at a normal weight and have a low risk for diabetes.  More often and at a younger age if you are overweight or have a high risk for diabetes.  What should I know about preventing infection?  Hepatitis B  If you have a higher risk for hepatitis B, you should be screened for this virus. Talk with your health care provider to find out if you are at risk for hepatitis B infection.  Hepatitis C  Testing is recommended for:  Everyone born from 1945 through 1965.  Anyone with known risk factors for hepatitis C.  Sexually transmitted infections (STIs)  Get screened for STIs, including gonorrhea and chlamydia, if:  You are sexually active and are younger than 24 years of age.  You are older than 24 years of age and your health care provider tells you that you are at risk for this type of infection.  Your sexual activity has changed since you were last screened, and you are at increased risk for chlamydia or gonorrhea. Ask your health care provider if you are at risk.  Ask your health care provider about whether you are at high risk for HIV. Your health care provider may recommend a prescription medicine to help prevent HIV infection. If you choose to take medicine to prevent HIV, you should first get tested for HIV. You should then be tested every 3 months  for as long as you are taking the medicine.  Pregnancy  If you are about to stop having your period (premenopausal) and you may become pregnant, seek counseling before you get pregnant.  Take 400 to 800 micrograms (mcg) of folic acid every day if you become pregnant.  Ask for birth control (contraception) if you want to prevent pregnancy.  Osteoporosis and menopause  Osteoporosis is a disease in which the bones lose minerals and strength with aging. This can result in bone fractures. If you are 65 years old or older, or if you are at risk for osteoporosis and fractures, ask your health care provider if you should:  Be screened for bone loss.  Take a calcium or vitamin D supplement to lower your risk of fractures.  Be given hormone replacement therapy (HRT) to treat symptoms of menopause.  Follow these instructions at home:  Alcohol use  Do not drink alcohol if:  Your health care provider tells you not to drink.  You are pregnant, may be pregnant, or are planning to become pregnant.  If you drink alcohol:  Limit how much you have to:  0-1 drink a day.  Know how much alcohol is in your drink. In the [place] one drink equals one 12 oz bottle of beer (355 mL), one 5 oz glass of wine (148 mL), or one 1½ oz glass of hard liquor (44 mL).  Lifestyle  Do not use any products that contain nicotine or tobacco. These products include cigarettes, chewing tobacco, and vaping devices, such as e-cigarettes. If you need help quitting, ask your health care provider.  Do not use street drugs.  Do not share needles.  Ask your health care provider for help if you need support or information about quitting drugs.  General instructions  Schedule regular health, dental, and eye exams.  Stay current with your vaccines.  Tell your health care provider if:  You often feel depressed.  You have ever been abused or do not feel safe at home.  Summary  Adopting a healthy lifestyle and getting preventive care are important in promoting health and  wellness.  Follow your health care provider's instructions about healthy diet, exercising, and getting tested or screened for diseases.  Follow your health care provider's instructions on monitoring your cholesterol and blood pressure.  This information is not intended to replace advice given to you by your health care provider. Make sure you discuss any questions you have with your health care provider.  Document Revised: 05/09/2022 Document Reviewed: 05/09/2022  [employer] Patient Education © 2024 [employer]      Electronic Cigarette Information    Electronic cigarettes, or e-cigarettes, are battery-operated devices that deliver nicotine--a very addictive drug--to the body. They come in many shapes, including in the shape of a cigarette, pipe, pen, and even a USB memory stick. Electronic cigarettes may be called e-cigs, e-hookahs, vape pens, and electronic nicotine delivery systems (ENDS).  E-cigarettes have a cartridge that contains a liquid form of nicotine. When a person uses the device, the liquid heats up. It then becomes a vapor that a person inhales. Using e-cigarettes may be called vaping.  Nicotine is thought to increase your risk for certain types of cancer. In addition to nicotine, e-cigarettes may contain other harmful and cancer-causing chemicals, including:  Formaldehyde.  Acetaldehyde.  Heavy metals.  Ultrafine particles that can get inhaled deep into the lungs.  Chemical colorings and flavorings.  It is not clear how much nicotine you get when vaping, and it is hard to know what chemicals are in the vaping liquids. The health effects of vaping are not completely known, but you should be aware of the possible dangers of using these products.  Some people may use e-cigarettes to quit smoking tobacco. However, this has not been proven to work, and the Food and Drug Administration (FDA) has not approved e-cigarettes for this purpose.  How can using electronic cigarettes affect me?  You may be at risk  for developing a dangerous lung disease. There are reports of an increasing number of cases involving serious lung problems, and even death, associated with e-cigarette use. Your risk may be even higher if you:  Buy e-cigarettes or vaping oils off the street.  Add any substances to the e-cigarettes that are not intended by the .  Vaping may make you crave nicotine. Nicotine does the following:  Changes your blood sugar levels.  Increases your heart rate, blood pressure, and breathing rate.  Increases your risk of developing blood clots (hypercoagulable state) and diabetes.  Increases your risk of gum disease that may lead to losing teeth.  If you smoke e-cigarettes, you may be more likely to start smoking or to smoke more tobacco cigarettes.  Becoming addicted to nicotine may make your brain more sensitive to other addictive drugs. You may move to other addictive substances.  You may be in danger of overdosing on nicotine. Nicotine poisoning can cause nausea, vomiting, seizures, and trouble breathing.  Vaping has also been linked to decreases in memory and attention span in children and teens.  If you are pregnant, the nicotine in e-cigarettes may be harmful to your baby. Nicotine can cause:  Brain or lung problems for your baby.  Your baby to be born too early.  Your baby to be born with a low birth weight.  What actions can I take to stop vaping?  If you can, stop vaping on your own before you become addicted to nicotine. If you need help quitting, ask your health care provider. There are three effective ways to fight nicotine addiction:  Nicotine replacement therapy. Using nicotine gum or a nicotine patch blocks your craving for nicotine. Over time, you can reduce the amount of nicotine you use until you can stop using nicotine completely without having cravings.  Prescription medicines approved to fight nicotine addiction. These stop nicotine cravings or block the effects of nicotine.  Behavioral  therapy. This may include:  A self-help smoking cessation program.  Individual or group therapy.  A smoking cessation support group.  There are several national programs to help you quit smoking or vaping. These include:  Text message programs, such as SmokefreeTXT.  Apps for mobile phones, including the free [employer] edwin.  Hotlines, such as 1-800-QUIT-NOW (1-772.579.7059).  Where to find support  You can get support at these sites:  [place] Department of Health and Human Services: smokefree.gov  American Lung Association: www.lung.org  Where to find more information  Learn more about e-cigarettes from:  National [place] on Drug Abuse: www.drugabuse.gov  Centers for Disease Control and Prevention: www.cdc.gov  Summary  E-cigarettes can cause nicotine addiction.  E-cigarettes are not approved as a way to stop smoking. They are not a risk-free alternative to smoking tobacco.  There are reports of an increasing number of cases involving serious lung problems, and even death, associated with e-cigarette use.  If you can stop vaping on your own, do it before you become addicted to nicotine. If you need help quitting, ask your health care provider.  There are various methods and programs that can help you stop smoking or vaping.  This information is not intended to replace advice given to you by your health care provider. Make sure you discuss any questions you have with your health care provider.  Document Revised: 09/14/2022 Document Reviewed: 09/14/2022  [employer] Patient Education © 2024 [employer]

## 2024-06-24 NOTE — PROGRESS NOTES
Subjective     Chief Complaint:  Physical Exam.    History of Present Illness    History obtained from the patient.    The patient is being seen at [facility] for weight loss.  She states she initially went there several months ago for symptoms consisting of fatigue, weight gain, and hair loss.  She states she had multiple labs done and was told that she had low Estrogen and Testosterone levels.  She states they want to put her on hormone replacement pellets.  She was also told her Vitamin D level was low.  She has been put on multiple vitamin supplements (Thiacin, Omega-3, Co-Q10, ADK 10, Vitamin D, Jyoti, Elimipure, 1 juice supplement, and 1 vegetable supplement).  She states she has not really noticed a difference in her symptoms.  Her weight has decreased 50 pounds since her last PE here on 7/6/2020.  She states she is now having trouble gaining some of the weight back.  She has not noticed a difference in her initial presenting symptoms since starting the program.    The patient reports UTI symptoms x 1 to 2 weeks.  She has urinary frequency, urinary urgency, dysuria, and incomplete emptying.  She denies hematuria, pelvic pain, flank pain, vaginal discharge, fever, and chills.  She is taking Pyridium.    Patient is complaining of sinus symptoms for the past 2 to 3 weeks.  She reports congestion, clear rhinorrhea, headache, left ear pain, and sinus pain/pressure.  There is no fever or chills.  She denies sore throat.  She is on Zyrtec daily and as added Advil Cold and Sinus as needed.  She has not done a home COVID test.    The patient reports dizzy spells for 2 to 3 months, usually when she stands up.  They last several seconds to several minutes.    The patient states she got a tick bite yesterday on her right thigh.  She has not had a fever.  There is no rash or joint pain.    The patient has a history of left Breast Cancer.  She had a bilateral mastectomy on 10/5/2015.  She has had  multiple bilateral breast reconstruction surgeries, last 3/16/2023.  She has seen Dr. Lopez, last visit 3/31/2022.  She is to follow-up as needed.     The patient sees Dr. Zhong for Depression, Anxiety, and Bipolar Disorder.  She states she was hospitalized for 2.5 months for Bipolar Disorder.  She was discharged in March 2024 and states her symptoms have been stable since then.  She is currently on Wellbutrin XL, Lamictal, and Vraylar.  She states she was hospitalized she takes Hydroxyzine as needed.  She is on Trazodone for sleep.  She states Dr. Zhong is concerned about her weight loss.    The patient reports worsening of her Chronic Hand Tremors.  She was put on Gabapentin and Topamax during the above hospitalization.  She feels like the Gabapentin completely controlled her symptoms.  However, she states Dr. Zhong will not fill the Gabapentin and she has been out since her hospital discharge.  He recommended she see Neurology.  Of note, on 4/22/2021, T4, TSH, and Vitamin B12 level normal.    She is here for follow-up of Chronic Low Back Pain, which has resolved.     Colon Polyp Follow-Up: The patient is here for follow-up of Colon Polyps newly diagnosed in April 2019.    Interval Events: Colonoscopy on 4/2/2019 showed 1 polyp, path consistent with tubular adenoma.    Symptoms: She reports her chronic constipation is resolved.  She has had abdominal cramping and diarrhea x 2 to 3 weeks.  She reports nausea, but no vomiting.  She states everything tastes bitter, but that has been occurring for 1 to 2 months.  Denies melena and hematochezia.    Medication: None.       Vitamin D Deficiency Follow-up: The patient is here for follow-up of Vitamin D Deficiency, which is stable  Interval events: On 4/22/2021, Vitamin D level was 21.0.  Symptoms: Reports fatigue and has occasional balance issues.  Denies myalgias, arthralgias, paresthesias, and gait instability.   Medication: Multivitamin and Vitamin D3 daily.    Maryana  Estefanía Love is a 48 y.o. female who presents for an Annual Physical.  She is non-fasting.      PMH, PSH, SocHx, FamHx, Allergies, and Medications: Reviewed and updated.    Outpatient Medications Prior to Visit   Medication Sig Dispense Refill    acamprosate (CAMPRAL) 333 MG EC tablet Take 1 tablet by mouth 2 (two) times a day.      Bioflavonoid Products (YUSUF VITAMIN C-FLAVONOIDS PO) Take  by mouth.      buPROPion SR (WELLBUTRIN SR) 150 MG 12 hr tablet Take 1 tablet by mouth 2 (Two) Times a Day.      Cariprazine HCl (Vraylar) 1.5 MG capsule capsule Take 1 capsule by mouth Daily.      cetirizine (zyrTEC) 10 MG tablet Take 1 tablet by mouth Daily.      coenzyme Q10 100 MG capsule Take 1 capsule by mouth Daily.      hydrOXYzine (ATARAX) 25 MG tablet Take 1 tablet by mouth 3 (Three) Times a Day As Needed for Itching.      lamoTRIgine (LaMICtal) 100 MG tablet Take 2 tablets by mouth Daily.      MISC NATURAL PRODUCTS PO Take  by mouth. Jyoti- Gut Relief, Daily Digestive Supplement      MISC NATURAL PRODUCTS PO Take  by mouth. Biote- ADK 10 Hormone Therapy Supplement      MISC NATURAL PRODUCTS PO Take  by mouth. Nutrafol - Hair Growth Vitamin      MISC NATURAL PRODUCTS PO Take  by mouth. Juice Plus Supplement      MISC NATURAL PRODUCTS PO Take  by mouth. Vegetable Blend Powder Supplement      topiramate (TOPAMAX) 25 MG capsule (sprinkle) Take 1 capsule by mouth 2 (Two) Times a Day.      traZODone (DESYREL) 150 MG tablet Take 1 tablet by mouth Every Night.      gabapentin (NEURONTIN) 100 MG capsule Take 1 capsule by mouth 2 (Two) Times a Day. (Patient not taking: Reported on 6/24/2024)      Naltrexone (Vivitrol) 380 MG reconstituted suspension IM suspension Inject 380 mg into the appropriate muscle as directed by prescriber. (Patient not taking: Reported on 6/24/2024)      OLANZapine (zyPREXA) 5 MG tablet Take 1 tablet by mouth Every Night. (Patient not taking: Reported on 6/24/2024)       No facility-administered  medications prior to visit.       Immunization History   Administered Date(s) Administered    COVID-19 (MODERNA) 1st,2nd,3rd Dose Monovalent 2021, 2021, 10/07/2021    Fluzone (or Fluarix & Flulaval for VFC) >6mos 2019, 2020    Influenza, Unspecified 2020    Tdap 2014, 2022         Patient Active Problem List   Diagnosis    Allergic rhinitis    Anxiety disorder    Malignant neoplasm of upper-outer quadrant of left female breast    Chronic urinary tract infection    Depression    Hearing loss    Edema    Hemangioma of liver    Cyst of ovary    Vitamin D deficiency    Bipolar affective disorder    Tremor    Tobacco use    Colon polyps    Internal hemorrhoids    Urinary incontinence    History of pubovaginal sling    Chronic constipation    Microscopic hematuria       Health Habits:  Dental Exam. up to date  Eye Exam. not up to date - last visit a couple years ago  Hearing Loss:  No  Exercise: 7 times/week.  Current exercise activities include: housecleaning and yard work  Diet: Unhealthy- low protein  Multivitamin: Yes    Safe Driving:  Yes  Seat Belt:  Yes  Bike Helmet:  N/A  Skin Screening:  Yes  Sunscreen: Yes  SBE / VANESSA: N/A  Sexual Activity:  Yes  Birth Control:  STACI  STD Prevention:  N/A    Last Pap: N/A (STACI)  Last Mammogram:  N/A (Bilateral mastectomy)  Last DEXA Scan: N/A  Last Colonoscopy: 2019 showed 1 polyp, path consistent with tubular adenoma.  Last PSA: N/A    Social:    Social History     Socioeconomic History    Marital status:     Number of children: 3   Tobacco Use    Smoking status: Former     Current packs/day: 0.00     Average packs/day: 0.3 packs/day for 29.1 years (7.3 ttl pk-yrs)     Types: Cigarettes, Electronic Cigarette     Start date:      Quit date: 2022     Years since quittin.3     Passive exposure: Never    Smokeless tobacco: Never    Tobacco comments:     Quit intermittently for several years at a time; recently in  alcohol rehab and restarted smoking but quit 5/21/21 and started  Vaping daily since 5/18/21   Vaping Use    Vaping status: Every Day    Start date: 1/1/2018   Substance and Sexual Activity    Alcohol use: Yes     Comment: 1-2 seltzers( 1-2 times per month0    Drug use: Yes     Types: Marijuana     Comment: edible once per month, usually Delta 8    Sexual activity: Yes     Partners: Male     Birth control/protection: Surgical         Current Medical Providers:    Annabelle Camargo MD (Internal Medicine / Pediatrics)    The [facility] providers who are involved in the care of this patient are listed above.         Review of Systems   Constitutional:  Positive for appetite change (doesn't want to eat due to bitter taste), fatigue and unexpected weight change. Negative for chills and fever.        No night sweats.    HENT:  Positive for ear pain, postnasal drip, rhinorrhea, sinus pressure and sinus pain. Negative for congestion, ear discharge, facial swelling, hearing loss, nosebleeds, sneezing, sore throat, tinnitus and voice change.         Denies snoring.   Eyes:  Positive for photophobia (not new). Negative for pain, discharge, redness, itching and visual disturbance.   Respiratory:  Negative for cough, chest tightness, shortness of breath and wheezing.         No chest congestion.  No hemoptysis.   Cardiovascular:  Negative for chest pain, palpitations and leg swelling.        No orthopnea, MARIE, or PND.  No claudication or syncope.   Gastrointestinal:  Positive for abdominal pain, diarrhea and nausea. Negative for blood in stool, constipation, rectal pain and vomiting.        No melena.  No hematemesis.  No heartburn, dysphagia or odynophagia.  No early satiety, belching, or bloating.    Endocrine: Positive for cold intolerance. Negative for heat intolerance, polydipsia, polyphagia and polyuria.        Has hair loss and dry skin.  No hot flashes.     Genitourinary:  Positive for dysuria and frequency. Negative  "for difficulty urinating, dyspareunia, flank pain, hematuria, pelvic pain, urgency, vaginal bleeding and vaginal discharge.        Has nocturia, incomplete emptying,and some incontinence.   Musculoskeletal:  Negative for arthralgias, back pain, gait problem, joint swelling, myalgias, neck pain and neck stiffness.        No joint stiffness.   Skin:  Negative for rash.        No new skin lesions or changes in skin lesions.    Neurological:  Positive for dizziness, tremors, speech difficulty (stuttering over words) and light-headedness. Negative for syncope, weakness, numbness and headaches.        No tingling.  No memory loss.  No decreased concentration.   Hematological:  Negative for adenopathy. Does not bruise/bleed easily.   Psychiatric/Behavioral:  Positive for sleep disturbance (not new). Negative for confusion, self-injury and suicidal ideas. The patient is nervous/anxious.         Stable depression.           Objective     Vitals:    06/24/24 1402   BP: 110/68   BP Location: Right arm   Patient Position: Sitting   Cuff Size: Adult   Pulse: 72   Resp: 16   Temp: 98.4 °F (36.9 °C)   TempSrc: Infrared   Weight: 49.6 kg (109 lb 6 oz)   Height: 163 cm (64.17\")   PainSc: 0-No pain       Body mass index is 18.67 kg/m².    Physical Exam  Vitals and nursing note reviewed.   Constitutional:       Appearance: Normal appearance. She is well-developed and normal weight.   HENT:      Head: Normocephalic and atraumatic.      Right Ear: Tympanic membrane, ear canal and external ear normal.      Left Ear: Tympanic membrane, ear canal and external ear normal.      Mouth/Throat:      Mouth: Mucous membranes are moist. No oral lesions.      Pharynx: Oropharynx is clear.      Comments: Tonsils normal.  Eyes:      Extraocular Movements: Extraocular movements intact.      Conjunctiva/sclera: Conjunctivae normal.      Pupils: Pupils are equal, round, and reactive to light.      Comments: Fundi normal bilaterally.   Neck:      " Thyroid: No thyromegaly.      Vascular: No carotid bruit.   Cardiovascular:      Rate and Rhythm: Normal rate and regular rhythm.      Pulses: Normal pulses.      Heart sounds: Normal heart sounds. No murmur heard.     No friction rub. No gallop.   Pulmonary:      Effort: Pulmonary effort is normal.      Breath sounds: Normal breath sounds.      Comments: Breast exam deferred (bilateral mastectomy)  Abdominal:      General: Bowel sounds are normal. There is no distension or abdominal bruit.      Palpations: Abdomen is soft. There is no hepatomegaly, splenomegaly or mass.      Tenderness: There is no abdominal tenderness.   Genitourinary:     Comments:  and rectal exam deferred.  Musculoskeletal:         General: Normal range of motion.      Cervical back: Normal range of motion and neck supple.      Right lower leg: No edema.      Left lower leg: No edema.   Lymphadenopathy:      Cervical: No cervical adenopathy.      Upper Body:      Right upper body: No supraclavicular adenopathy.      Left upper body: No supraclavicular adenopathy.   Skin:     Findings: No rash.      Comments: There is an erythematous slightly raised circular lesion with small area of central clearing right anterior thigh.  No additional atypical skin lesions.   Neurological:      Mental Status: She is alert.      Cranial Nerves: No cranial nerve deficit.      Motor: Motor function is intact. No abnormal muscle tone.      Coordination: Coordination is intact.      Gait: Gait is intact.      Deep Tendon Reflexes: Reflexes are normal and symmetric.   Psychiatric:         Mood and Affect: Mood normal.         PHQ-2 Depression Screening  Little interest or pleasure in doing things? 0-->not at all   Feeling down, depressed, or hopeless? 0-->not at all   PHQ-2 Total Score 0         Counseling was given to patient for the following topics: appropriate exercise, healthy eating habits, disease prevention, risk factors for cancer, importance of  immunizations, including risks and benefits, sun safety, seatbelt use, and safe driving. Also discussed the importance of regular dental and vision care, as well recommendation for a yearly screening skin exam after age 40.  Written information provided to patient on these topics and other health maintenance issues.        Diagnoses and all orders for this visit:    1. Encounter for health maintenance examination in adult (Primary)    2. Polyp of colon, unspecified part of colon, unspecified type  -     Ambulatory Referral For Screening Colonoscopy    3. Vitamin D deficiency  -     Vitamin D,25-Hydroxy; Future    4. Tremor  -     gabapentin (NEURONTIN) 100 MG capsule; Take 1 capsule by mouth 2 (Two) Times a Day.  Dispense: 60 capsule; Refill: 5- REFILL   Will refer to Neurology if tremors do not improve or resolve on Gabapentin treatment.    5. Depression, unspecified depression type   Continue current medication(s) as noted in the history of present illness.   Follow up per Dr. Zhong.    6. Generalized anxiety disorder   Continue current medication(s) as noted in the history of present illness.   Follow up per Dr. Zhong.    7. Chronic fatigue  -     Follicle Stimulating Hormone; Future  -     T4, Free; Future  -     Luteinizing Hormone; Future  -     Estradiol; Future  -     Testosterone, Free, Total; Future  -     Prolactin; Future  -     Folate; Future  -     Vitamin B12; Future   I recommended against the hormone pellets.    8. Acute UTI  -     Urine Culture - Urine, Urine, Clean Catch  -     cefdinir (OMNICEF) 300 MG capsule; Take 1 capsule by mouth 2 (Two) Times a Day for 7 days.  Dispense: 14 capsule; Refill: 0    9. Tick bite of right thigh, initial encounter  -     mupirocin (BACTROBAN) 2 % ointment; Apply 1 Application topically to the appropriate area as directed 3 (Three) Times a Day for 7 days.  Dispense: 30 g; Refill: 0    10. Urinary frequency  -     Cancel: POC Urinalysis Dipstick, Automated- Unable  to run UA specimen due to effect of Pyridium.  -     Urine Culture - Urine, Urine, Clean Catch    11. Screening for colon cancer  -     Ambulatory Referral For Screening Colonoscopy    12. Screening for cardiovascular condition  -     CT Cardiac Calcium Score Without Dye; Future  -     Lipid Panel; Future  -     Comprehensive Metabolic Panel; Future  -     TSH; Future  -     CBC & Differential; Future        The patient agrees to agrees to return for fasting labs.    I recommended COVID-19 bivalent vaccine at the pharmacy, Fall 2024.  I also recommended a yearly Influenza vaccine either in our office or at the pharmacy.        BMI is within normal parameters. No other follow-up for BMI required.            Return in about 1 year (around 6/24/2025) for Annual physical, fasting.

## 2024-06-26 ENCOUNTER — TELEPHONE (OUTPATIENT)
Dept: INTERNAL MEDICINE | Facility: CLINIC | Age: 49
End: 2024-06-26
Payer: OTHER GOVERNMENT

## 2024-06-26 ENCOUNTER — LAB (OUTPATIENT)
Dept: INTERNAL MEDICINE | Facility: CLINIC | Age: 49
End: 2024-06-26
Payer: OTHER GOVERNMENT

## 2024-06-26 DIAGNOSIS — R53.82 CHRONIC FATIGUE: ICD-10-CM

## 2024-06-26 DIAGNOSIS — E55.9 VITAMIN D DEFICIENCY: ICD-10-CM

## 2024-06-26 DIAGNOSIS — Z13.6 SCREENING FOR CARDIOVASCULAR CONDITION: ICD-10-CM

## 2024-06-26 LAB
BACTERIA SPEC AEROBE CULT: NO GROWTH
BASOPHILS # BLD AUTO: 0.05 10*3/MM3 (ref 0–0.2)
BASOPHILS NFR BLD AUTO: 1.1 % (ref 0–1.5)
DEPRECATED RDW RBC AUTO: 39.8 FL (ref 37–54)
EOSINOPHIL # BLD AUTO: 0.68 10*3/MM3 (ref 0–0.4)
EOSINOPHIL NFR BLD AUTO: 15.2 % (ref 0.3–6.2)
ERYTHROCYTE [DISTWIDTH] IN BLOOD BY AUTOMATED COUNT: 11.5 % (ref 12.3–15.4)
HCT VFR BLD AUTO: 35.5 % (ref 34–46.6)
HGB BLD-MCNC: 11.8 G/DL (ref 12–15.9)
IMM GRANULOCYTES # BLD AUTO: 0.01 10*3/MM3 (ref 0–0.05)
IMM GRANULOCYTES NFR BLD AUTO: 0.2 % (ref 0–0.5)
LYMPHOCYTES # BLD AUTO: 1.5 10*3/MM3 (ref 0.7–3.1)
LYMPHOCYTES NFR BLD AUTO: 33.6 % (ref 19.6–45.3)
MCH RBC QN AUTO: 31.8 PG (ref 26.6–33)
MCHC RBC AUTO-ENTMCNC: 33.2 G/DL (ref 31.5–35.7)
MCV RBC AUTO: 95.7 FL (ref 79–97)
MONOCYTES # BLD AUTO: 0.24 10*3/MM3 (ref 0.1–0.9)
MONOCYTES NFR BLD AUTO: 5.4 % (ref 5–12)
NEUTROPHILS NFR BLD AUTO: 1.99 10*3/MM3 (ref 1.7–7)
NEUTROPHILS NFR BLD AUTO: 44.5 % (ref 42.7–76)
NRBC BLD AUTO-RTO: 0 /100 WBC (ref 0–0.2)
PLATELET # BLD AUTO: 284 10*3/MM3 (ref 140–450)
PMV BLD AUTO: 10.2 FL (ref 6–12)
RBC # BLD AUTO: 3.71 10*6/MM3 (ref 3.77–5.28)
WBC NRBC COR # BLD AUTO: 4.47 10*3/MM3 (ref 3.4–10.8)

## 2024-06-26 PROCEDURE — 82746 ASSAY OF FOLIC ACID SERUM: CPT | Performed by: INTERNAL MEDICINE

## 2024-06-26 PROCEDURE — 83001 ASSAY OF GONADOTROPIN (FSH): CPT | Performed by: INTERNAL MEDICINE

## 2024-06-26 PROCEDURE — 82670 ASSAY OF TOTAL ESTRADIOL: CPT | Performed by: INTERNAL MEDICINE

## 2024-06-26 PROCEDURE — 80061 LIPID PANEL: CPT | Performed by: INTERNAL MEDICINE

## 2024-06-26 PROCEDURE — 84443 ASSAY THYROID STIM HORMONE: CPT | Performed by: INTERNAL MEDICINE

## 2024-06-26 PROCEDURE — 84402 ASSAY OF FREE TESTOSTERONE: CPT | Performed by: INTERNAL MEDICINE

## 2024-06-26 PROCEDURE — 84439 ASSAY OF FREE THYROXINE: CPT | Performed by: INTERNAL MEDICINE

## 2024-06-26 PROCEDURE — 82607 VITAMIN B-12: CPT | Performed by: INTERNAL MEDICINE

## 2024-06-26 PROCEDURE — 83002 ASSAY OF GONADOTROPIN (LH): CPT | Performed by: INTERNAL MEDICINE

## 2024-06-26 PROCEDURE — 84146 ASSAY OF PROLACTIN: CPT | Performed by: INTERNAL MEDICINE

## 2024-06-26 PROCEDURE — 84403 ASSAY OF TOTAL TESTOSTERONE: CPT | Performed by: INTERNAL MEDICINE

## 2024-06-26 PROCEDURE — 82306 VITAMIN D 25 HYDROXY: CPT | Performed by: INTERNAL MEDICINE

## 2024-06-26 PROCEDURE — 85025 COMPLETE CBC W/AUTO DIFF WBC: CPT | Performed by: INTERNAL MEDICINE

## 2024-06-26 PROCEDURE — 80053 COMPREHEN METABOLIC PANEL: CPT | Performed by: INTERNAL MEDICINE

## 2024-06-27 ENCOUNTER — TELEPHONE (OUTPATIENT)
Dept: INTERNAL MEDICINE | Facility: CLINIC | Age: 49
End: 2024-06-27
Payer: OTHER GOVERNMENT

## 2024-06-27 DIAGNOSIS — E55.9 VITAMIN D DEFICIENCY: Primary | ICD-10-CM

## 2024-06-27 LAB
25(OH)D3 SERPL-MCNC: 118 NG/ML (ref 30–100)
ALBUMIN SERPL-MCNC: 4.2 G/DL (ref 3.5–5.2)
ALBUMIN/GLOB SERPL: 2 G/DL
ALP SERPL-CCNC: 50 U/L (ref 39–117)
ALT SERPL W P-5'-P-CCNC: 14 U/L (ref 1–33)
ANION GAP SERPL CALCULATED.3IONS-SCNC: 9.8 MMOL/L (ref 5–15)
AST SERPL-CCNC: 17 U/L (ref 1–32)
BILIRUB SERPL-MCNC: 0.6 MG/DL (ref 0–1.2)
BUN SERPL-MCNC: 14 MG/DL (ref 6–20)
BUN/CREAT SERPL: 16.7 (ref 7–25)
CALCIUM SPEC-SCNC: 9.3 MG/DL (ref 8.6–10.5)
CHLORIDE SERPL-SCNC: 107 MMOL/L (ref 98–107)
CHOLEST SERPL-MCNC: 149 MG/DL (ref 0–200)
CO2 SERPL-SCNC: 24.2 MMOL/L (ref 22–29)
CREAT SERPL-MCNC: 0.84 MG/DL (ref 0.57–1)
EGFRCR SERPLBLD CKD-EPI 2021: 85.8 ML/MIN/1.73
ESTRADIOL SERPL HS-MCNC: 19.4 PG/ML
FOLATE SERPL-MCNC: 7.55 NG/ML (ref 4.78–24.2)
FSH SERPL-ACNC: 43.3 MIU/ML
GLOBULIN UR ELPH-MCNC: 2.1 GM/DL
GLUCOSE SERPL-MCNC: 80 MG/DL (ref 65–99)
HDLC SERPL-MCNC: 56 MG/DL (ref 40–60)
LDLC SERPL CALC-MCNC: 81 MG/DL (ref 0–100)
LDLC/HDLC SERPL: 1.45 {RATIO}
LH SERPL-ACNC: 21.2 MIU/ML
POTASSIUM SERPL-SCNC: 4.3 MMOL/L (ref 3.5–5.2)
PROLACTIN SERPL-MCNC: 15.2 NG/ML (ref 4.79–23.3)
PROT SERPL-MCNC: 6.3 G/DL (ref 6–8.5)
SODIUM SERPL-SCNC: 141 MMOL/L (ref 136–145)
T4 FREE SERPL-MCNC: 1.34 NG/DL (ref 0.92–1.68)
TRIGL SERPL-MCNC: 60 MG/DL (ref 0–150)
TSH SERPL DL<=0.05 MIU/L-ACNC: 1.08 UIU/ML (ref 0.27–4.2)
VIT B12 BLD-MCNC: 1986 PG/ML (ref 211–946)
VLDLC SERPL-MCNC: 12 MG/DL (ref 5–40)

## 2024-06-27 NOTE — TELEPHONE ENCOUNTER
Called patient to discuss lab results, as well as her previous labs done at [facility] .  Left message on voicemail.

## 2024-06-30 LAB
TESTOST FREE SERPL-MCNC: 0.6 PG/ML (ref 0–4.2)
TESTOST SERPL-MCNC: 19 NG/DL (ref 4–50)

## 2024-07-08 NOTE — TELEPHONE ENCOUNTER
Called patient again.    I informed the patient her testosterone and other hormone levels are normal.  She states she has already started testosterone pellet therapy prescribed from an outside facility.  She was told this would help grow her hair back. I discussed side effects of testosterone therapy with the patient.      Of note, she has seen Dermatology for the hair loss and was prescribed Minoxidil which she has been taking for 3 months without much improvement.  I did inform her it can take longer than that to see substantial effects of the medication.    She has also been given a prescription for NP Thyroid, but has not started it.  I informed the patient her thyroid testing done here and at [facility] this year and the previous year has been completely normal, and she should not take thyroid replacement therapy.  She states she will not take the medication.    I also informed the patient her Vitamin D level is in the toxic range.  She states she is currently taking Vitamin D3 10,000 IU daily.  I recommended she hold all Vitamin D supplements and repeat Vitamin D level in 3 months.  Order entered.    Lab letter sent.

## 2024-07-29 ENCOUNTER — TELEPHONE (OUTPATIENT)
Dept: INTERNAL MEDICINE | Facility: CLINIC | Age: 49
End: 2024-07-29
Payer: OTHER GOVERNMENT

## 2024-07-29 DIAGNOSIS — Z87.898 HISTORY OF MOTION SICKNESS: Primary | ICD-10-CM

## 2024-07-29 RX ORDER — SCOLOPAMINE TRANSDERMAL SYSTEM 1 MG/1
1 PATCH, EXTENDED RELEASE TRANSDERMAL
Qty: 4 PATCH | Refills: 0 | Status: SHIPPED | OUTPATIENT
Start: 2024-07-29

## 2024-07-29 NOTE — TELEPHONE ENCOUNTER
Called and spoke directly with patient about ED recommendations for SOB. Patient stated that she doesn't feel comfortable with going into the ED and will think about it.       ALEX Pina April 24, 2020 3:04 PM      Please call the patient and ask how long the cruise will be.

## 2024-07-29 NOTE — TELEPHONE ENCOUNTER
Caller: Maryana Love    Relationship to patient: Self    Best call back number: [phone]     Patient is needing: NAUSEA PATCHES THAT YOU PUT BEHIND EAR. PATIENT IS GOING ON A CRUISE 8/3.      [facility] #[id number] [place], [place] - [address] AT [facility] [address] - [phone]  - [phone] FX

## 2024-08-05 RX ORDER — SODIUM, POTASSIUM,MAG SULFATES 17.5-3.13G
1 SOLUTION, RECONSTITUTED, ORAL ORAL TAKE AS DIRECTED
Qty: 354 ML | Refills: 0 | Status: SHIPPED | OUTPATIENT
Start: 2024-08-05

## 2024-08-09 ENCOUNTER — TELEPHONE (OUTPATIENT)
Dept: INTERNAL MEDICINE | Facility: CLINIC | Age: 49
End: 2024-08-09
Payer: OTHER GOVERNMENT

## 2024-08-09 DIAGNOSIS — H10.30 ACUTE BACTERIAL CONJUNCTIVITIS, UNSPECIFIED LATERALITY: Primary | ICD-10-CM

## 2024-08-09 RX ORDER — SULFACETAMIDE SODIUM 100 MG/ML
1 SOLUTION/ DROPS OPHTHALMIC
Qty: 10 ML | Refills: 0 | Status: SHIPPED | OUTPATIENT
Start: 2024-08-09 | End: 2024-08-16

## 2024-08-09 NOTE — TELEPHONE ENCOUNTER
Caller: Maryana Love    Relationship: Self    Best call back number: [phone]    What medication are you requesting: MEDICATION FOR PINK EYE     What are your current symptoms: RUNNY EYES     How long have you been experiencing symptoms: SINCE YESTERDAY     Have you had these symptoms before:    [] Yes  [x] No    Have you been treated for these symptoms before:   [] Yes  [x] No    If a prescription is needed, what is your preferred pharmacy and phone number: [facility] #[id number] - [place], [place] - [address] AT [facility] [address] - [phone] [facility] [phone]      Additional notes:  THE PATIENT WAS AROUND HER MOTHER AND THE PATIENTS MOTHER WAS DIAGNOSED WITH PINK EYE THE PATIENT THINKS THAT SHE HAS IT NOW SINCE SHE TOUCHED HER MOTHERS EYES

## 2024-08-14 ENCOUNTER — OUTSIDE FACILITY SERVICE (OUTPATIENT)
Dept: GASTROENTEROLOGY | Facility: CLINIC | Age: 49
End: 2024-08-14
Payer: OTHER GOVERNMENT

## 2024-08-14 PROCEDURE — 88305 TISSUE EXAM BY PATHOLOGIST: CPT | Performed by: INTERNAL MEDICINE

## 2024-08-14 PROCEDURE — 45380 COLONOSCOPY AND BIOPSY: CPT | Performed by: INTERNAL MEDICINE

## 2024-08-15 ENCOUNTER — LAB REQUISITION (OUTPATIENT)
Dept: LAB | Facility: HOSPITAL | Age: 49
End: 2024-08-15
Payer: OTHER GOVERNMENT

## 2024-08-15 DIAGNOSIS — K62.89 OTHER SPECIFIED DISEASES OF ANUS AND RECTUM: ICD-10-CM

## 2024-08-15 DIAGNOSIS — Z12.11 ENCOUNTER FOR SCREENING FOR MALIGNANT NEOPLASM OF COLON: ICD-10-CM

## 2024-08-15 DIAGNOSIS — Z86.010 PERSONAL HISTORY OF COLONIC POLYPS: ICD-10-CM

## 2024-08-15 DIAGNOSIS — K64.8 OTHER HEMORRHOIDS: ICD-10-CM

## 2024-08-15 DIAGNOSIS — K63.5 POLYP OF COLON: ICD-10-CM

## 2024-08-16 LAB
CYTO UR: NORMAL
LAB AP CASE REPORT: NORMAL
LAB AP CLINICAL INFORMATION: NORMAL
LAB AP DIAGNOSIS COMMENT: NORMAL
PATH REPORT.FINAL DX SPEC: NORMAL
PATH REPORT.GROSS SPEC: NORMAL

## 2024-08-20 ENCOUNTER — OFFICE VISIT (OUTPATIENT)
Dept: INTERNAL MEDICINE | Facility: CLINIC | Age: 49
End: 2024-08-20
Payer: OTHER GOVERNMENT

## 2024-08-20 VITALS
HEART RATE: 76 BPM | TEMPERATURE: 98 F | DIASTOLIC BLOOD PRESSURE: 58 MMHG | WEIGHT: 113 LBS | SYSTOLIC BLOOD PRESSURE: 106 MMHG | BODY MASS INDEX: 19.29 KG/M2 | RESPIRATION RATE: 18 BRPM

## 2024-08-20 DIAGNOSIS — R82.998 LEUKOCYTES IN URINE: ICD-10-CM

## 2024-08-20 DIAGNOSIS — R35.0 URINARY FREQUENCY: ICD-10-CM

## 2024-08-20 DIAGNOSIS — N39.0 ACUTE UTI: Primary | ICD-10-CM

## 2024-08-20 PROCEDURE — 99213 OFFICE O/P EST LOW 20 MIN: CPT | Performed by: INTERNAL MEDICINE

## 2024-08-20 PROCEDURE — 81003 URINALYSIS AUTO W/O SCOPE: CPT | Performed by: INTERNAL MEDICINE

## 2024-08-20 PROCEDURE — 87186 SC STD MICRODIL/AGAR DIL: CPT | Performed by: INTERNAL MEDICINE

## 2024-08-20 PROCEDURE — 87086 URINE CULTURE/COLONY COUNT: CPT | Performed by: INTERNAL MEDICINE

## 2024-08-20 PROCEDURE — 87088 URINE BACTERIA CULTURE: CPT | Performed by: INTERNAL MEDICINE

## 2024-08-20 RX ORDER — PHENAZOPYRIDINE HYDROCHLORIDE 100 MG/1
100 TABLET, FILM COATED ORAL 3 TIMES DAILY PRN
Qty: 50 TABLET | Refills: 1 | Status: SHIPPED | OUTPATIENT
Start: 2024-08-20

## 2024-08-20 RX ORDER — CEFDINIR 300 MG/1
300 CAPSULE ORAL 2 TIMES DAILY
Qty: 14 CAPSULE | Refills: 0 | Status: SHIPPED | OUTPATIENT
Start: 2024-08-20 | End: 2024-08-27

## 2024-08-20 NOTE — PROGRESS NOTES
Subjective       Maryana Love is a 49 y.o. female.     Chief Complaint   Patient presents with    Urinary Problem     Odor    Urinary Frequency       History obtained from the patient.      Urinary Tract Infection   This is a new problem. Episode onset: 2 days ago. The problem occurs constantly. The problem has been worse. The patient is experiencing no pain. There has been no fever. She is sexually active. There is no history of pyelonephritis. Associated symptoms include frequency and urgency. Pertinent negatives include no chills, discharge, flank pain, hematuria, nausea or vomiting. Associated symptoms comments: Urine is malodorous  . She has tried increased fluids for the symptoms. Her past medical history is significant for recurrent UTIs. There is no history of kidney stones.          The following portions of the patient's history were reviewed and updated as appropriate: allergies, current medications, past family history, past medical history, past social history, past surgical history, and problem list.      Review of Systems   Constitutional:  Negative for chills and fever.   Gastrointestinal:  Negative for abdominal pain, diarrhea, nausea and vomiting.   Genitourinary:  Positive for frequency, pelvic pain and urgency. Negative for dysuria, flank pain and hematuria.   Skin:  Negative for rash.   Hematological:  Negative for adenopathy.           Objective     Blood pressure 106/58, pulse 76, temperature 98 °F (36.7 °C), temperature source Infrared, resp. rate 18, weight 51.3 kg (113 lb), not currently breastfeeding.    Physical Exam  Vitals and nursing note reviewed.   Constitutional:       Appearance: She is well-developed and normal weight.   Cardiovascular:      Rate and Rhythm: Normal rate and regular rhythm.      Heart sounds: Normal heart sounds. No murmur heard.  Pulmonary:      Effort: Pulmonary effort is normal.      Breath sounds: Normal breath sounds.   Abdominal:      General: Bowel  sounds are normal. There is no distension.      Palpations: Abdomen is soft. There is no hepatomegaly, splenomegaly or mass.      Tenderness: There is abdominal tenderness (mild RLQ/suprapubic). There is no right CVA tenderness, left CVA tenderness, guarding or rebound.   Skin:     Findings: No rash.   Neurological:      Mental Status: She is alert.   Psychiatric:         Mood and Affect: Mood normal.       Results for orders placed or performed in visit on 08/20/24   POC Urinalysis Dipstick, Automated    Specimen: Urine   Result Value Ref Range    Color Yellow Yellow, Straw, Dark Yellow, Briana    Clarity, UA Cloudy (A) Clear    Specific Gravity  1.015 1.005 - 1.030    pH, Urine 7.0 5.0 - 8.0    Leukocytes 75 [place]/ul (A) Negative    Nitrite, UA Positive (A) Negative    Protein, POC Negative Negative mg/dL    Glucose, UA Negative Negative mg/dL    Ketones, UA Negative Negative    Urobilinogen, UA Normal Normal, 0.2 E.U./dL    Bilirubin Negative Negative    Blood, UA 50 Samson/ul (A) Negative    Lot Number [id number]     Expiration Date 03/31/2025            Assessment & Plan   Diagnoses and all orders for this visit:    1. Acute UTI (Primary)  -     cefdinir (OMNICEF) 300 MG capsule; Take 1 capsule by mouth 2 (Two) Times a Day for 7 days.  Dispense: 14 capsule; Refill: 0  -     phenazopyridine (Pyridium) 100 MG tablet; Take 1 tablet by mouth 3 (Three) Times a Day As Needed for Bladder Spasms.  Dispense: 50 tablet; Refill: 1   Recommended plenty of fluids.    2. Urinary frequency  -     POC Urinalysis Dipstick, Automated    3. Leukocytes in urine  -     Urine Culture - Urine, Urine, Clean Catch; Future  -     Urine Culture - Urine, Urine, Clean Catch      Return if symptoms worsen or fail to improve.

## 2024-08-22 LAB — BACTERIA SPEC AEROBE CULT: ABNORMAL

## 2024-08-29 ENCOUNTER — PATIENT MESSAGE (OUTPATIENT)
Dept: INTERNAL MEDICINE | Facility: CLINIC | Age: 49
End: 2024-08-29
Payer: OTHER GOVERNMENT

## 2024-08-29 NOTE — TELEPHONE ENCOUNTER
From: Farida CONTRERAS  To: Maryana Courtneykarie Love  Sent: 8/29/2024 1:59 PM EDT  Subject: Pending Imaging    Maryana,    A recent review of our records indicate we have not received results for the CT Cardiac Calcium Score Without Dye ordered by Annabelle Camargo MD in June.      Imaging is a vital component of ensuring optimal outcomes in your healthcare, whether screening for potential illnesses that have not been symptomatic or routine to assure current treatment is effectively managing your condition.      If you have completed this test recently, please call us at [phone] so we can update our records. If you have not completed the test at another facility, please call Central Scheduling at [phone] to reschedule your appointment. We appreciate you and your willingness to take a role in improving your health.      Thanks      Farida Sylvester CMA

## 2024-09-18 ENCOUNTER — TELEPHONE (OUTPATIENT)
Dept: ONCOLOGY | Facility: CLINIC | Age: 49
End: 2024-09-18
Payer: OTHER GOVERNMENT

## 2024-09-18 DIAGNOSIS — R10.12 LUQ ABDOMINAL PAIN: Primary | ICD-10-CM

## 2024-10-07 ENCOUNTER — HOSPITAL ENCOUNTER (OUTPATIENT)
Dept: CT IMAGING | Facility: HOSPITAL | Age: 49
Discharge: HOME | End: 2024-10-07
Admitting: INTERNAL MEDICINE
Payer: OTHER GOVERNMENT

## 2024-10-07 DIAGNOSIS — R10.12 LUQ ABDOMINAL PAIN: ICD-10-CM

## 2024-10-07 PROCEDURE — 74160 CT ABDOMEN W/CONTRAST: CPT

## 2024-10-07 PROCEDURE — 25510000001 IOPAMIDOL 61 % SOLUTION: Performed by: INTERNAL MEDICINE

## 2024-10-07 RX ORDER — IOPAMIDOL 612 MG/ML
100 INJECTION, SOLUTION INTRAVASCULAR
Status: COMPLETED | OUTPATIENT
Start: 2024-10-07 | End: 2024-10-07

## 2024-10-07 RX ADMIN — IOPAMIDOL 75 ML: 612 INJECTION, SOLUTION INTRAVENOUS at 11:51

## 2024-10-09 ENCOUNTER — TELEPHONE (OUTPATIENT)
Dept: ONCOLOGY | Facility: CLINIC | Age: 49
End: 2024-10-09
Payer: OTHER GOVERNMENT

## 2024-11-12 ENCOUNTER — TELEPHONE (OUTPATIENT)
Dept: INTERNAL MEDICINE | Facility: CLINIC | Age: 49
End: 2024-11-12

## 2024-11-12 DIAGNOSIS — R25.1 TREMOR: Primary | ICD-10-CM

## 2024-11-12 NOTE — TELEPHONE ENCOUNTER
Caller: Maryana Love    Relationship: Self    Best call back number: [phone]     What is the medical concern/diagnosis: TREMORS GETTING WORSE    What specialty or service is being requested: NEUROLOGY    What is the provider, practice or medical service name: DR MIRELES    What is the office location: [facility]

## 2024-12-05 ENCOUNTER — TELEPHONE (OUTPATIENT)
Dept: INTERNAL MEDICINE | Facility: CLINIC | Age: 49
End: 2024-12-05
Payer: OTHER GOVERNMENT

## 2024-12-05 DIAGNOSIS — R25.1 TREMOR: Primary | ICD-10-CM

## 2024-12-05 NOTE — TELEPHONE ENCOUNTER
I do not see that she is on any medication to treat tremors currently, so I assume she means she wants one started.  I would recommend primidone.  If she is agreeable, I will send a prescription to the pharmacy

## 2024-12-05 NOTE — TELEPHONE ENCOUNTER
Caller: Maryana Love    Relationship: Self    Best call back number: [phone]     What was the call regarding: PATIENT CAN NOT GET IN TO SEE THE NEUROLOGIST UNTIL MARCH 6. TREMORS ARE OUT OF CONTROL, REQUESTING MEDICATION MODIFICATIONS. PLEASE ADVISE.     Is it okay if the provider responds through MyChart: NO PLEASE CALL

## 2024-12-05 NOTE — TELEPHONE ENCOUNTER
Tried to reach patient no answer left voicemail to return call    RELAY:    I do not see that she is on any medication to treat tremors currently, so I assume she means she wants one started.  I would recommend primidone.  If she is agreeable, I will send a prescription to the pharmacy

## 2024-12-06 RX ORDER — PRIMIDONE 50 MG/1
25 TABLET ORAL NIGHTLY
Qty: 30 TABLET | Refills: 5 | Status: SHIPPED | OUTPATIENT
Start: 2024-12-06

## 2024-12-06 NOTE — TELEPHONE ENCOUNTER
Name: Maryana Love      Relationship: Self      Best Callback Number: [phone]       HUB PROVIDED THE RELAY MESSAGE FROM THE OFFICE      PATIENT: VOICED UNDERSTANDING AND HAS NO FURTHER QUESTIONS AT THIS TIME    ADDITIONAL INFORMATION: PATIENT IS AGREEABLE AND WOULD LIKE TO START THE MEDICATION. SHE WOULD ALSO LIKE TO KNOW IF SHE NEEDS TO CONTINUE TAKING THE GABAPENTIN.

## 2024-12-06 NOTE — TELEPHONE ENCOUNTER
Prescription for primidone sent to the pharmacy.  We can increase this weekly as tolerated.      For now, I would continue the gabapentin as well.  If she does not feel like it is helping, I can increase the dose after she has tried the primidone.

## 2024-12-11 ENCOUNTER — TELEPHONE (OUTPATIENT)
Dept: INTERNAL MEDICINE | Facility: CLINIC | Age: 49
End: 2024-12-11

## 2024-12-11 DIAGNOSIS — H10.33 ACUTE BACTERIAL CONJUNCTIVITIS OF BOTH EYES: Primary | ICD-10-CM

## 2024-12-11 NOTE — TELEPHONE ENCOUNTER
Patient missed appointment this morning and apologized. She states can you still prescribe a prescription for her sore throat that feels like sandpaper. She states states she thinks she caught pink eye from her dog. Please advise?

## 2024-12-11 NOTE — TELEPHONE ENCOUNTER
I can prescribe antibiotic drops for her eyes, but she would need to be seen for an oral antibiotic.    Has she been taking any medication to treat the sore throat?

## 2024-12-12 RX ORDER — SULFACETAMIDE SODIUM 100 MG/ML
2 SOLUTION/ DROPS OPHTHALMIC
Qty: 15 ML | Refills: 0 | Status: SHIPPED | OUTPATIENT
Start: 2024-12-12 | End: 2024-12-19

## 2024-12-12 NOTE — TELEPHONE ENCOUNTER
Prescription for antibiotic eyedrops sent to the pharmacy.    I recommend she continue DayQuil and NyQuil, and add Mucinex.

## 2024-12-12 NOTE — TELEPHONE ENCOUNTER
Patient stated that yes she would like the drops called in for her eyes and she has been taking dayquil and nyquil

## 2024-12-12 NOTE — TELEPHONE ENCOUNTER
Pt notified of message   Prescription for antibiotic eyedrops sent to the pharmacy.     I recommend she continue DayQuil and NyQuil, and add Mucinex.         Note       Good understanding verbalized.

## 2025-01-10 DIAGNOSIS — R25.1 TREMOR: ICD-10-CM

## 2025-01-10 RX ORDER — GABAPENTIN 100 MG/1
100 CAPSULE ORAL 2 TIMES DAILY
Qty: 60 CAPSULE | Refills: 5 | Status: SHIPPED | OUTPATIENT
Start: 2025-01-10

## 2025-01-10 NOTE — TELEPHONE ENCOUNTER
Prescription has been sent to the pharmacy.  The patient is due for a physical exam, fasting, in June 2025.  Please schedule.

## 2025-01-10 NOTE — TELEPHONE ENCOUNTER
Caller: Maryana Love    Relationship: Self    Best call back number: [phone]     Requested Prescriptions:   Requested Prescriptions     Pending Prescriptions Disp Refills    gabapentin (NEURONTIN) 100 MG capsule 60 capsule 5     Sig: Take 1 capsule by mouth 2 (Two) Times a Day.        Pharmacy where request should be sent: [id number] [place] [address] AT [facility] [address] - [phone]  - [phone] FX     Last office visit with prescribing clinician: 8/20/2024   Last telemedicine visit with prescribing clinician: Visit date not found   Next office visit with prescribing clinician: Visit date not found     Additional details provided by patient: PATIENT WANTS TO KNOW IF PCP STILL WANTS HER TO TAKE THIS MEDICATON.    Does the patient have less than a 3 day supply:  [x] Yes  [] No    Would you like a call back once the refill request has been completed: [] Yes [] No    If the office needs to give you a call back, can they leave a voicemail: [] Yes [] No    Hernandez Alcantara Rep   01/10/25 14:24 EST

## 2025-01-10 NOTE — TELEPHONE ENCOUNTER
Last office visit (LOV) for chronic conditions 08/20/2024  Next office visit (NOV) Not scheduled yet  Urine drug screen (UDS)  Controlled substance agreement (CSA)     Return in about 1 year (around 6/24/2025) for Annual physical, fasting.

## 2025-03-06 ENCOUNTER — OFFICE VISIT (OUTPATIENT)
Dept: NEUROLOGY | Facility: CLINIC | Age: 50
End: 2025-03-06
Payer: OTHER GOVERNMENT

## 2025-03-06 VITALS
HEART RATE: 75 BPM | DIASTOLIC BLOOD PRESSURE: 58 MMHG | HEIGHT: 64 IN | SYSTOLIC BLOOD PRESSURE: 106 MMHG | OXYGEN SATURATION: 99 % | WEIGHT: 125 LBS | BODY MASS INDEX: 21.34 KG/M2

## 2025-03-06 DIAGNOSIS — R25.1 TREMOR: Primary | ICD-10-CM

## 2025-03-06 PROCEDURE — 99244 OFF/OP CNSLTJ NEW/EST MOD 40: CPT | Performed by: PSYCHIATRY & NEUROLOGY

## 2025-03-06 RX ORDER — HYDROXYZINE PAMOATE 25 MG/1
CAPSULE ORAL
COMMUNITY
Start: 2025-02-18

## 2025-03-06 RX ORDER — TOPIRAMATE 100 MG/1
100 TABLET, FILM COATED ORAL 2 TIMES DAILY
Qty: 180 TABLET | Refills: 3 | Status: SHIPPED | OUTPATIENT
Start: 2025-03-06

## 2025-03-06 RX ORDER — TOPIRAMATE 25 MG/1
TABLET, FILM COATED ORAL
Qty: 42 TABLET | Refills: 0 | Status: SHIPPED | OUTPATIENT
Start: 2025-03-06

## 2025-03-06 RX ORDER — BUPROPION HYDROCHLORIDE 300 MG/1
TABLET ORAL
COMMUNITY
Start: 2025-02-11

## 2025-03-06 RX ORDER — MUPIROCIN 20 MG/G
OINTMENT TOPICAL
COMMUNITY
Start: 2025-01-28

## 2025-03-06 RX ORDER — MINOXIDIL 2.5 MG/1
TABLET ORAL
COMMUNITY
Start: 2025-01-25

## 2025-03-06 RX ORDER — QUETIAPINE FUMARATE 25 MG/1
25 TABLET, FILM COATED ORAL NIGHTLY
COMMUNITY
Start: 2025-02-19

## 2025-03-06 RX ORDER — CARIPRAZINE 3 MG/1
CAPSULE, GELATIN COATED ORAL
COMMUNITY
Start: 2024-12-13 | End: 2025-03-06

## 2025-03-06 RX ORDER — LEVOTHYROXINE, LIOTHYRONINE 57; 13.5 UG/1; UG/1
TABLET ORAL
COMMUNITY
Start: 2025-02-12

## 2025-03-06 NOTE — PROGRESS NOTES
Subjective   Patient ID: Maryana Love is a 49 y.o. female     Chief Complaint   Patient presents with    Tremors     BUE        History of Present Illness    49 y.o. female referred by Dr Annabelle Camargo for tremors.     Last visit on 12/20/17 continue TPM     Stopped TPM as did not feel as it was helping.      Tremors interfering with eating with a fork, drinking, typing.     Developed tremors in hands for approximately 20 years.  Multiple family members with tremor.  Tremor starting to interfering with writing and holding glasses.  Present with posture and movement.  Not present at rest.   Excitement and fatigue worsen sx, rest improves.     Reviewed medical records:    Taking LTG, Vraylar BPAD.     Tremors in hands worsening.  Started on primidone.     Past Medical History:   Diagnosis Date    Allergic rhinitis     Anxiety disorder     Description: Long-term.    Bipolar affective disorder     Dx 2014- [facility].    Chronic urinary tract infection     Description: Diagnosed 2008.  Cystoscopy normal 1/10/13, other than mild urethral inflammation. Normal diagnostic cystourethroscopy with urodynamics at  4/15.    Colon polyps     Dx 4/19- fragments of tubular adenoma (ascending colon)- [facility]    COVID-19 virus infection 01/21/2022    Cyst of ovary     Description: Left (2.5 cm) dx by CT Scan 9/6/13.    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)    Open breast wound     right breast from surgery in dec 2020    Tremor      Family History   Problem Relation Age of Onset    Osteoarthritis Mother     Hypertension Mother     Rheum arthritis Mother   "   Bipolar disorder Father     Coronary artery disease Father     Hypertension Father     Migraines Father     Throat cancer Father     Seizures Father     Heart attack Father 69            Alcohol abuse Brother     Hypertension Brother     Cardiomyopathy Brother     Other Brother         \"Sociopath\"    Stroke Maternal Grandfather     Cardiomyopathy Brother     Hypertension Brother     No Known Problems Brother         choked to death    Breast cancer Neg Hx     Ovarian cancer Neg Hx      Social History     Socioeconomic History    Marital status:     Number of children: 3   Tobacco Use    Smoking status: Former     Current packs/day: 0.00     Average packs/day: 0.3 packs/day for 29.1 years (7.3 ttl pk-yrs)     Types: Cigarettes, Electronic Cigarette     Start date:      Quit date: 2022     Years since quitting: 3.0     Passive exposure: Never    Smokeless tobacco: Never    Tobacco comments:     Quit intermittently for several years at a time; recently in alcohol rehab and restarted smoking but quit 21 and started  Vaping daily since 21   Vaping Use    Vaping status: Every Day    Start date: 2018   Substance and Sexual Activity    Alcohol use: Yes     Comment: 1-2 seltzers( 1-2 times per month0    Drug use: Yes     Types: Marijuana     Comment: edible once per month, usually Delta 8    Sexual activity: Yes     Partners: Male     Birth control/protection: Surgical       Review of Systems   Constitutional:  Negative for activity change, fatigue and unexpected weight change.   HENT:  Positive for hearing loss. Negative for tinnitus and trouble swallowing.    Eyes:  Negative for photophobia and visual disturbance.   Respiratory:  Negative for apnea, cough and choking.    Cardiovascular:  Negative for leg swelling.   Gastrointestinal:  Negative for nausea and vomiting.   Endocrine: Negative for cold intolerance and heat intolerance.   Genitourinary:  Negative for difficulty urinating, " "frequency, menstrual problem and urgency.   Musculoskeletal:  Negative for back pain, gait problem, myalgias and neck pain.   Skin:  Negative for color change and rash.   Allergic/Immunologic: Negative for immunocompromised state.   Neurological:  Positive for tremors. Negative for dizziness, seizures, syncope, facial asymmetry, speech difficulty, weakness, light-headedness, numbness and headaches.   Hematological:  Negative for adenopathy. Does not bruise/bleed easily.   Psychiatric/Behavioral:  Positive for decreased concentration. Negative for behavioral problems, confusion, hallucinations and sleep disturbance. The patient is nervous/anxious.        Objective     Vitals:    03/06/25 1408   BP: 106/58   Pulse: 75   SpO2: 99%   Weight: 56.7 kg (125 lb)   Height: 163 cm (64.17\")       Neurological Exam  Mental Status  Awake, alert and oriented to person, place and time. Oriented to person, place and time. Speech is normal. Language is fluent with no aphasia. Attention and concentration are normal. Fund of knowledge is appropriate for level of education.    Cranial Nerves  CN III, IV, VI: Extraocular movements intact bilaterally. Pupils equal round and reactive to light bilaterally.  CN V: Facial sensation is normal.  CN VII: Full and symmetric facial movement.  CN IX, X: Palate elevates symmetrically  CN XI: Shoulder shrug strength is normal.  CN XII: Tongue midline without atrophy or fasciculations.    Motor   Strength is 5/5 throughout all four extremities.    Sensory  Sensation is intact to light touch, pinprick, vibration and proprioception in all four extremities.    Reflexes  Deep tendon reflexes are 2+ and symmetric in all four extremities.    Coordination  Right: Finger-to-nose abnormality:Left: Finger-to-nose abnormality:  Intention tremor .    Gait  Normal casual, toe, heel and tandem gait.       Physical Exam  Eyes:      Extraocular Movements: Extraocular movements intact.      Pupils: Pupils are equal, " round, and reactive to light.   Neurological:      Motor: Motor strength is normal.     Deep Tendon Reflexes: Reflexes are normal and symmetric.   Psychiatric:         Speech: Speech normal.         Office Visit on 08/20/2024   Component Date Value Ref Range Status    Color 08/20/2024 Yellow  Yellow, Straw, Dark Yellow, Briana Final    Clarity, UA 08/20/2024 Cloudy (A)  Clear Final    Specific Gravity  08/20/2024 1.015  1.005 - 1.030 Final    pH, Urine 08/20/2024 7.0  5.0 - 8.0 Final    Leukocytes 08/20/2024 75 Gulshan/ul (A)  Negative Final    Nitrite, UA 08/20/2024 Positive (A)  Negative Final    Protein, POC 08/20/2024 Negative  Negative mg/dL Final    Glucose, UA 08/20/2024 Negative  Negative mg/dL Final    Ketones, UA 08/20/2024 Negative  Negative Final    Urobilinogen, UA 08/20/2024 Normal  Normal, 0.2 E.U./dL Final    Bilirubin 08/20/2024 Negative  Negative Final    Blood, UA 08/20/2024 50 Samson/ul (A)  Negative Final    Lot Number 08/20/2024 [id number]   Final    Expiration Date 08/20/2024 03/31/2025   Final    Urine Culture 08/20/2024 >100,000 CFU/mL Escherichia coli (A)   Final         Assessment & Plan     Problem List Items Addressed This Visit          Neuro    Tremor - Primary    Current Assessment & Plan     Start Topamax 25 mg BID titrate to Topamax 100 mg BID               No follow-ups on file.

## 2025-03-20 ENCOUNTER — TELEPHONE (OUTPATIENT)
Dept: NEUROLOGY | Facility: CLINIC | Age: 50
End: 2025-03-20
Payer: OTHER GOVERNMENT

## 2025-03-20 NOTE — TELEPHONE ENCOUNTER
Caller: Maryana Love    Relationship: Self    Best call back number: [phone]    Which medication are you concerned about: TOPIRAMATE 100 MG/DAY    Who prescribed you this medication: DR MIRELES    When did you start taking this medication: 2 WEEKS AGO    What are your concerns: PATIENT IS CONCERNED BECAUSE A  THAT TOOK OUT 5 PEOPLE RECENTLY WAS CHARGED WITH DUI BECAUSE HE WAS ON A CNS DEPRESSANT, TOPIRAMATE, AND SHE'S CONCERNED IF SHE SHOULD BE DRIVING ON THIS MEDICATION ALSO.    How long have you had these concerns: TODAY

## 2025-03-24 NOTE — TELEPHONE ENCOUNTER
Called Maryana to follow up on the my chart message Madie had sent her as I noticed she had not read this yet. We discussed the Titration Monitoring Plan and the rationale  used for titrating her medications such as TPM to monitor for potential side effects, as everyone responds differently to medications. Maryana is now taking the 100mg dose and is not experiencing dizziness or se's.    Maryana noted that she has natural coordination issues and would likely not be able to pass a field sobriety test, even when not impaired. The report did not specify which CNS depressant medication the  was taking.    Maryana will remain on the TPM and will let us know if she experiences any issues. She appreciates the follow-up call.

## 2025-04-03 ENCOUNTER — TELEPHONE (OUTPATIENT)
Dept: INTERNAL MEDICINE | Facility: CLINIC | Age: 50
End: 2025-04-03
Payer: OTHER GOVERNMENT

## 2025-04-03 DIAGNOSIS — L29.9 ITCHING: Primary | ICD-10-CM

## 2025-04-03 NOTE — TELEPHONE ENCOUNTER
Spoke with patient she stated that she been itching for two weeks all over her body. Feels like an allergic reaction but she is not for sure to what. No changes in food, diet, clothing, detergent, cleaning supplies, or meds. Only tried benadryl, no relief. Throat itchy and eyes are itchy and watery. She mention she is SOB but its because she is out of shape not because of the itching. No new stressors or changes in life.     Pt would like a referral to an allergist to see what is going on.

## 2025-04-03 NOTE — TELEPHONE ENCOUNTER
Hydroxyzine would also work for the itching.  It can be taken every 6 hours, and would replace all the other antihistamines.  It does cause tiredness.  If she would like a prescription, please let me know.

## 2025-04-03 NOTE — TELEPHONE ENCOUNTER
Patient called and thinks she is having an alergic reaction, itching, no hives, has sob but not having trouble breathing. Transferred to nurse.

## 2025-04-03 NOTE — TELEPHONE ENCOUNTER
I just noticed hydroxyzine is already on her medication list.  It was probably prescribed for anxiety.  If she has some at home, she can take that.  If she needs a refill, let me know.

## 2025-04-03 NOTE — TELEPHONE ENCOUNTER
She just finished a steroid pack last night and it didn't help. She took Methylprednisolone 4mg per tablet starter pack     She thought the topamax was causing her itching and it wasn't.       She wanted to know what other antihistamines to take in interim besides benadryl and zyrtec(already taking daily).  Told her allegra, xyzal, or Claritin.

## 2025-04-04 NOTE — TELEPHONE ENCOUNTER
Pt stated she has some and takes them everyday. No refills needed at this time    The itching hasnt improved

## 2025-04-14 ENCOUNTER — TELEPHONE (OUTPATIENT)
Dept: INTERNAL MEDICINE | Facility: CLINIC | Age: 50
End: 2025-04-14
Payer: OTHER GOVERNMENT

## 2025-04-14 NOTE — TELEPHONE ENCOUNTER
Patient was told by allergist not to take any histamine medication 5 days prior appointment. Patient is asking if she should stop taking also hydrOXYzine pamoate  prescribed by PCP. Requested call back.

## 2025-04-18 ENCOUNTER — TELEPHONE (OUTPATIENT)
Dept: INTERNAL MEDICINE | Facility: CLINIC | Age: 50
End: 2025-04-18
Payer: OTHER GOVERNMENT

## 2025-04-18 NOTE — TELEPHONE ENCOUNTER
I would recommend starting with some basic blood work to rule out other etiology for the itching.  Please schedule her with me on Monday, 4/21/2025.    In the interim, would she would like to try an oral steroid to see if it helps?

## 2025-04-18 NOTE — TELEPHONE ENCOUNTER
Pt scheduled Monday at 10 am,    Didn't want to try another course of steriods because she has already been on them and it hasn't worked

## 2025-04-18 NOTE — TELEPHONE ENCOUNTER
Patient called. She went to allergist for her itchy skin and testing came back negative for any allergies. Her whole body is itchy, including her throat, mouth and eyes. She does not have any bumps, rash or hives. Nothing is helping. The allergist said that it could be related to her nervous system. Patient wants to know who she can be referred to for help.   Call: [phone]

## 2025-04-21 ENCOUNTER — OFFICE VISIT (OUTPATIENT)
Dept: INTERNAL MEDICINE | Facility: CLINIC | Age: 50
End: 2025-04-21
Payer: OTHER GOVERNMENT

## 2025-04-21 ENCOUNTER — TRANSCRIBE ORDERS (OUTPATIENT)
Dept: GENERAL RADIOLOGY | Facility: HOSPITAL | Age: 50
End: 2025-04-21
Payer: OTHER GOVERNMENT

## 2025-04-21 ENCOUNTER — HOSPITAL ENCOUNTER (OUTPATIENT)
Dept: GENERAL RADIOLOGY | Facility: HOSPITAL | Age: 50
Discharge: HOME | End: 2025-04-21
Admitting: ALLERGY & IMMUNOLOGY
Payer: OTHER GOVERNMENT

## 2025-04-21 VITALS
BODY MASS INDEX: 20.66 KG/M2 | RESPIRATION RATE: 16 BRPM | SYSTOLIC BLOOD PRESSURE: 88 MMHG | TEMPERATURE: 98.4 F | WEIGHT: 121 LBS | DIASTOLIC BLOOD PRESSURE: 62 MMHG | HEART RATE: 88 BPM

## 2025-04-21 DIAGNOSIS — E55.9 VITAMIN D DEFICIENCY: ICD-10-CM

## 2025-04-21 DIAGNOSIS — L29.9 PRURITUS, UNSPECIFIED: ICD-10-CM

## 2025-04-21 DIAGNOSIS — L29.9 PRURITUS, UNSPECIFIED: Primary | ICD-10-CM

## 2025-04-21 DIAGNOSIS — L29.9 ITCHING: Primary | ICD-10-CM

## 2025-04-21 LAB
25(OH)D3 SERPL-MCNC: 37.6 NG/ML (ref 30–100)
ALBUMIN SERPL-MCNC: 4 G/DL (ref 3.5–5.2)
ALBUMIN/GLOB SERPL: 1.7 G/DL
ALP SERPL-CCNC: 77 U/L (ref 39–117)
ALT SERPL W P-5'-P-CCNC: 16 U/L (ref 1–33)
ANION GAP SERPL CALCULATED.3IONS-SCNC: 11.5 MMOL/L (ref 5–15)
AST SERPL-CCNC: 24 U/L (ref 1–32)
BASOPHILS # BLD AUTO: 0.07 10*3/MM3 (ref 0–0.2)
BASOPHILS NFR BLD AUTO: 1.2 % (ref 0–1.5)
BILIRUB SERPL-MCNC: 0.3 MG/DL (ref 0–1.2)
BUN SERPL-MCNC: 15 MG/DL (ref 6–20)
BUN/CREAT SERPL: 18.1 (ref 7–25)
CALCIUM SPEC-SCNC: 9.1 MG/DL (ref 8.6–10.5)
CHLORIDE SERPL-SCNC: 108 MMOL/L (ref 98–107)
CO2 SERPL-SCNC: 24.5 MMOL/L (ref 22–29)
CREAT SERPL-MCNC: 0.83 MG/DL (ref 0.57–1)
CRP SERPL-MCNC: <0.3 MG/DL (ref 0–0.5)
DEPRECATED RDW RBC AUTO: 38.2 FL (ref 37–54)
EGFRCR SERPLBLD CKD-EPI 2021: 86.5 ML/MIN/1.73
EOSINOPHIL # BLD AUTO: 0.68 10*3/MM3 (ref 0–0.4)
EOSINOPHIL NFR BLD AUTO: 11.5 % (ref 0.3–6.2)
ERYTHROCYTE [DISTWIDTH] IN BLOOD BY AUTOMATED COUNT: 11.2 % (ref 12.3–15.4)
ERYTHROCYTE [SEDIMENTATION RATE] IN BLOOD: 1 MM/HR (ref 0–20)
GLOBULIN UR ELPH-MCNC: 2.4 GM/DL
GLUCOSE SERPL-MCNC: 84 MG/DL (ref 65–99)
HCT VFR BLD AUTO: 38.8 % (ref 34–46.6)
HGB BLD-MCNC: 12.9 G/DL (ref 12–15.9)
IMM GRANULOCYTES # BLD AUTO: 0.01 10*3/MM3 (ref 0–0.05)
IMM GRANULOCYTES NFR BLD AUTO: 0.2 % (ref 0–0.5)
LYMPHOCYTES # BLD AUTO: 1.68 10*3/MM3 (ref 0.7–3.1)
LYMPHOCYTES NFR BLD AUTO: 28.5 % (ref 19.6–45.3)
MCH RBC QN AUTO: 31.4 PG (ref 26.6–33)
MCHC RBC AUTO-ENTMCNC: 33.2 G/DL (ref 31.5–35.7)
MCV RBC AUTO: 94.4 FL (ref 79–97)
MONOCYTES # BLD AUTO: 0.32 10*3/MM3 (ref 0.1–0.9)
MONOCYTES NFR BLD AUTO: 5.4 % (ref 5–12)
NEUTROPHILS NFR BLD AUTO: 3.14 10*3/MM3 (ref 1.7–7)
NEUTROPHILS NFR BLD AUTO: 53.2 % (ref 42.7–76)
NRBC BLD AUTO-RTO: 0 /100 WBC (ref 0–0.2)
PLATELET # BLD AUTO: 349 10*3/MM3 (ref 140–450)
PMV BLD AUTO: 9.6 FL (ref 6–12)
POTASSIUM SERPL-SCNC: 4.1 MMOL/L (ref 3.5–5.2)
PROT SERPL-MCNC: 6.4 G/DL (ref 6–8.5)
RBC # BLD AUTO: 4.11 10*6/MM3 (ref 3.77–5.28)
SODIUM SERPL-SCNC: 144 MMOL/L (ref 136–145)
TSH SERPL DL<=0.05 MIU/L-ACNC: 0.41 UIU/ML (ref 0.27–4.2)
WBC NRBC COR # BLD AUTO: 5.9 10*3/MM3 (ref 3.4–10.8)

## 2025-04-21 PROCEDURE — 71046 X-RAY EXAM CHEST 2 VIEWS: CPT

## 2025-04-21 PROCEDURE — 85652 RBC SED RATE AUTOMATED: CPT | Performed by: INTERNAL MEDICINE

## 2025-04-21 PROCEDURE — 82306 VITAMIN D 25 HYDROXY: CPT | Performed by: INTERNAL MEDICINE

## 2025-04-21 PROCEDURE — 85025 COMPLETE CBC W/AUTO DIFF WBC: CPT | Performed by: INTERNAL MEDICINE

## 2025-04-21 PROCEDURE — 99213 OFFICE O/P EST LOW 20 MIN: CPT | Performed by: INTERNAL MEDICINE

## 2025-04-21 PROCEDURE — 36415 COLL VENOUS BLD VENIPUNCTURE: CPT | Performed by: INTERNAL MEDICINE

## 2025-04-21 PROCEDURE — 86140 C-REACTIVE PROTEIN: CPT | Performed by: INTERNAL MEDICINE

## 2025-04-21 PROCEDURE — 80053 COMPREHEN METABOLIC PANEL: CPT | Performed by: INTERNAL MEDICINE

## 2025-04-21 PROCEDURE — 84443 ASSAY THYROID STIM HORMONE: CPT | Performed by: INTERNAL MEDICINE

## 2025-04-21 NOTE — PROGRESS NOTES
Subjective       Maryana Love is a 49 y.o. female.     Chief Complaint   Patient presents with   • Itching     Possible topirmate        History obtained from {source of history:960465}.      History of Present Illness   History of Present Illness      Results      {Common H&P Review Areas:03603}      Review of Systems          Objective     Blood pressure (!) 88/62, pulse 88, temperature 98.4 °F (36.9 °C), temperature source Infrared, resp. rate 16, weight 54.9 kg (121 lb), not currently breastfeeding.    Physical Exam      Assessment & Plan   {Assess/PlanSmartLinks:45185}    Assessment & Plan      No follow-ups on file.             {JES[employer]S [employer] Provider Statement:58549}

## 2025-04-21 NOTE — PROGRESS NOTES
Subjective       Maryana Love is a 49 y.o. female.     Chief Complaint   Patient presents with    Itching     Possible topirmate        History obtained from the patient.      History of Present Illness   History of Present Illness  The patient presents for evaluation of persistent itching.    The chief complaint is generalized itching that has been ongoing for the past 3.5 to 4 weeks. Despite using Benadryl, steroid packs, and receiving a steroid injection, no relief has been achieved. An allergist indicated that the itching might not be due to a histamine response, and allergy testing returned negative results. There is no associated rash, bumps, hives, chest pain, shortness of breath, wheezing, stridor, coughing, fever, chills, nausea, vomiting, diarrhea, constipation, abdominal pain, jaundice, gland swelling, muscle aches, or joint aches. Fatigue is reported, likely due to the use of Benadryl, and severe knee pain is noted, which is not a new symptom. An itchy throat is also reported.     Topiramate was restarted approximately 3.5 weeks ago, a medication previously taken without issue. Symptoms have shown improvement since discontinuing Topiramate three days ago, and it is suspected that this medication may be the cause of the itching. . No other new medications, supplements, or known triggers such as stress are reported. Benadryl and Hydroxyzine have been used without relief.    Results  Labs   - Allergy testing: Negative    The following portions of the patient's history were reviewed and updated as appropriate: allergies, current medications, past family history, past medical history, past social history, past surgical history, and problem list.      Review of Systems   Constitutional:  Positive for fatigue. Negative for chills and fever.   HENT:  Negative for sore throat.    Respiratory:  Negative for cough, shortness of breath, wheezing and stridor.    Cardiovascular:  Negative for chest pain.    Gastrointestinal:  Negative for abdominal pain, blood in stool, constipation, diarrhea, nausea and vomiting.   Musculoskeletal:  Negative for arthralgias and myalgias.   Skin:  Negative for rash.   Hematological:  Negative for adenopathy.           Objective     Blood pressure (!) 88/62, pulse 88, temperature 98.4 °F (36.9 °C), temperature source Infrared, resp. rate 16, weight 54.9 kg (121 lb), not currently breastfeeding.    Physical Exam  Vitals and nursing note reviewed.   Constitutional:       Appearance: She is normal weight.   Eyes:      General: No scleral icterus.  Cardiovascular:      Rate and Rhythm: Normal rate and regular rhythm.      Heart sounds: Normal heart sounds. No murmur heard.  Pulmonary:      Effort: Pulmonary effort is normal.      Breath sounds: Normal breath sounds. No stridor.   Skin:     Coloration: Skin is not jaundiced.      Findings: No rash.   Neurological:      Mental Status: She is alert.           Assessment & Plan   Diagnoses and all orders for this visit:    1. Itching (Primary)  -     Comprehensive Metabolic Panel  -     CBC & Differential  -     Sedimentation Rate  -     C-reactive Protein  -     TSH   chest x-ray ordered by the allergist;  Will continue to stay off the Topiramate. If symptoms improve after discontinuing topiramate, this will be documented as an allergy in the medical record.    2. Vitamin D deficiency  -     Vitamin D,25-Hydroxy          Return in about 2 months (around 6/21/2025) for Annual physical, fasting.             Patient or patient representative verbalized consent for the use of Ambient Listening during the visit with  Annabelle Camargo MD for chart documentation. 4/24/2025  16:24 EDT

## 2025-04-24 ENCOUNTER — RESULTS FOLLOW-UP (OUTPATIENT)
Dept: INTERNAL MEDICINE | Facility: CLINIC | Age: 50
End: 2025-04-24
Payer: OTHER GOVERNMENT

## 2025-04-24 NOTE — LETTER
Maryana Love  [address] [facility] [address] [address]    April 24, 2025     Dear Ms. Love:    Below are the results from your recent visit:    Resulted Orders   Vitamin D,25-Hydroxy   Result Value Ref Range    25 Hydroxy, Vitamin D 37.6 30.0 - 100.0 ng/ml   Comprehensive Metabolic Panel   Result Value Ref Range    Glucose 84 65 - 99 mg/dL    BUN 15 6 - 20 mg/dL    Creatinine 0.83 0.57 - 1.00 mg/dL    Sodium 144 136 - 145 mmol/L    Potassium 4.1 3.5 - 5.2 mmol/L    Chloride 108 (H) 98 - 107 mmol/L    CO2 24.5 22.0 - 29.0 mmol/L    Calcium 9.1 8.6 - 10.5 mg/dL    Total Protein 6.4 6.0 - 8.5 g/dL    Albumin 4.0 3.5 - 5.2 g/dL    ALT (SGPT) 16 1 - 33 U/L    AST (SGOT) 24 1 - 32 U/L    Alkaline Phosphatase 77 39 - 117 U/L    Total Bilirubin 0.3 0.0 - 1.2 mg/dL    Globulin 2.4 gm/dL    A/G Ratio 1.7 g/dL    BUN/Creatinine Ratio 18.1 7.0 - 25.0    Anion Gap 11.5 5.0 - 15.0 mmol/L    eGFR 86.5 >60.0 mL/min/1.73   Sedimentation Rate   Result Value Ref Range    Sed Rate 1 0 - 20 mm/hr   C-reactive Protein   Result Value Ref Range    C-Reactive Protein <0.30 0.00 - 0.50 mg/dL   TSH   Result Value Ref Range    TSH 0.415 0.270 - 4.200 uIU/mL   CBC Auto Differential   Result Value Ref Range    WBC 5.90 3.40 - 10.80 10*3/mm3    RBC 4.11 3.77 - 5.28 10*6/mm3    Hemoglobin 12.9 12.0 - 15.9 g/dL    Hematocrit 38.8 34.0 - 46.6 %    MCV 94.4 79.0 - 97.0 fL    MCH 31.4 26.6 - 33.0 pg    MCHC 33.2 31.5 - 35.7 g/dL    RDW 11.2 (L) 12.3 - 15.4 %    RDW-SD 38.2 37.0 - 54.0 fl    MPV 9.6 6.0 - 12.0 fL    Platelets 349 140 - 450 10*3/mm3    Neutrophil % 53.2 42.7 - 76.0 %    Lymphocyte % 28.5 19.6 - 45.3 %    Monocyte % 5.4 5.0 - 12.0 %    Eosinophil % 11.5 (H) 0.3 - 6.2 %    Basophil % 1.2 0.0 - 1.5 %    Immature Grans % 0.2 0.0 - 0.5 %    Neutrophils, Absolute 3.14 1.70 - 7.00 10*3/mm3    Lymphocytes, Absolute 1.68 0.70 - 3.10 10*3/mm3    Monocytes, Absolute 0.32 0.10 - 0.90 10*3/mm3    Eosinophils, Absolute 0.68 (H) 0.00 -  0.40 10*3/mm3    Basophils, Absolute 0.07 0.00 - 0.20 10*3/mm3    Immature Grans, Absolute 0.01 0.00 - 0.05 10*3/mm3    nRBC 0.0 0.0 - 0.2 /100 WBC       Labs are normal.    If you have any questions or concerns, please don't hesitate to call.         Sincerely,        Annabelle Camargo MD

## 2025-04-28 ENCOUNTER — TELEPHONE (OUTPATIENT)
Dept: INTERNAL MEDICINE | Facility: CLINIC | Age: 50
End: 2025-04-28

## 2025-05-13 ENCOUNTER — TELEPHONE (OUTPATIENT)
Dept: NEUROLOGY | Facility: CLINIC | Age: 50
End: 2025-05-13
Payer: OTHER GOVERNMENT

## 2025-05-13 NOTE — TELEPHONE ENCOUNTER
Provider: DR MIRELES    Caller: Maryana Love    Relationship to Patient: Self    Pharmacy: [id number] - [place], [place] - [address] AT [facility] [address] - [phone]  - [phone] FX     Phone Number: [phone]     Reason for Call: THE PT WAS TAKING TOPIRAMATE BUT SHE REALIZED THAT SHE WAS ALLERGIC TO IT. IT STARTED TO MAKE HER VERY ITCHY. SHE NEEDS A DIFFERENT MED TO TAKE OVER THAT ONE.     PLEASE ADVISE  THANK YOU

## 2025-05-20 RX ORDER — PROPRANOLOL HYDROCHLORIDE 60 MG/1
60 CAPSULE, EXTENDED RELEASE ORAL DAILY
Qty: 30 CAPSULE | Refills: 11 | Status: SHIPPED | OUTPATIENT
Start: 2025-05-20 | End: 2026-05-20

## 2025-05-20 NOTE — TELEPHONE ENCOUNTER
Left detailed voice mail stating that Dr Torrez has sent a new prescription to her pharmacy to replace the topamax.

## 2025-05-22 ENCOUNTER — TELEPHONE (OUTPATIENT)
Dept: NEUROLOGY | Facility: CLINIC | Age: 50
End: 2025-05-22
Payer: OTHER GOVERNMENT

## 2025-05-22 NOTE — TELEPHONE ENCOUNTER
Pt called to check and see if new Rx was sent to Pharmacy.  Let the Pt know it was sent on 5/20/25, confirmed pharmacy.    Pt verbalized understanding.

## 2025-06-06 ENCOUNTER — TELEPHONE (OUTPATIENT)
Dept: INTERNAL MEDICINE | Facility: CLINIC | Age: 50
End: 2025-06-06
Payer: OTHER GOVERNMENT

## 2025-06-06 ENCOUNTER — HOSPITAL ENCOUNTER (OUTPATIENT)
Dept: GENERAL RADIOLOGY | Facility: HOSPITAL | Age: 50
Discharge: HOME | End: 2025-06-06
Admitting: NURSE PRACTITIONER
Payer: OTHER GOVERNMENT

## 2025-06-06 ENCOUNTER — OFFICE VISIT (OUTPATIENT)
Dept: INTERNAL MEDICINE | Facility: CLINIC | Age: 50
End: 2025-06-06
Payer: OTHER GOVERNMENT

## 2025-06-06 VITALS
DIASTOLIC BLOOD PRESSURE: 72 MMHG | SYSTOLIC BLOOD PRESSURE: 108 MMHG | HEIGHT: 64 IN | BODY MASS INDEX: 22.02 KG/M2 | WEIGHT: 129 LBS | HEART RATE: 79 BPM | TEMPERATURE: 98 F | RESPIRATION RATE: 16 BRPM

## 2025-06-06 DIAGNOSIS — R07.81 RIB PAIN ON RIGHT SIDE: ICD-10-CM

## 2025-06-06 DIAGNOSIS — J02.9 SORE THROAT: ICD-10-CM

## 2025-06-06 DIAGNOSIS — K14.6 TONGUE PAIN: Primary | ICD-10-CM

## 2025-06-06 LAB
EXPIRATION DATE: NORMAL
INTERNAL CONTROL: NORMAL
Lab: NORMAL
S PYO AG THROAT QL: NEGATIVE

## 2025-06-06 PROCEDURE — 71101 X-RAY EXAM UNILAT RIBS/CHEST: CPT

## 2025-06-06 PROCEDURE — 87880 STREP A ASSAY W/OPTIC: CPT | Performed by: NURSE PRACTITIONER

## 2025-06-06 PROCEDURE — 99213 OFFICE O/P EST LOW 20 MIN: CPT | Performed by: NURSE PRACTITIONER

## 2025-06-06 NOTE — TELEPHONE ENCOUNTER
This patient called on-call service with concerns of oral sores and redness of the tongue    Add on to schedule with one of the extenders if possible

## 2025-06-06 NOTE — PROGRESS NOTES
Patient Name: Maryana Love  : 1975   MRN: [id number]     Chief Complaint:    Chief Complaint   Patient presents with    Fall     Fell last night. Concerned about possible broken rib    tongue problem     Redness of tongue       History of Present Illness: Maryana Love is a 49 y.o. female.  History of Present Illness  The patient presents for evaluation of a tongue lesion and rib injury.    Tongue Lesion  - She reports an unusual lesion on her tongue, which she describes as resembling a burn.  - Additionally, she notes redness at the back of her tongue and suspects it may be a yeast infection.  - She is experiencing nasal congestion but reports no fever, cough, sore throat, sinus pain or pressure, shortness of breath, or wheezing.    Rib Injury  - She suspects a rib fracture sustained from a fall yesterday when she tripped over her dog's bed.  - She landed on her face and is experiencing pain in the affected area.    Supplemental information: She also mentions having tremors, for which she is currently on medication. She has not been on any recent antibiotics and does not use any inhalers. She takes Zyrtec daily for allergies but does not use Flonase nasal spray.         Subjective     Review of System: Review of Systems     Medications:     Current Outpatient Medications:     buPROPion XL (WELLBUTRIN XL) 300 MG 24 hr tablet, , Disp: , Rfl:     cetirizine (zyrTEC) 10 MG tablet, Take 1 tablet by mouth Daily., Disp: , Rfl:     hydrOXYzine pamoate (VISTARIL) 25 MG capsule, , Disp: , Rfl:     lamoTRIgine (LaMICtal) 100 MG tablet, Take 2 tablets by mouth Daily., Disp: , Rfl:     minoxidil (LONITEN) 2.5 MG tablet, , Disp: , Rfl:     multivitamin with minerals (MULTIVITAMIN ADULT PO), Take 1 tablet by mouth Daily., Disp: , Rfl:     propranolol LA (Inderal LA) 60 MG 24 hr capsule, Take 1 capsule by mouth Daily., Disp: 30 capsule, Rfl: 11    Magic Mouthwash Oral Suspension (diphenhydrAMINE HCl -  "aluminum & magnesium hydroxide-simethicone - lidocaine - nystatin), Swish and spit 5 mL Every 6 (Six) Hours As Needed for Stomatitis., Disp: 200 mL, Rfl: 0    NP Thyroid 90 MG tablet, , Disp: , Rfl:     Allergies:   Allergies   Allergen Reactions    Abilify [Aripiprazole] Other (See Comments)     Tremors      Nitrofurantoin Hives    Topiramate Itching    Chlorhexidine Rash     Surgical wipes       Objective     Physical Exam:   Vital Signs:   Vitals:    06/06/25 1148   BP: 108/72   BP Location: Right arm   Patient Position: Sitting   Cuff Size: Adult   Pulse: 79   Resp: 16   Temp: 98 °F (36.7 °C)   TempSrc: Infrared   Weight: 58.5 kg (129 lb)   Height: 163 cm (64.17\")   PainSc: 3      Body mass index is 22.03 kg/m². BMI is within normal parameters. No other follow-up for BMI required.      Physical Exam  Constitutional:       Appearance: She is not ill-appearing.   HENT:      Right Ear: Tympanic membrane normal.      Left Ear: Tympanic membrane normal.      Nose: No congestion or rhinorrhea.      Right Sinus: No maxillary sinus tenderness or frontal sinus tenderness.      Left Sinus: No maxillary sinus tenderness or frontal sinus tenderness.      Mouth/Throat:      Pharynx: Posterior oropharyngeal erythema present.   Eyes:      General:         Right eye: No discharge.         Left eye: No discharge.      Pupils: Pupils are equal, round, and reactive to light.   Cardiovascular:      Rate and Rhythm: Normal rate and regular rhythm.      Heart sounds: No murmur heard.  Pulmonary:      Effort: No respiratory distress.      Breath sounds: No stridor. No wheezing, rhonchi or rales.   Abdominal:      General: Bowel sounds are normal.      Tenderness: There is no abdominal tenderness.   Skin:     General: Skin is warm and dry.     Physical Exam  Mouth/Throat: Tongue appears slightly inflamed with small red bumps,  Neck: Small swollen glands noted, tender on palpation.  Musculoskeletal: Tenderness noted in the right lower " rib area.       Results       Assessment / Plan      Assessment/Plan:   Diagnoses and all orders for this visit:    1. Tongue pain (Primary)  -     Magic Mouthwash Oral Suspension (diphenhydrAMINE HCl - aluminum & magnesium hydroxide-simethicone - lidocaine - nystatin); Swish and spit 5 mL Every 6 (Six) Hours As Needed for Stomatitis.  Dispense: 200 mL; Refill: 0    2. Rib pain on right side  -     XR Ribs Right With PA Chest    3. Sore throat  -     POC Rapid Strep A; Future  -     POC Rapid Strep A       Assessment & Plan  1. Tongue pain  - Tongue appears slightly inflamed with red bumps, suggesting a possible yeast infection.  - No fever, cough, sore throat, or sinus pain reported; mild congestion and swollen glands noted.  - Strep test will be conducted to rule out streptococcal infection (negative).  - Treatment with  Miracle mouth wash with nystatin mouth rinse will be initiated. Patient advised to swish the mouth rinse and spit    2. Rib injury:  - Patient reports pain and tenderness in the rib area after tripping over a dog bed.  - Physical examination reveals tenderness in the rib area.  - X-ray of the ribs will be ordered to assess for any fractures.  - Patient advised to perform deep breathing exercises several times an hour and to use a band for support if needed. Injury expected to heal on its own.       Explained and discussed patient's condition and plan of care including labs and radiology that may be ordered.  Discussed when to follow-up.  Discussed possible red flags and how to follow-up with those.  Viewed patient's medications and discussed common side effects and symptoms to report. Patient to continue current medications as advised.  Be compliant with medications. Patient to call clinic if they have any worsening, no improvement, does not tolerate medication, or any future concerns about treatment.  Questions and concerns addressed at this appointment.  Patient to report to ER for emergencies.   Patient verbalized an understanding and agreement with plan of care.      Follow Up:   Return if symptoms worsen or fail to improve.  Patient or patient representative verbalized consent for the use of Ambient Listening during the visit with  BEATRICE Oh for chart documentation. 6/8/2025  11:09 EDT    BEATRICE Oh  [facility]

## 2025-07-10 ENCOUNTER — OFFICE VISIT (OUTPATIENT)
Dept: NEUROLOGY | Facility: CLINIC | Age: 50
End: 2025-07-10
Payer: OTHER GOVERNMENT

## 2025-07-10 VITALS
HEIGHT: 64 IN | DIASTOLIC BLOOD PRESSURE: 70 MMHG | BODY MASS INDEX: 22.64 KG/M2 | OXYGEN SATURATION: 98 % | SYSTOLIC BLOOD PRESSURE: 106 MMHG | HEART RATE: 78 BPM | WEIGHT: 132.6 LBS

## 2025-07-10 DIAGNOSIS — G25.0 ESSENTIAL TREMOR: Primary | ICD-10-CM

## 2025-07-10 RX ORDER — PROPRANOLOL HYDROCHLORIDE 80 MG/1
80 CAPSULE, EXTENDED RELEASE ORAL DAILY
Qty: 90 CAPSULE | Refills: 3 | Status: SHIPPED | OUTPATIENT
Start: 2025-07-10 | End: 2026-07-10

## 2025-07-10 NOTE — PROGRESS NOTES
"Chief Complaint  Tremors    Subjective        Maryana Love presents to [facility] NEUROLOGY  History of Present Illness    49 y.o. female returns in follow up.  Last visit on 3/6/25 rx TPM.      TPM caused itching.      Tremors improved and can eat with a fork, drinking, typing.      Developed tremors in hands for approximately 20 years.  Multiple family members with tremor.  Tremor starting to interfering with writing and holding glasses.  Present with posture and movement.  Not present at rest.   Excitement and fatigue worsen sx, rest improves.     Reviewed medical records:     Taking LTG, Vraylar BPAD.      Tremors in hands worsening.  Started on primidone.   Objective   Vital Signs:  /70   Pulse 78   Ht 163 cm (64.17\")   Wt 60.1 kg (132 lb 9.6 oz)   SpO2 98%   BMI 22.64 kg/m²   Estimated body mass index is 22.64 kg/m² as calculated from the following:    Height as of this encounter: 163 cm (64.17\").    Weight as of this encounter: 60.1 kg (132 lb 9.6 oz).    BMI is within normal parameters. No other follow-up for BMI required.    Neurological Exam  Mental Status  Awake, alert and oriented to person, place and time. Oriented to person, place and time. Speech is normal. Language is fluent with no aphasia. Attention and concentration are normal. Fund of knowledge is appropriate for level of education.    Cranial Nerves  CN III, IV, VI: Extraocular movements intact bilaterally. Pupils equal round and reactive to light bilaterally.  CN V: Facial sensation is normal.  CN VII: Full and symmetric facial movement.  CN IX, X: Palate elevates symmetrically  CN XI: Shoulder shrug strength is normal.  CN XII: Tongue midline without atrophy or fasciculations.    Motor   Strength is 5/5 throughout all four extremities.    Sensory  Sensation is intact to light touch, pinprick, vibration and proprioception in all four extremities.    Reflexes  Deep tendon reflexes are 2+ and symmetric in all " four extremities.    Coordination  Right: Finger-to-nose abnormality:Left: Finger-to-nose abnormality:  Intention tremor mild   .    Gait  Normal casual, toe, heel and tandem gait.       Physical Exam  Eyes:      Extraocular Movements: Extraocular movements intact.      Pupils: Pupils are equal, round, and reactive to light.   Neurological:      Motor: Motor strength is normal.     Deep Tendon Reflexes: Reflexes are normal and symmetric.   Psychiatric:         Speech: Speech normal.        Result Review :  The following data was reviewed by: Arnold Torrez MD on 07/10/2025:  Common labs          4/21/2025    10:38   Common Labs   Glucose 84    BUN 15    Creatinine 0.83    Sodium 144    Potassium 4.1    Chloride 108    Calcium 9.1    Albumin 4.0    Total Bilirubin 0.3    Alkaline Phosphatase 77    AST (SGOT) 24    ALT (SGPT) 16    WBC 5.90    Hemoglobin 12.9    Hematocrit 38.8    Platelets 349                Assessment and Plan   Diagnoses and all orders for this visit:    1. Essential tremor (Primary)  Assessment & Plan:  Tremors improved but not at goal    Increase Inderal LA 80 mg daily       Other orders  -     propranolol LA (Inderal LA) 80 MG 24 hr capsule; Take 1 capsule by mouth Daily.  Dispense: 90 capsule; Refill: 3             Follow Up   No follow-ups on file.  Patient was given instructions and counseling regarding her condition or for health maintenance advice. Please see specific information pulled into the AVS if appropriate.

## (undated) DEVICE — UNDERGLV SURG BIOGEL INDICATOR LF PF 7.5

## (undated) DEVICE — TBG PENCL TELESCP MEGADYNE SMOKE EVAC 10FT

## (undated) DEVICE — PROXIMATE RH ROTATING HEAD SKIN STAPLERS (35 WIDE) CONTAINS 35 STAINLESS STEEL STAPLES: Brand: PROXIMATE

## (undated) DEVICE — PATIENT RETURN ELECTRODE, SINGLE-USE, CONTACT QUALITY MONITORING, ADULT, WITH 9FT CORD, FOR PATIENTS WEIGING OVER 33LBS. (15KG): Brand: MEGADYNE

## (undated) DEVICE — SUT ETHLN 3/0 PC5 18IN 1893G

## (undated) DEVICE — ELECTRD BLD EZ CLN MOD XLNG 2.75IN

## (undated) DEVICE — PENCL E/S ULTRAVAC TELESCP NOSE HOLSTR 10FT

## (undated) DEVICE — STERILE PVP: Brand: MEDLINE INDUSTRIES, INC.

## (undated) DEVICE — IRRIGATOR BULB ASEPTO 60CC STRL

## (undated) DEVICE — GLV SURG SENSICARE W/ALOE PF LF 7 STRL

## (undated) DEVICE — CONTAINER,SPECIMEN,OR STERILE,4OZ: Brand: MEDLINE

## (undated) DEVICE — TP PLSTC CLR TRNSPORE 1IN SURG

## (undated) DEVICE — 1010 S-DRAPE TOWEL DRAPE 10/BX: Brand: STERI-DRAPE™

## (undated) DEVICE — BANDAGE,GAUZE,BULKEE II,4.5"X4.1YD,STRL: Brand: MEDLINE

## (undated) DEVICE — SUT SILK 2/0 SH 30IN K833H

## (undated) DEVICE — TRAP FLD MINIVAC MEGADYNE 100ML

## (undated) DEVICE — BRA COMPR SURG STL958 LG

## (undated) DEVICE — SUT MNCRYL PLS ANTIB UD 3/0 PS2 27IN

## (undated) DEVICE — SYR LUERLOK 50ML

## (undated) DEVICE — PK CHST BRST 10

## (undated) DEVICE — SUT MNCRYL 5/0 P3 18IN UD MCP493G

## (undated) DEVICE — CLN MEGADYNE TP SS

## (undated) DEVICE — SUT MNCRYL PLS ANTIB UD 4/0 PS2 18IN

## (undated) DEVICE — SYS CLS SKIN PREMIERPRO EXOFINFUSION 22CM

## (undated) DEVICE — SUT SILK 3/0 TIES 18IN A184H

## (undated) DEVICE — PREVENA PEEL & PLACE SYSTEM KIT- 13 CM: Brand: PREVENA™ PEEL & PLACE™

## (undated) DEVICE — ANTIBACTERIAL UNDYED BRAIDED (POLYGLACTIN 910), SYNTHETIC ABSORBABLE SUTURE: Brand: COATED VICRYL

## (undated) DEVICE — ADHS SKIN PREMIERPRO EXOFIN TOPICAL HI/VISC .5ML

## (undated) DEVICE — BLANKT WARM UNDER/BDY FUL/ACC A/ 90X206CM

## (undated) DEVICE — BLANKT WARM LOWR/BDY 100X120CM

## (undated) DEVICE — DEV DEL BRST/IMP GEL KELLER2 FUNNEL EA/5

## (undated) DEVICE — SUT VIC 3/0 SH CR8 27IN VCP784D

## (undated) DEVICE — CVR HNDL LIGHT RIGID

## (undated) DEVICE — DRSNG SURESITE WNDW 4X4.5

## (undated) DEVICE — Device

## (undated) DEVICE — BRA COMPR SURG STL958 MD

## (undated) DEVICE — DECANTER BAG 9": Brand: MEDLINE INDUSTRIES, INC.

## (undated) DEVICE — JACKSON-PRATT 100CC BULB RESERVOIR: Brand: CARDINAL HEALTH

## (undated) DEVICE — DECANT BG O JET

## (undated) DEVICE — SUT SILK 2/0 PS 18IN 1588H